# Patient Record
Sex: FEMALE | Race: BLACK OR AFRICAN AMERICAN | NOT HISPANIC OR LATINO | URBAN - METROPOLITAN AREA
[De-identification: names, ages, dates, MRNs, and addresses within clinical notes are randomized per-mention and may not be internally consistent; named-entity substitution may affect disease eponyms.]

---

## 2017-04-11 ENCOUNTER — INPATIENT (INPATIENT)
Facility: HOSPITAL | Age: 82
LOS: 5 days | Discharge: NURSING, REHAB OR LONG-TERM CARE | End: 2017-04-17
Attending: INTERNAL MEDICINE | Admitting: INTERNAL MEDICINE
Payer: MEDICARE

## 2017-04-11 VITALS
HEART RATE: 92 BPM | SYSTOLIC BLOOD PRESSURE: 151 MMHG | DIASTOLIC BLOOD PRESSURE: 71 MMHG | OXYGEN SATURATION: 95 % | TEMPERATURE: 100 F | RESPIRATION RATE: 16 BRPM

## 2017-04-11 DIAGNOSIS — I50.9 HEART FAILURE, UNSPECIFIED: ICD-10-CM

## 2017-04-11 LAB
APPEARANCE UR: CLEAR — SIGNIFICANT CHANGE UP
APTT BLD: 33.5 SEC — SIGNIFICANT CHANGE UP (ref 27.5–37.4)
B PERT DNA SPEC QL NAA+PROBE: SIGNIFICANT CHANGE UP
BASE EXCESS BLDV CALC-SCNC: 1.3 MMOL/L — SIGNIFICANT CHANGE UP
BASOPHILS # BLD AUTO: 0.04 K/UL — SIGNIFICANT CHANGE UP (ref 0–0.2)
BASOPHILS NFR BLD AUTO: 0.4 % — SIGNIFICANT CHANGE UP (ref 0–2)
BILIRUB UR-MCNC: NEGATIVE — SIGNIFICANT CHANGE UP
BLOOD GAS VENOUS - CREATININE: 1.21 MG/DL — SIGNIFICANT CHANGE UP (ref 0.5–1.3)
BLOOD UR QL VISUAL: NEGATIVE — SIGNIFICANT CHANGE UP
C PNEUM DNA SPEC QL NAA+PROBE: NOT DETECTED — SIGNIFICANT CHANGE UP
CHLORIDE BLDV-SCNC: 106 MMOL/L — SIGNIFICANT CHANGE UP (ref 96–108)
CK MB BLD-MCNC: 2.4 NG/ML — SIGNIFICANT CHANGE UP (ref 1–4.7)
CK SERPL-CCNC: 96 U/L — SIGNIFICANT CHANGE UP (ref 25–170)
COLOR SPEC: SIGNIFICANT CHANGE UP
EOSINOPHIL # BLD AUTO: 0.01 K/UL — SIGNIFICANT CHANGE UP (ref 0–0.5)
EOSINOPHIL NFR BLD AUTO: 0.1 % — SIGNIFICANT CHANGE UP (ref 0–6)
FLUAV H1 2009 PAND RNA SPEC QL NAA+PROBE: NOT DETECTED — SIGNIFICANT CHANGE UP
FLUAV H1 RNA SPEC QL NAA+PROBE: NOT DETECTED — SIGNIFICANT CHANGE UP
FLUAV H3 RNA SPEC QL NAA+PROBE: NOT DETECTED — SIGNIFICANT CHANGE UP
FLUAV SUBTYP SPEC NAA+PROBE: SIGNIFICANT CHANGE UP
FLUBV RNA SPEC QL NAA+PROBE: NOT DETECTED — SIGNIFICANT CHANGE UP
GAS PNL BLDV: 139 MMOL/L — SIGNIFICANT CHANGE UP (ref 136–146)
GLUCOSE BLDV-MCNC: 127 — HIGH (ref 70–99)
GLUCOSE UR-MCNC: NEGATIVE — SIGNIFICANT CHANGE UP
HADV DNA SPEC QL NAA+PROBE: NOT DETECTED — SIGNIFICANT CHANGE UP
HCO3 BLDV-SCNC: 24 MMOL/L — SIGNIFICANT CHANGE UP (ref 20–27)
HCOV 229E RNA SPEC QL NAA+PROBE: NOT DETECTED — SIGNIFICANT CHANGE UP
HCOV HKU1 RNA SPEC QL NAA+PROBE: NOT DETECTED — SIGNIFICANT CHANGE UP
HCOV NL63 RNA SPEC QL NAA+PROBE: NOT DETECTED — SIGNIFICANT CHANGE UP
HCOV OC43 RNA SPEC QL NAA+PROBE: NOT DETECTED — SIGNIFICANT CHANGE UP
HCT VFR BLD CALC: 35.4 % — SIGNIFICANT CHANGE UP (ref 34.5–45)
HCT VFR BLDV CALC: 34.2 % — LOW (ref 34.5–45)
HGB BLD-MCNC: 11.1 G/DL — LOW (ref 11.5–15.5)
HGB BLDV-MCNC: 11.1 G/DL — LOW (ref 11.5–15.5)
HMPV RNA SPEC QL NAA+PROBE: NOT DETECTED — SIGNIFICANT CHANGE UP
HPIV1 RNA SPEC QL NAA+PROBE: NOT DETECTED — SIGNIFICANT CHANGE UP
HPIV2 RNA SPEC QL NAA+PROBE: NOT DETECTED — SIGNIFICANT CHANGE UP
HPIV3 RNA SPEC QL NAA+PROBE: NOT DETECTED — SIGNIFICANT CHANGE UP
HPIV4 RNA SPEC QL NAA+PROBE: NOT DETECTED — SIGNIFICANT CHANGE UP
HYALINE CASTS # UR AUTO: SIGNIFICANT CHANGE UP (ref 0–?)
IMM GRANULOCYTES NFR BLD AUTO: 0.2 % — SIGNIFICANT CHANGE UP (ref 0–1.5)
INR BLD: 1.83 — HIGH (ref 0.88–1.17)
KETONES UR-MCNC: NEGATIVE — SIGNIFICANT CHANGE UP
LACTATE BLDV-MCNC: 2.4 MMOL/L — HIGH (ref 0.5–2)
LEUKOCYTE ESTERASE UR-ACNC: HIGH
LYMPHOCYTES # BLD AUTO: 0.87 K/UL — LOW (ref 1–3.3)
LYMPHOCYTES # BLD AUTO: 9.8 % — LOW (ref 13–44)
M PNEUMO DNA SPEC QL NAA+PROBE: NOT DETECTED — SIGNIFICANT CHANGE UP
MCHC RBC-ENTMCNC: 30 PG — SIGNIFICANT CHANGE UP (ref 27–34)
MCHC RBC-ENTMCNC: 31.4 % — LOW (ref 32–36)
MCV RBC AUTO: 95.7 FL — SIGNIFICANT CHANGE UP (ref 80–100)
MONOCYTES # BLD AUTO: 0.79 K/UL — SIGNIFICANT CHANGE UP (ref 0–0.9)
MONOCYTES NFR BLD AUTO: 8.9 % — SIGNIFICANT CHANGE UP (ref 2–14)
MUCOUS THREADS # UR AUTO: SIGNIFICANT CHANGE UP
NEUTROPHILS # BLD AUTO: 7.17 K/UL — SIGNIFICANT CHANGE UP (ref 1.8–7.4)
NEUTROPHILS NFR BLD AUTO: 80.6 % — HIGH (ref 43–77)
NITRITE UR-MCNC: NEGATIVE — SIGNIFICANT CHANGE UP
PCO2 BLDV: 50 MMHG — SIGNIFICANT CHANGE UP (ref 41–51)
PH BLDV: 7.34 PH — SIGNIFICANT CHANGE UP (ref 7.32–7.43)
PH UR: 6 — SIGNIFICANT CHANGE UP (ref 4.6–8)
PLATELET # BLD AUTO: 191 K/UL — SIGNIFICANT CHANGE UP (ref 150–400)
PMV BLD: 11.1 FL — SIGNIFICANT CHANGE UP (ref 7–13)
PO2 BLDV: 39 MMHG — SIGNIFICANT CHANGE UP (ref 35–40)
POTASSIUM BLDV-SCNC: 3.9 MMOL/L — SIGNIFICANT CHANGE UP (ref 3.4–4.5)
PROT UR-MCNC: 20 — SIGNIFICANT CHANGE UP
PROTHROM AB SERPL-ACNC: 20.8 SEC — HIGH (ref 9.8–13.1)
RBC # BLD: 3.7 M/UL — LOW (ref 3.8–5.2)
RBC # FLD: 16.8 % — HIGH (ref 10.3–14.5)
RBC CASTS # UR COMP ASSIST: SIGNIFICANT CHANGE UP (ref 0–?)
RSV RNA SPEC QL NAA+PROBE: NOT DETECTED — SIGNIFICANT CHANGE UP
RV+EV RNA SPEC QL NAA+PROBE: NOT DETECTED — SIGNIFICANT CHANGE UP
SAO2 % BLDV: 64.9 % — SIGNIFICANT CHANGE UP (ref 60–85)
SP GR SPEC: 1.01 — SIGNIFICANT CHANGE UP (ref 1–1.03)
SQUAMOUS # UR AUTO: SIGNIFICANT CHANGE UP
TROPONIN T SERPL-MCNC: < 0.06 NG/ML — SIGNIFICANT CHANGE UP (ref 0–0.06)
UROBILINOGEN FLD QL: NORMAL E.U. — SIGNIFICANT CHANGE UP (ref 0.1–0.2)
WBC # BLD: 8.9 K/UL — SIGNIFICANT CHANGE UP (ref 3.8–10.5)
WBC # FLD AUTO: 8.9 K/UL — SIGNIFICANT CHANGE UP (ref 3.8–10.5)
WBC CLUMPS #/AREA URNS HPF: PRESENT — HIGH (ref 0–?)
WBC UR QL: HIGH (ref 0–?)

## 2017-04-11 PROCEDURE — 71010: CPT | Mod: 26

## 2017-04-11 RX ORDER — WARFARIN SODIUM 2.5 MG/1
2 TABLET ORAL DAILY
Qty: 0 | Refills: 0 | Status: COMPLETED | OUTPATIENT
Start: 2017-04-11 | End: 2017-04-12

## 2017-04-11 RX ORDER — FUROSEMIDE 40 MG
20 TABLET ORAL ONCE
Qty: 0 | Refills: 0 | Status: DISCONTINUED | OUTPATIENT
Start: 2017-04-11 | End: 2017-04-11

## 2017-04-11 RX ORDER — CEFTRIAXONE 500 MG/1
1 INJECTION, POWDER, FOR SOLUTION INTRAMUSCULAR; INTRAVENOUS ONCE
Qty: 0 | Refills: 0 | Status: COMPLETED | OUTPATIENT
Start: 2017-04-11 | End: 2017-04-11

## 2017-04-11 RX ORDER — METOPROLOL TARTRATE 50 MG
50 TABLET ORAL
Qty: 0 | Refills: 0 | Status: DISCONTINUED | OUTPATIENT
Start: 2017-04-11 | End: 2017-04-17

## 2017-04-11 RX ORDER — IPRATROPIUM/ALBUTEROL SULFATE 18-103MCG
3 AEROSOL WITH ADAPTER (GRAM) INHALATION EVERY 6 HOURS
Qty: 0 | Refills: 0 | Status: DISCONTINUED | OUTPATIENT
Start: 2017-04-11 | End: 2017-04-17

## 2017-04-11 RX ORDER — NITROGLYCERIN 6.5 MG
0.4 CAPSULE, EXTENDED RELEASE ORAL
Qty: 0 | Refills: 0 | Status: DISCONTINUED | OUTPATIENT
Start: 2017-04-11 | End: 2017-04-17

## 2017-04-11 RX ORDER — FUROSEMIDE 40 MG
20 TABLET ORAL
Qty: 0 | Refills: 0 | Status: DISCONTINUED | OUTPATIENT
Start: 2017-04-11 | End: 2017-04-11

## 2017-04-11 RX ORDER — FERROUS SULFATE 325(65) MG
325 TABLET ORAL DAILY
Qty: 0 | Refills: 0 | Status: DISCONTINUED | OUTPATIENT
Start: 2017-04-11 | End: 2017-04-17

## 2017-04-11 RX ORDER — IPRATROPIUM/ALBUTEROL SULFATE 18-103MCG
3 AEROSOL WITH ADAPTER (GRAM) INHALATION ONCE
Qty: 0 | Refills: 0 | Status: COMPLETED | OUTPATIENT
Start: 2017-04-11 | End: 2017-04-11

## 2017-04-11 RX ORDER — FUROSEMIDE 40 MG
40 TABLET ORAL ONCE
Qty: 0 | Refills: 0 | Status: COMPLETED | OUTPATIENT
Start: 2017-04-11 | End: 2017-04-11

## 2017-04-11 RX ORDER — DILTIAZEM HCL 120 MG
300 CAPSULE, EXT RELEASE 24 HR ORAL DAILY
Qty: 0 | Refills: 0 | Status: DISCONTINUED | OUTPATIENT
Start: 2017-04-11 | End: 2017-04-17

## 2017-04-11 RX ORDER — MULTIVIT-MIN/FERROUS GLUCONATE 9 MG/15 ML
1 LIQUID (ML) ORAL DAILY
Qty: 0 | Refills: 0 | Status: DISCONTINUED | OUTPATIENT
Start: 2017-04-11 | End: 2017-04-17

## 2017-04-11 RX ORDER — FUROSEMIDE 40 MG
40 TABLET ORAL
Qty: 0 | Refills: 0 | Status: DISCONTINUED | OUTPATIENT
Start: 2017-04-12 | End: 2017-04-13

## 2017-04-11 RX ORDER — FUROSEMIDE 40 MG
20 TABLET ORAL ONCE
Qty: 0 | Refills: 0 | Status: COMPLETED | OUTPATIENT
Start: 2017-04-11 | End: 2017-04-11

## 2017-04-11 RX ADMIN — Medication 60 MILLIGRAM(S): at 13:34

## 2017-04-11 RX ADMIN — Medication 300 MILLIGRAM(S): at 21:56

## 2017-04-11 RX ADMIN — CEFTRIAXONE 100 GRAM(S): 500 INJECTION, POWDER, FOR SOLUTION INTRAMUSCULAR; INTRAVENOUS at 13:34

## 2017-04-11 RX ADMIN — Medication 3 MILLILITER(S): at 13:00

## 2017-04-11 RX ADMIN — Medication 60 MILLIGRAM(S): at 23:21

## 2017-04-11 RX ADMIN — Medication 20 MILLIGRAM(S): at 17:58

## 2017-04-11 RX ADMIN — Medication 0.4 MILLIGRAM(S): at 13:34

## 2017-04-11 RX ADMIN — Medication 40 MILLIGRAM(S): at 13:34

## 2017-04-11 RX ADMIN — Medication 20 MILLIGRAM(S): at 22:45

## 2017-04-11 RX ADMIN — Medication 3 MILLILITER(S): at 23:52

## 2017-04-11 NOTE — H&P ADULT. - RADIOLOGY RESULTS AND INTERPRETATION
CXR: Blunted bilateral costophrenic angles (small pleural reactions), increased right retrocardiac opacity suggest underlying parenchymal consolidation or pleural fluid loculation. Clear remaining lungs. No PTX.  Prosthetic aortic and mitral valves, left chest wall dual-lead pacemaker, retained epicardial pacing wire leads, cardiomegaly, and aortic calcifications.

## 2017-04-11 NOTE — ED ADULT NURSE REASSESSMENT NOTE - NS ED NURSE REASSESS COMMENT FT1
report received from XUAN Lemon at shift change, pt in NAD, awaiting bed, will continue to monitor pt.

## 2017-04-11 NOTE — H&P ADULT. - PROBLEM SELECTOR PLAN 1
CHF exacerbation, increased leg edema to thighs (baseline edema up to mid-shins)  dietary undercompliance, current respiratory viral infection  check proBNP, fluid restrict  c/w bronchodilators, supplemental oxygen, BiLevel at night and prn Acute on chronic CHF exacerbation, increased leg edema to thighs (baseline edema up to mid-shins)  dietary undercompliance, current respiratory viral infection  check proBNP, fluid restrict, strict I/O (Hsu), telemetry, daily weights  c/w lasix iv 40 bid, bronchodilators, supplemental oxygen, BiLevel at night and prn  check TTE for EF, diastology, wall motion, valves  interrogate PPM

## 2017-04-11 NOTE — ED ADULT NURSE REASSESSMENT NOTE - NS ED NURSE REASSESS COMMENT FT1
Patient placed on bipap for slight tachypnea. Tolerating well. Call bell within reach and daughter at bedside. Will continue to monitor.

## 2017-04-11 NOTE — ED PROVIDER NOTE - OBJECTIVE STATEMENT
89 yo female with copd and chf presents with shortness of breath, progressively worsening x days.  Pt denies sob, chest pain, fevers chills,  she does co increased LE swelling bilaterally.  She has no recent travel, surgery, PE risk factors.  She was recently admitted for a CHF exacbation at another hospital

## 2017-04-11 NOTE — H&P ADULT. - ASSESSMENT
89 year old woman with CHF, CAD (CABG), arrythmia (PPM), PVD, MVR/AVR (bioprosthetic), HTN, HLD, COPD, h/o CVA, presents with increased LE edema and SOB, admitted for CHF exacerbation requiring BiLevel, found with positive human metapneumovirus.

## 2017-04-11 NOTE — ED ADULT NURSE NOTE - PSH
Aortic Valvar Stenosis  S/P AVR bovine  CABG (Coronary Artery Bypass Graft)    Cataract, age-related  b/l surgeries  Hernia, inguinal, left  s/p repair??  Mitral Valvular Disorder  S/P MVR bovine  Pacemaker    S/P   x 1

## 2017-04-11 NOTE — ED ADULT NURSE NOTE - OBJECTIVE STATEMENT
Patient received in TRA A&Ox3 and ambulatory small distances with a walker/cane at baseline. C/O worsening SOB , cough with yellow phlegm and worsening LE edema. Denies chest pain, palpitations. Symptoms unrelieved with nebulizer treatments at home. PE and history as noted below.

## 2017-04-11 NOTE — H&P ADULT. - RS GEN PE MLT RESP DETAILS PC
good air movement/breath sounds equal/rales/airway patent rales/good air movement/no wheezes/breath sounds equal/airway patent

## 2017-04-11 NOTE — H&P ADULT. - PROBLEM SELECTOR PLAN 6
DVT ppx: already on coumadin  Advanced care planning: DNR per patient/daughter years ago.  Had not discussed DNI before, will think about it, for now maintain DNR and not DNI

## 2017-04-11 NOTE — ED ADULT TRIAGE NOTE - CHIEF COMPLAINT QUOTE
c/o sob on exertion , fever and generalized weakness , productive cough with yellow phlegm,  Bilateral lower leg edema.PMH- copd, chf, pna, htn, choelsterol , afib

## 2017-04-11 NOTE — ED ADULT NURSE NOTE - PMH
Atrial Fibrillation  paroxysmal, on pradaxa  CAD (Coronary Artery Disease)    COPD (Chronic Obstructive Pulmonary Disease)  2nd hand smoking  CVA (Cerebral Infarction)  Rt posterior cerebral artery  Dyslipidemia    Heart Block AV Third Degree  S/P PPm  HTN (Hypertension)    PVD (Peripheral Vascular Disease)

## 2017-04-11 NOTE — ED ADULT NURSE REASSESSMENT NOTE - NS ED NURSE REASSESS COMMENT FT1
Report received from [facility] coverage RN. Patient discontinued off of bipap. Tolerating well.

## 2017-04-11 NOTE — ED ADULT NURSE REASSESSMENT NOTE - NS ED NURSE REASSESS COMMENT FT1
VS as noted, pt in NAD, 14Fr Hsu placed per order under sterile technique, initial 500cc output draining clear yellow urine, Lasix administered per order, pt awaiting bed assignment, will continue to monitor pt.

## 2017-04-12 DIAGNOSIS — J44.9 CHRONIC OBSTRUCTIVE PULMONARY DISEASE, UNSPECIFIED: ICD-10-CM

## 2017-04-12 DIAGNOSIS — I50.9 HEART FAILURE, UNSPECIFIED: ICD-10-CM

## 2017-04-12 DIAGNOSIS — J12.3 HUMAN METAPNEUMOVIRUS PNEUMONIA: ICD-10-CM

## 2017-04-12 LAB
ALBUMIN SERPL ELPH-MCNC: 3.7 G/DL — SIGNIFICANT CHANGE UP (ref 3.3–5)
ALP SERPL-CCNC: 96 U/L — SIGNIFICANT CHANGE UP (ref 40–120)
ALT FLD-CCNC: 76 U/L — HIGH (ref 4–33)
APTT BLD: 146 SEC — CRITICAL HIGH (ref 27.5–37.4)
AST SERPL-CCNC: 57 U/L — HIGH (ref 4–32)
BACTERIA UR CULT: SIGNIFICANT CHANGE UP
BILIRUB SERPL-MCNC: 0.5 MG/DL — SIGNIFICANT CHANGE UP (ref 0.2–1.2)
BUN SERPL-MCNC: 25 MG/DL — HIGH (ref 7–23)
CALCIUM SERPL-MCNC: 9.4 MG/DL — SIGNIFICANT CHANGE UP (ref 8.4–10.5)
CHLORIDE SERPL-SCNC: 100 MMOL/L — SIGNIFICANT CHANGE UP (ref 98–107)
CK MB BLD-MCNC: 3.11 NG/ML — SIGNIFICANT CHANGE UP (ref 1–4.7)
CK MB BLD-MCNC: SIGNIFICANT CHANGE UP (ref 0–2.5)
CK SERPL-CCNC: 73 U/L — SIGNIFICANT CHANGE UP (ref 25–170)
CO2 SERPL-SCNC: 26 MMOL/L — SIGNIFICANT CHANGE UP (ref 22–31)
CREAT SERPL-MCNC: 1.22 MG/DL — SIGNIFICANT CHANGE UP (ref 0.5–1.3)
GLUCOSE SERPL-MCNC: 147 MG/DL — HIGH (ref 70–99)
HCT VFR BLD CALC: 31.5 % — LOW (ref 34.5–45)
HCT VFR BLD CALC: 34.3 % — LOW (ref 34.5–45)
HGB BLD-MCNC: 10 G/DL — LOW (ref 11.5–15.5)
HGB BLD-MCNC: 10.8 G/DL — LOW (ref 11.5–15.5)
INR BLD: 1.7 — HIGH (ref 0.88–1.17)
LACTATE SERPL-SCNC: 2.1 MMOL/L — HIGH (ref 0.5–2)
MCHC RBC-ENTMCNC: 29.6 PG — SIGNIFICANT CHANGE UP (ref 27–34)
MCHC RBC-ENTMCNC: 29.9 PG — SIGNIFICANT CHANGE UP (ref 27–34)
MCHC RBC-ENTMCNC: 31.5 % — LOW (ref 32–36)
MCHC RBC-ENTMCNC: 31.7 % — LOW (ref 32–36)
MCV RBC AUTO: 93.2 FL — SIGNIFICANT CHANGE UP (ref 80–100)
MCV RBC AUTO: 95 FL — SIGNIFICANT CHANGE UP (ref 80–100)
NT-PROBNP SERPL-SCNC: SIGNIFICANT CHANGE UP PG/ML
PLATELET # BLD AUTO: 177 K/UL — SIGNIFICANT CHANGE UP (ref 150–400)
PLATELET # BLD AUTO: 196 K/UL — SIGNIFICANT CHANGE UP (ref 150–400)
PMV BLD: 10.8 FL — SIGNIFICANT CHANGE UP (ref 7–13)
PMV BLD: 11.7 FL — SIGNIFICANT CHANGE UP (ref 7–13)
POTASSIUM SERPL-MCNC: 4.2 MMOL/L — SIGNIFICANT CHANGE UP (ref 3.5–5.3)
POTASSIUM SERPL-SCNC: 4.2 MMOL/L — SIGNIFICANT CHANGE UP (ref 3.5–5.3)
PROT SERPL-MCNC: 6.9 G/DL — SIGNIFICANT CHANGE UP (ref 6–8.3)
PROTHROM AB SERPL-ACNC: 19.2 SEC — HIGH (ref 9.8–13.1)
RBC # BLD: 3.38 M/UL — LOW (ref 3.8–5.2)
RBC # BLD: 3.61 M/UL — LOW (ref 3.8–5.2)
RBC # FLD: 16.3 % — HIGH (ref 10.3–14.5)
RBC # FLD: 16.5 % — HIGH (ref 10.3–14.5)
SODIUM SERPL-SCNC: 145 MMOL/L — SIGNIFICANT CHANGE UP (ref 135–145)
SPECIMEN SOURCE: SIGNIFICANT CHANGE UP
SPECIMEN SOURCE: SIGNIFICANT CHANGE UP
TROPONIN T SERPL-MCNC: < 0.06 NG/ML — SIGNIFICANT CHANGE UP (ref 0–0.06)
WBC # BLD: 4.94 K/UL — SIGNIFICANT CHANGE UP (ref 3.8–10.5)
WBC # BLD: 6.49 K/UL — SIGNIFICANT CHANGE UP (ref 3.8–10.5)
WBC # FLD AUTO: 4.94 K/UL — SIGNIFICANT CHANGE UP (ref 3.8–10.5)
WBC # FLD AUTO: 6.49 K/UL — SIGNIFICANT CHANGE UP (ref 3.8–10.5)

## 2017-04-12 RX ORDER — IPRATROPIUM/ALBUTEROL SULFATE 18-103MCG
3 AEROSOL WITH ADAPTER (GRAM) INHALATION EVERY 6 HOURS
Qty: 0 | Refills: 0 | Status: DISCONTINUED | OUTPATIENT
Start: 2017-04-12 | End: 2017-04-12

## 2017-04-12 RX ORDER — HEPARIN SODIUM 5000 [USP'U]/ML
INJECTION INTRAVENOUS; SUBCUTANEOUS
Qty: 25000 | Refills: 0 | Status: DISCONTINUED | OUTPATIENT
Start: 2017-04-12 | End: 2017-04-13

## 2017-04-12 RX ORDER — HEPARIN SODIUM 5000 [USP'U]/ML
5500 INJECTION INTRAVENOUS; SUBCUTANEOUS EVERY 6 HOURS
Qty: 0 | Refills: 0 | Status: DISCONTINUED | OUTPATIENT
Start: 2017-04-12 | End: 2017-04-13

## 2017-04-12 RX ORDER — BUDESONIDE AND FORMOTEROL FUMARATE DIHYDRATE 160; 4.5 UG/1; UG/1
2 AEROSOL RESPIRATORY (INHALATION)
Qty: 0 | Refills: 0 | Status: DISCONTINUED | OUTPATIENT
Start: 2017-04-12 | End: 2017-04-17

## 2017-04-12 RX ORDER — HEPARIN SODIUM 5000 [USP'U]/ML
2500 INJECTION INTRAVENOUS; SUBCUTANEOUS EVERY 6 HOURS
Qty: 0 | Refills: 0 | Status: DISCONTINUED | OUTPATIENT
Start: 2017-04-12 | End: 2017-04-13

## 2017-04-12 RX ADMIN — Medication 3 MILLILITER(S): at 15:50

## 2017-04-12 RX ADMIN — HEPARIN SODIUM 1200 UNIT(S)/HR: 5000 INJECTION INTRAVENOUS; SUBCUTANEOUS at 14:19

## 2017-04-12 RX ADMIN — HEPARIN SODIUM 0 UNIT(S)/HR: 5000 INJECTION INTRAVENOUS; SUBCUTANEOUS at 21:15

## 2017-04-12 RX ADMIN — Medication 3 MILLILITER(S): at 03:01

## 2017-04-12 RX ADMIN — Medication 50 MILLIGRAM(S): at 05:18

## 2017-04-12 RX ADMIN — Medication 60 MILLIGRAM(S): at 14:18

## 2017-04-12 RX ADMIN — Medication 3 MILLILITER(S): at 21:59

## 2017-04-12 RX ADMIN — Medication 50 MILLIGRAM(S): at 00:05

## 2017-04-12 RX ADMIN — Medication 1 TABLET(S): at 11:30

## 2017-04-12 RX ADMIN — Medication 50 MILLIGRAM(S): at 17:39

## 2017-04-12 RX ADMIN — WARFARIN SODIUM 2 MILLIGRAM(S): 2.5 TABLET ORAL at 17:39

## 2017-04-12 RX ADMIN — BUDESONIDE AND FORMOTEROL FUMARATE DIHYDRATE 2 PUFF(S): 160; 4.5 AEROSOL RESPIRATORY (INHALATION) at 22:28

## 2017-04-12 RX ADMIN — Medication 40 MILLIGRAM(S): at 17:40

## 2017-04-12 RX ADMIN — Medication 40 MILLIGRAM(S): at 05:17

## 2017-04-12 RX ADMIN — Medication 20 MILLIGRAM(S): at 17:52

## 2017-04-12 RX ADMIN — HEPARIN SODIUM 1000 UNIT(S)/HR: 5000 INJECTION INTRAVENOUS; SUBCUTANEOUS at 22:15

## 2017-04-12 RX ADMIN — Medication 300 MILLIGRAM(S): at 06:20

## 2017-04-12 RX ADMIN — Medication 325 MILLIGRAM(S): at 11:30

## 2017-04-12 RX ADMIN — Medication 60 MILLIGRAM(S): at 05:18

## 2017-04-12 RX ADMIN — Medication 3 MILLILITER(S): at 10:18

## 2017-04-12 NOTE — PHYSICAL THERAPY INITIAL EVALUATION ADULT - GENERAL OBSERVATIONS, REHAB EVAL
Patient received semi supine in bed, (+) supplemental O2 , (+) edema on B/L LE's, mild SOB with activities which relieved at rest .

## 2017-04-12 NOTE — PHYSICAL THERAPY INITIAL EVALUATION ADULT - PERTINENT HX OF CURRENT PROBLEM, REHAB EVAL
This is a 89 y/o F presents with increased LE edema and SOB, admitted for CHF exacerbation, found with positive human metapneumovirus.

## 2017-04-12 NOTE — PROVIDER CONTACT NOTE (OTHER) - SITUATION
patient refusing venodynes states they make her hot; despite education of risk and benefits pt still refusing (3) no apparent problem

## 2017-04-12 NOTE — PROVIDER CONTACT NOTE (OTHER) - SITUATION
patient refusing bipap states they make her hot; despite education of risk and benefits pt still refusing

## 2017-04-13 ENCOUNTER — TRANSCRIPTION ENCOUNTER (OUTPATIENT)
Age: 82
End: 2017-04-13

## 2017-04-13 LAB
ALBUMIN SERPL ELPH-MCNC: 3.5 G/DL — SIGNIFICANT CHANGE UP (ref 3.3–5)
ALP SERPL-CCNC: 87 U/L — SIGNIFICANT CHANGE UP (ref 40–120)
ALT FLD-CCNC: 80 U/L — HIGH (ref 4–33)
APTT BLD: 175.1 SEC — CRITICAL HIGH (ref 27.5–37.4)
AST SERPL-CCNC: 66 U/L — HIGH (ref 4–32)
BILIRUB SERPL-MCNC: 0.4 MG/DL — SIGNIFICANT CHANGE UP (ref 0.2–1.2)
BUN SERPL-MCNC: 32 MG/DL — HIGH (ref 7–23)
CALCIUM SERPL-MCNC: 9.2 MG/DL — SIGNIFICANT CHANGE UP (ref 8.4–10.5)
CHLORIDE SERPL-SCNC: 98 MMOL/L — SIGNIFICANT CHANGE UP (ref 98–107)
CO2 SERPL-SCNC: 26 MMOL/L — SIGNIFICANT CHANGE UP (ref 22–31)
CREAT SERPL-MCNC: 1.22 MG/DL — SIGNIFICANT CHANGE UP (ref 0.5–1.3)
GLUCOSE SERPL-MCNC: 178 MG/DL — HIGH (ref 70–99)
HCT VFR BLD CALC: 34 % — LOW (ref 34.5–45)
HGB BLD-MCNC: 10.8 G/DL — LOW (ref 11.5–15.5)
INR BLD: 2.51 — HIGH (ref 0.88–1.17)
LACTATE SERPL-SCNC: 1.4 MMOL/L — SIGNIFICANT CHANGE UP (ref 0.5–2)
MAGNESIUM SERPL-MCNC: 1.9 MG/DL — SIGNIFICANT CHANGE UP (ref 1.6–2.6)
MCHC RBC-ENTMCNC: 30 PG — SIGNIFICANT CHANGE UP (ref 27–34)
MCHC RBC-ENTMCNC: 31.8 % — LOW (ref 32–36)
MCV RBC AUTO: 94.4 FL — SIGNIFICANT CHANGE UP (ref 80–100)
PHOSPHATE SERPL-MCNC: 4.3 MG/DL — SIGNIFICANT CHANGE UP (ref 2.5–4.5)
PLATELET # BLD AUTO: 190 K/UL — SIGNIFICANT CHANGE UP (ref 150–400)
PMV BLD: 11.6 FL — SIGNIFICANT CHANGE UP (ref 7–13)
POTASSIUM SERPL-MCNC: 3.6 MMOL/L — SIGNIFICANT CHANGE UP (ref 3.5–5.3)
POTASSIUM SERPL-SCNC: 3.6 MMOL/L — SIGNIFICANT CHANGE UP (ref 3.5–5.3)
PROT SERPL-MCNC: 6.7 G/DL — SIGNIFICANT CHANGE UP (ref 6–8.3)
PROTHROM AB SERPL-ACNC: 28.6 SEC — HIGH (ref 9.8–13.1)
RBC # BLD: 3.6 M/UL — LOW (ref 3.8–5.2)
RBC # FLD: 16.4 % — HIGH (ref 10.3–14.5)
SODIUM SERPL-SCNC: 141 MMOL/L — SIGNIFICANT CHANGE UP (ref 135–145)
WBC # BLD: 6.2 K/UL — SIGNIFICANT CHANGE UP (ref 3.8–10.5)
WBC # FLD AUTO: 6.2 K/UL — SIGNIFICANT CHANGE UP (ref 3.8–10.5)

## 2017-04-13 RX ORDER — WARFARIN SODIUM 2.5 MG/1
2 TABLET ORAL DAILY
Qty: 0 | Refills: 0 | Status: COMPLETED | OUTPATIENT
Start: 2017-04-13 | End: 2017-04-13

## 2017-04-13 RX ORDER — FUROSEMIDE 40 MG
40 TABLET ORAL
Qty: 0 | Refills: 0 | Status: DISCONTINUED | OUTPATIENT
Start: 2017-04-13 | End: 2017-04-15

## 2017-04-13 RX ADMIN — HEPARIN SODIUM 0 UNIT(S)/HR: 5000 INJECTION INTRAVENOUS; SUBCUTANEOUS at 07:14

## 2017-04-13 RX ADMIN — Medication 20 MILLIGRAM(S): at 00:25

## 2017-04-13 RX ADMIN — Medication 3 MILLILITER(S): at 21:16

## 2017-04-13 RX ADMIN — Medication 50 MILLIGRAM(S): at 05:02

## 2017-04-13 RX ADMIN — Medication 40 MILLIGRAM(S): at 17:28

## 2017-04-13 RX ADMIN — Medication 20 MILLIGRAM(S): at 17:27

## 2017-04-13 RX ADMIN — Medication 3 MILLILITER(S): at 15:35

## 2017-04-13 RX ADMIN — Medication 20 MILLIGRAM(S): at 23:06

## 2017-04-13 RX ADMIN — Medication 20 MILLIGRAM(S): at 05:04

## 2017-04-13 RX ADMIN — Medication 50 MILLIGRAM(S): at 17:28

## 2017-04-13 RX ADMIN — Medication 40 MILLIGRAM(S): at 05:02

## 2017-04-13 RX ADMIN — Medication 200 MILLIGRAM(S): at 17:29

## 2017-04-13 RX ADMIN — Medication 1 TABLET(S): at 11:03

## 2017-04-13 RX ADMIN — Medication 3 MILLILITER(S): at 03:25

## 2017-04-13 RX ADMIN — Medication 300 MILLIGRAM(S): at 05:02

## 2017-04-13 RX ADMIN — BUDESONIDE AND FORMOTEROL FUMARATE DIHYDRATE 2 PUFF(S): 160; 4.5 AEROSOL RESPIRATORY (INHALATION) at 11:46

## 2017-04-13 RX ADMIN — WARFARIN SODIUM 2 MILLIGRAM(S): 2.5 TABLET ORAL at 17:27

## 2017-04-13 RX ADMIN — Medication 20 MILLIGRAM(S): at 11:03

## 2017-04-13 RX ADMIN — Medication 3 MILLILITER(S): at 11:08

## 2017-04-13 RX ADMIN — BUDESONIDE AND FORMOTEROL FUMARATE DIHYDRATE 2 PUFF(S): 160; 4.5 AEROSOL RESPIRATORY (INHALATION) at 21:49

## 2017-04-13 RX ADMIN — Medication 325 MILLIGRAM(S): at 11:03

## 2017-04-13 NOTE — PROVIDER CONTACT NOTE (OTHER) - DATE AND TIME:
12-Apr-2017 21:09
11-Apr-2017 23:50
12-Apr-2017 23:47
13-Apr-2017 04:38

## 2017-04-13 NOTE — DISCHARGE NOTE ADULT - MEDICATION SUMMARY - MEDICATIONS TO CHANGE
I will SWITCH the dose or number of times a day I take the medications listed below when I get home from the hospital:    Diltiazem Hydrochloride  mg/24 hours oral capsule, extended release  -- 1 cap(s) by mouth once a day

## 2017-04-13 NOTE — DISCHARGE NOTE ADULT - OTHER SIGNIFICANT FINDINGS
Admit Diagnosis) Heart failure  (PMH) Atrial Fibrillation  (PMH) PVD (Peripheral Vascular Disease)  (PMH) CVA (Cerebral Infarction)  (PMH) COPD (Chronic Obstructive Pulmonary Disease)  (PMH) Heart Block AV Third Degree  (PMH) Dyslipidemia  (PMH) HTN (Hypertension)  (PMH) CAD (Coronary Artery Disease)  (Principal DC/DX) Acute CHF  (Problem/DX) COPD (Chronic Obstructive Pulmonary Disease)  (Problem/DX) Human metapneumovirus (hMPV) pneumonia  (Problem/DX) Acute CHF  (PSH) Hernia, inguinal, left  (PSH) S/P   (PSH) Cataract, age-related  (PSH) Pacemaker  (PSH) Mitral Valvular Disorder  (PSH) Aortic Valvar Stenosis  (PSH) CABG (Coronary Artery Bypass Graft) Admit Diagnosis) Heart failure  (PMH) Atrial Fibrillation  (PMH) PVD (Peripheral Vascular Disease)  (PMH) CVA (Cerebral Infarction)  (PMH) COPD (Chronic Obstructive Pulmonary Disease)  (PMH) Heart Block AV Third Degree  (PMH) Dyslipidemia  (PMH) HTN (Hypertension)  (PMH) CAD (Coronary Artery Disease)  (PSH) Hernia, inguinal, left  (PSH) S/P   (PSH) Cataract, age-related  (PSH) Pacemaker  (PSH) Mitral Valvular Disorder  (PSH) Aortic Valvar Stenosis  (PSH) CABG (Coronary Artery Bypass Graft)

## 2017-04-13 NOTE — DISCHARGE NOTE ADULT - ADDITIONAL INSTRUCTIONS
Please follow up with your primary care provider within 1-2 weeks call for an appointment Please follow-up with your primary care provider within 1 week call for an appointment.  Please have your INR checked on Wednesday for your Coumadin dosing.

## 2017-04-13 NOTE — DISCHARGE NOTE ADULT - MEDICATION SUMMARY - MEDICATIONS TO TAKE
I will START or STAY ON the medications listed below when I get home from the hospital:    diltiaZEM 300 mg/24 hours oral capsule, extended release  -- 1 cap(s) by mouth once a day  -- Indication: For A-fib     Diltiazem Hydrochloride  mg/24 hours oral capsule, extended release  -- 1 cap(s) by mouth once a day  -- Indication: For A-fib    warfarin 2 mg oral tablet  -- 1 tab(s) by mouth once a day  -- Indication: For A-fib     Zetia 10 mg oral tablet  -- 1 tab(s) by mouth once a day  -- Indication: For Hyperlipidemia     metoprolol tartrate 50 mg oral tablet  -- 1 tab(s) by mouth 2 times a day  -- Indication: For HTN    Ventolin HFA 90 mcg/inh inhalation aerosol  -- 2 puff(s) inhaled 4 times a day, As Needed  -- Indication: For Bronchodilator     albuterol 2.5 mg/3 mL (0.083%) inhalation solution  -- 3 milliliter(s) inhaled every 6 hours, As Needed  -- Indication: For Bronchodilator     ipratropium 500 mcg/2.5 mL inhalation solution  -- 2.5 milliliter(s) inhaled 2 times a day, As Needed  -- Indication: For Bronchodilator     Advair Diskus 100 mcg-50 mcg inhalation powder  -- 1 puff(s) inhaled 2 times a day  -- Indication: For Bronchodilator     furosemide 40 mg oral tablet  -- 1 tab(s) by mouth once a day  -- Indication: For CHF    guaiFENesin 100 mg/5 mL oral liquid  -- 10 milliliter(s) by mouth every 6 hours, As needed, Cough  -- Indication: For Expectorant    ferrous sulfate 325 mg oral delayed release tablet  -- 1 tab(s) by mouth once a day  -- Indication: For Iron    Coenzyme Q10 100 mg oral capsule  -- 1 cap(s) by mouth once a day  -- Indication: For Vitamin    Centrum Silver Women's  --  by mouth once a day  -- Indication: For Vitamin    Multiple Vitamins oral tablet  -- 1 tab(s) by mouth once a day  -- Indication: For Vitamin I will START or STAY ON the medications listed below when I get home from the hospital:    predniSONE 20 mg oral tablet  -- 2 tab(s) by mouth once a day stop after 3 days.   -- Indication: For COPD (Chronic Obstructive Pulmonary Disease)    Diltiazem Hydrochloride  mg/24 hours oral capsule, extended release  -- 1 cap(s) by mouth once a day  -- Indication: For A-fib    warfarin 2 mg oral tablet  -- 1 tab(s) by mouth once a day  -- Indication: For A-fib     Zetia 10 mg oral tablet  -- 1 tab(s) by mouth once a day  -- Indication: For Hyperlipidemia     metoprolol tartrate 50 mg oral tablet  -- 1 tab(s) by mouth 2 times a day  -- Indication: For HTN    Ventolin HFA 90 mcg/inh inhalation aerosol  -- 2 puff(s) inhaled 4 times a day, As Needed  -- Indication: For Bronchodilator     albuterol 2.5 mg/3 mL (0.083%) inhalation solution  -- 3 milliliter(s) inhaled every 6 hours, As Needed  -- Indication: For Bronchodilator     ipratropium 500 mcg/2.5 mL inhalation solution  -- 2.5 milliliter(s) inhaled 2 times a day, As Needed  -- Indication: For Bronchodilator     Advair Diskus 100 mcg-50 mcg inhalation powder  -- 1 puff(s) inhaled 2 times a day  -- Indication: For Bronchodilator     furosemide 40 mg oral tablet  -- 1 tab(s) by mouth once a day  -- Indication: For CHF    guaiFENesin 100 mg/5 mL oral liquid  -- 10 milliliter(s) by mouth every 6 hours, As needed, Cough  -- Indication: For Expectorant    ferrous sulfate 325 mg oral delayed release tablet  -- 1 tab(s) by mouth once a day  -- Indication: For Iron    Coenzyme Q10 100 mg oral capsule  -- 1 cap(s) by mouth once a day  -- Indication: For Vitamin    Centrum Silver Women's  --  by mouth once a day  -- Indication: For Vitamin    Multiple Vitamins oral tablet  -- 1 tab(s) by mouth once a day  -- Indication: For Vitamin I will START or STAY ON the medications listed below when I get home from the hospital:    predniSONE 20 mg oral tablet  -- 2 tab(s) by mouth once a day stop after 3 days.   -- Indication: For COPD (Chronic Obstructive Pulmonary Disease)    Diltiazem Hydrochloride  mg/24 hours oral capsule, extended release  -- 1 cap(s) by mouth once a day  -- Indication: For A-fib    warfarin 2 mg oral tablet  -- 1 tab(s) by mouth once a day  -- Indication: For A-fib     Zetia 10 mg oral tablet  -- 1 tab(s) by mouth once a day  -- Indication: For Hyperlipidemia     metoprolol tartrate 50 mg oral tablet  -- 1 tab(s) by mouth 2 times a day  -- Indication: For HTN    Ventolin HFA 90 mcg/inh inhalation aerosol  -- 2 puff(s) inhaled 4 times a day, As Needed  -- Indication: For Bronchodilator     ipratropium 500 mcg/2.5 mL inhalation solution  -- 2.5 milliliter(s) inhaled 2 times a day, As Needed  -- Indication: For Bronchodilator     Advair Diskus 100 mcg-50 mcg inhalation powder  -- 1 puff(s) inhaled 2 times a day  -- Indication: For Bronchodilator     furosemide 40 mg oral tablet  -- 1 tab(s) by mouth once a day  -- Indication: For CHF    guaiFENesin 100 mg/5 mL oral liquid  -- 10 milliliter(s) by mouth every 6 hours, As needed, Cough  -- Indication: For Expectorant    ferrous sulfate 325 mg oral delayed release tablet  -- 1 tab(s) by mouth once a day  -- Indication: For Iron    Coenzyme Q10 100 mg oral capsule  -- 1 cap(s) by mouth once a day  -- Indication: For Vitamin    Centrum Silver Women's  --  by mouth once a day  -- Indication: For Vitamin    Multiple Vitamins oral tablet  -- 1 tab(s) by mouth once a day  -- Indication: For Vitamin

## 2017-04-13 NOTE — DISCHARGE NOTE ADULT - CARE PLAN
Principal Discharge DX:	Acute CHF  Goal:	Symptom resolution, medication compliance, prevent recurrent CHF related hospitalization  Instructions for follow-up, activity and diet:	Follow up with your Cardiologist within 1 week, call for appointment  Secondary Diagnosis:	COPD (Chronic Obstructive Pulmonary Disease)  Goal:	Symptom resolution, medication compliance, prevent disease progression  Instructions for follow-up, activity and diet:	Follow up with your pulmonologist within 1 week, call for appointment  Secondary Diagnosis:	Atrial fibrillation  Goal:	rate control, prevent CVA / TIA, medication compliance, risk factor control  Instructions for follow-up, activity and diet:	Maintain therapeutic INR range between 2.0 - 3.0. Proceed to lab according to instructions for INR blood draw and follow up with your physician in next 2-3 days for results and Coumadin dose adjustment Principal Discharge DX:	Acute CHF  Goal:	Symptom resolution, medication compliance, prevent recurrent CHF related hospitalization.  Instructions for follow-up, activity and diet:	Continue present medications.   Follow-up with your Cardiologist within 1 week, call for appointment  Secondary Diagnosis:	COPD (Chronic Obstructive Pulmonary Disease)  Goal:	Symptom resolution, medication compliance, prevent disease progression.  Instructions for follow-up, activity and diet:	Follow-up with your pulmonologist within 1 week, call for appointment.  Secondary Diagnosis:	Atrial fibrillation  Goal:	rate control, prevent CVA / TIA, medication compliance, risk factor control.  Instructions for follow-up, activity and diet:	Continue Coumadin.   Maintain therapeutic INR range between 2.0 - 3.0. Proceed to lab according to instructions for INR blood draw and follow up with your physician in next 2-3 days for results and Coumadin dose adjustment

## 2017-04-13 NOTE — PROVIDER CONTACT NOTE (OTHER) - SITUATION
patient refusing venodynes states they make her hot; despite education of risk and benefits pt still refusing

## 2017-04-13 NOTE — DISCHARGE NOTE ADULT - PROVIDER TOKENS
FREE:[LAST:[Renner],FIRST:[Jason],PHONE:[phone]],FAX:[(   )    -]] FREE:[LAST:[Renner],FIRST:[Jason],PHONE:[phone]],FAX:[(   )    -]],TOKEN:'[id number]'

## 2017-04-13 NOTE — DISCHARGE NOTE ADULT - PLAN OF CARE
Symptom resolution, medication compliance, prevent recurrent CHF related hospitalization Follow up with your Cardiologist within 1 week, call for appointment Symptom resolution, medication compliance, prevent disease progression Follow up with your pulmonologist within 1 week, call for appointment rate control, prevent CVA / TIA, medication compliance, risk factor control Maintain therapeutic INR range between 2.0 - 3.0. Proceed to lab according to instructions for INR blood draw and follow up with your physician in next 2-3 days for results and Coumadin dose adjustment Symptom resolution, medication compliance, prevent recurrent CHF related hospitalization. Continue present medications.   Follow-up with your Cardiologist within 1 week, call for appointment Symptom resolution, medication compliance, prevent disease progression. Follow-up with your pulmonologist within 1 week, call for appointment. rate control, prevent CVA / TIA, medication compliance, risk factor control. Continue Coumadin.   Maintain therapeutic INR range between 2.0 - 3.0. Proceed to lab according to instructions for INR blood draw and follow up with your physician in next 2-3 days for results and Coumadin dose adjustment

## 2017-04-13 NOTE — DISCHARGE NOTE ADULT - CARE PROVIDER_API CALL
Jason Renner  Phone: [phone]  Fax: (   )    - Jason Renner  Phone: [phone]  Fax: (   )    -    Geronimo Hanley ([facility]), Cardiovascular Disease; Internal Medicine  [address] [place] [address]  Phone: [phone]  Fax: [phone]

## 2017-04-13 NOTE — PROVIDER CONTACT NOTE (OTHER) - REASON
3 beats SVT
Rapid HR
patient refusing bipap states they make her hot; despite education of risk and benefits pt still refusing
patient refusing bipap states they make her hot; despite education of risk and benefits pt still refusing
patient refusing venodynes states they make her hot; despite education of risk and benefits pt still refusing
patient refusing venodynes states they make her hot; despite education of risk and benefits pt still refusing
lab aptt 146

## 2017-04-13 NOTE — DISCHARGE NOTE ADULT - PATIENT PORTAL LINK FT
“You can access the [facility] Patient Portal, offered by [facility], by registering with the following website: [email or web address]”

## 2017-04-13 NOTE — PROVIDER CONTACT NOTE (OTHER) - NAME OF MD/NP/PA/DO NOTIFIED:
BRY THOMAS (- In [facility] on Page), [id number]
BRY THOMAS (- In [facility] on Page), [id number]
Leidy Cobb MD
NATALIE LOZANO MD, [id number] team 8
house staff team 8 NATALIE LOZANO MD, [id number]
house staff team 8 NATALIE LOZANO MD, [id number]
house staff team 8 NATALIE LOZANO MD, [id number]

## 2017-04-13 NOTE — DISCHARGE NOTE ADULT - HOSPITAL COURSE
89 year old woman with CHF and chronic leg edema (b/l to mid-shin), CAD (CABG), AFib (PPM), PVD, MVR/AVR (bioprosthetic), HTN, HLD, COPD, h/o CVA, recent hospitalizations (12/2016 [facility] for PNA and COPD, 2/2017 [facility] ?CHF exac?) and rehab stays, usually walks with cane but now walker, presents from home with shortness of breath.  Felt generally well until Saturday 4/8/17 developed increased bilateral leg edema to upper thighs.  Following day had dyspnea on exertion and shortness of breath, associated with subjective fever, generalized weakness, cough with yellow phlegm.  On lasix 40 daily, received extra lasix 20 by daughter.  Came to ER because still short of breath.  No chest pain, chills, recent travel, surgery, PE risk factors.  Daughter notes patient does not follow low sodium diet or restrict water intake.  Notes daughter has itchy throat and many contacts at [employer] on Sunday possible sick contacts.    Patient found with human metapneumovirus positive.  Respirations improved on duonebs, iv solumedrol, iv lasix, and BiLevel.  Patient admitted to telemetry, showed atrial fib.  UA unremarkable, Urine culture multi-organisms.  Blood culture negative.  Guaifenesin for cough.  Cardiology evaluated, on heparin infusion to warfarin bridge for INR 2-3 given paroxysmal atrial fibrillation.  Pulmonary evaluated, adjusted iv solumedrol for COPD (hMPV) and started symbicort.  Echocardiogram ordered. 89 year old woman with CHF and chronic leg edema (b/l to mid-shin), CAD (CABG), AFib (PPM), PVD, MVR/AVR (bioprosthetic), HTN, HLD, COPD, h/o CVA, recent hospitalizations (12/2016 [facility] for PNA and COPD, 2/2017 [facility] ?CHF exac?) and rehab stays, usually walks with cane but now walker, presents from home with shortness of breath.  Felt generally well until Saturday 4/8/17 developed increased bilateral leg edema to upper thighs.  Following day had dyspnea on exertion and shortness of breath, associated with subjective fever, generalized weakness, cough with yellow phlegm.  On lasix 40 daily, received extra lasix 20 by daughter.  Came to ER because still short of breath.  No chest pain, chills, recent travel, surgery, PE risk factors.  Daughter notes patient does not follow low sodium diet or restrict water intake.  Notes daughter has itchy throat and many contacts at [employer] on Sunday possible sick contacts.    Patient found with human metapneumovirus positive.  Respirations improved on duonebs, iv solumedrol, iv lasix, and BiLevel.  Patient admitted to telemetry, showed atrial fib.  UA unremarkable, Urine culture multi-organisms.  Blood culture negative.  Guaifenesin for cough.  Cardiology evaluated, on heparin infusion to warfarin bridge for INR 2-3 given paroxysmal atrial fibrillation.  Pulmonary evaluated, adjusted iv solumedrol for COPD (hMPV) and started symbicort.  Echocardiogram ordered.  4/12 Hsu removed, urinating. UCx (4/11) multiorganisms. BCx (4/11) neg. Pulmonary decreased solumedrol to iv 20q6 x 2days, started symbicort.  4/13 INR 2.5, d/c'd hep drip, dosed warfarin 2  4/13 Med: Acute on chronic CHF- improved on diuresis, can change to Lasix PO, check TTE, consider start ARB, COPD Exac- symbicort, duoneb, solumedrol, PAF- rate control on CCB, BB. DVT ppx- Hep bridge to Coumadin. INR goal 2-3. DNR/DNI. PT: Rehab.   4/13 Cardio: Acute on chronic likely diastolic CHF. F/U TTE to assess LV function, check PPM interrogation, check TSH given A-fib, cont Cardizem, Lopressor, Lasix PO, diuresing well, cont Coumadin.  4/13/17 > Pulm > COPD exacerbation to cont IV steroids for now    4/13/17 > PPM > Normal pacing and sensing, no events corresponding to this admission, No reprogramming done   4/14 ABG in AM on RA.  DC Bipap today and O2 in AM.  Switch to PO Prednisone in AM is stable.  Blood  cx NEG.  Urine Cx-multiple organisms-contaminated  4/14/17 > Echo LVEF 65% Bioprosthetic mitral valve replacement. Mild mitral  regurgitation.  Mean transmitral valve gradient equals 8 mm Hg, which is elevated even in the setting of a prosthetic valve.  2. Bioprosthetic aortic valve replacement. Peak transaortic valve gradient equals 34 mm Hg, mean transaortic valve gradient equals 16 mm Hg, which is probably normal in the  presence of a prosthetic valve.  3. Mild left atrial enlargement.  4. Normal left ventricular internal dimensions and wall thicknesses.  5. Normal left ventricular systolic function. No segmental wall motion abnormalities.  6. Normal right ventricular size and function.  A device wire is noted in the right heart.  7. Estimated right ventricular systolic pressure equals 64 mm Hg, assuming right atrial pressure equals 10 mm Hg, consistent with severe pulmonary hypertension. 89 year old woman with CHF and chronic leg edema (b/l to mid-shin), CAD (CABG), AFib (PPM), PVD, MVR/AVR (bioprosthetic), HTN, HLD, COPD, h/o CVA, recent hospitalizations (12/2016 [facility] for PNA and COPD, 2/2017 [facility] ?CHF exac?) and rehab stays, usually walks with cane but now walker, presents from home with shortness of breath.  Felt generally well until Saturday 4/8/17 developed increased bilateral leg edema to upper thighs.  Following day had dyspnea on exertion and shortness of breath, associated with subjective fever, generalized weakness, cough with yellow phlegm.  On lasix 40 daily, received extra lasix 20 by daughter.  Came to ER because still short of breath.  No chest pain, chills, recent travel, surgery, PE risk factors.  Daughter notes patient does not follow low sodium diet or restrict water intake.  Notes daughter has itchy throat and many contacts at [employer] on Sunday possible sick contacts.    Patient found with human metapneumovirus positive.  Respirations improved on duonebs, iv solumedrol, iv lasix, and BiLevel.  Patient admitted to telemetry, showed atrial fib.  UA unremarkable, Urine culture multi-organisms.  Blood culture negative.  Guaifenesin for cough.  Cardiology evaluated, on heparin infusion to warfarin bridge for INR 2-3 given paroxysmal atrial fibrillation.  Pulmonary evaluated, adjusted iv solumedrol for COPD (hMPV) and started symbicort.  Echocardiogram ordered.  4/12 Hsu removed, urinating. UCx (4/11) multiorganisms. BCx (4/11) neg. Pulmonary decreased solumedrol to iv 20q6 x 2days, started symbicort.  4/13 INR 2.5, d/c'd hep drip, dosed warfarin 2  4/13 Med: Acute on chronic CHF- improved on diuresis, can change to Lasix PO, check TTE, consider start ARB, COPD Exac- symbicort, duoneb, solumedrol, PAF- rate control on CCB, BB. DVT ppx- Hep bridge to Coumadin. INR goal 2-3. DNR/DNI. PT: Rehab.   4/13 Cardio: Acute on chronic likely diastolic CHF. F/U TTE to assess LV function, check PPM interrogation, check TSH given A-fib, cont Cardizem, Lopressor, Lasix PO, diuresing well, cont Coumadin.  4/13/17 > Pulm > COPD exacerbation to cont IV steroids for now    4/13/17 > PPM > Normal pacing and sensing, no events corresponding to this admission, No reprogramming done   4/14 ABG in AM on RA.  DC Bipap today and O2 in AM.  Switch to PO Prednisone in AM is stable.  Blood  cx NEG.  Urine Cx-multiple organisms-contaminated  4/14/17 > Echo LVEF 65% Bioprosthetic mitral valve replacement. Mild mitral  regurgitation.  Mean transmitral valve gradient equals 8 mm Hg, which is elevated even in the setting of a prosthetic valve.  2. Bioprosthetic aortic valve replacement. Peak transaortic valve gradient equals 34 mm Hg, mean transaortic valve gradient equals 16 mm Hg, which is probably normal in the  presence of a prosthetic valve.  3. Mild left atrial enlargement.  4. Normal left ventricular internal dimensions and wall thicknesses.  5. Normal left ventricular systolic function. No segmental wall motion abnormalities.  6. Normal right ventricular size and function.  A device wire is noted in the right heart.  7. Estimated right ventricular systolic pressure equals 64 mm Hg, assuming right atrial pressure equals 10 mm Hg, consistent with severe pulmonary hypertension. 89 year old woman with CHF and chronic leg edema (b/l to mid-shin), CAD (CABG), AFib (PPM), PVD, MVR/AVR (bioprosthetic), HTN, HLD, COPD, h/o CVA, recent hospitalizations (12/2016 [facility] for PNA and COPD, 2/2017 [facility] ?CHF exac?) and rehab stays, usually walks with cane but now walker, presents from home with shortness of breath.  Felt generally well until Saturday 4/8/17 developed increased bilateral leg edema to upper thighs.  Following day had dyspnea on exertion and shortness of breath, associated with subjective fever, generalized weakness, cough with yellow phlegm.  On lasix 40 daily, received extra lasix 20 by daughter.  Came to ER because still short of breath.  No chest pain, chills, recent travel, surgery, PE risk factors.  Daughter notes patient does not follow low sodium diet or restrict water intake.  Notes daughter has itchy throat and many contacts at [employer] on Sunday possible sick contacts.    Patient found with human metapneumovirus positive.  Respirations improved on duonebs, iv solumedrol, iv lasix, and BiLevel.  Patient admitted to telemetry, showed atrial fib.  UA unremarkable, Urine culture multi-organisms.  Blood culture negative.  Guaifenesin for cough.  Cardiology evaluated, on heparin infusion to warfarin bridge for INR 2-3 given paroxysmal atrial fibrillation.  Pulmonary evaluated, adjusted iv solumedrol for COPD (hMPV) and started symbicort.  Echocardiogram ordered.  4/12 Hsu removed, urinating. UCx (4/11) multiorganisms. BCx (4/11) neg. Pulmonary decreased solumedrol to iv 20q6 x 2days, started symbicort.  4/13 INR 2.5, d/c'd hep drip, dosed warfarin 2  4/13 Med: Acute on chronic CHF- improved on diuresis, can change to Lasix PO, check TTE, consider start ARB, COPD Exac- symbicort, duoneb, solumedrol, PAF- rate control on CCB, BB. DVT ppx- Hep bridge to Coumadin. INR goal 2-3. DNR/DNI. PT: Rehab.   4/13 Cardio: Acute on chronic likely diastolic CHF. F/U TTE to assess LV function, check PPM interrogation, check TSH given A-fib, cont Cardizem, Lopressor, Lasix PO, diuresing well, cont Coumadin.  4/13/17 > Pulm > COPD exacerbation to cont IV steroids for now    4/13/17 > PPM > Normal pacing and sensing, no events corresponding to this admission, No reprogramming done   4/14 ABG in AM on RA.  DC Bipap today and O2 in AM.  Switch to PO Prednisone in AM is stable.  Blood  cx NEG.  Urine Cx-multiple organisms-contaminated  4/14/17 > Echo LVEF 65% Bioprosthetic mitral valve replacement. Mild mitral  regurgitation.  Mean transmitral valve gradient equals 8 mm Hg, which is elevated even in the setting of a prosthetic valve.  2. Bioprosthetic aortic valve replacement. Peak transaortic valve gradient equals 34 mm Hg, mean transaortic valve gradient equals 16 mm Hg, which is probably normal in the  presence of a prosthetic valve.  3. Mild left atrial enlargement.  4. Normal left ventricular internal dimensions and wall thicknesses.  5. Normal left ventricular systolic function. No segmental wall motion abnormalities.  6. Normal right ventricular size and function.  A device wire is noted in the right heart.  7. Estimated right ventricular systolic pressure equals 64 mm Hg, assuming right atrial pressure equals 10 mm Hg, consistent with severe pulmonary hypertension.  4/15 ABG: metabolic alkalosis, hypoxemia-will need O2 and will need to go down on lasix dose.  Will switch to oral prednisone.  4/17 Pt is medically stable for discharge to Rehab today as per Dr. Nixon.

## 2017-04-14 LAB
BASOPHILS # BLD AUTO: 0.03 K/UL — SIGNIFICANT CHANGE UP (ref 0–0.2)
BASOPHILS NFR BLD AUTO: 0.5 % — SIGNIFICANT CHANGE UP (ref 0–2)
BUN SERPL-MCNC: 32 MG/DL — HIGH (ref 7–23)
CALCIUM SERPL-MCNC: 9.5 MG/DL — SIGNIFICANT CHANGE UP (ref 8.4–10.5)
CHLORIDE SERPL-SCNC: 98 MMOL/L — SIGNIFICANT CHANGE UP (ref 98–107)
CO2 SERPL-SCNC: 33 MMOL/L — HIGH (ref 22–31)
CREAT SERPL-MCNC: 1.13 MG/DL — SIGNIFICANT CHANGE UP (ref 0.5–1.3)
EOSINOPHIL # BLD AUTO: 0.01 K/UL — SIGNIFICANT CHANGE UP (ref 0–0.5)
EOSINOPHIL NFR BLD AUTO: 0.2 % — SIGNIFICANT CHANGE UP (ref 0–6)
GLUCOSE SERPL-MCNC: 157 MG/DL — HIGH (ref 70–99)
HCT VFR BLD CALC: 35.2 % — SIGNIFICANT CHANGE UP (ref 34.5–45)
HCT VFR BLD CALC: 35.2 % — SIGNIFICANT CHANGE UP (ref 34.5–45)
HGB BLD-MCNC: 11.3 G/DL — LOW (ref 11.5–15.5)
HGB BLD-MCNC: 11.3 G/DL — LOW (ref 11.5–15.5)
IMM GRANULOCYTES NFR BLD AUTO: 0.5 % — SIGNIFICANT CHANGE UP (ref 0–1.5)
INR BLD: 2.22 — HIGH (ref 0.88–1.17)
LYMPHOCYTES # BLD AUTO: 0.58 K/UL — LOW (ref 1–3.3)
LYMPHOCYTES # BLD AUTO: 10 % — LOW (ref 13–44)
MAGNESIUM SERPL-MCNC: 2.1 MG/DL — SIGNIFICANT CHANGE UP (ref 1.6–2.6)
MCHC RBC-ENTMCNC: 30.1 PG — SIGNIFICANT CHANGE UP (ref 27–34)
MCHC RBC-ENTMCNC: 30.1 PG — SIGNIFICANT CHANGE UP (ref 27–34)
MCHC RBC-ENTMCNC: 32.1 % — SIGNIFICANT CHANGE UP (ref 32–36)
MCHC RBC-ENTMCNC: 32.1 % — SIGNIFICANT CHANGE UP (ref 32–36)
MCV RBC AUTO: 93.9 FL — SIGNIFICANT CHANGE UP (ref 80–100)
MCV RBC AUTO: 93.9 FL — SIGNIFICANT CHANGE UP (ref 80–100)
MONOCYTES # BLD AUTO: 0.35 K/UL — SIGNIFICANT CHANGE UP (ref 0–0.9)
MONOCYTES NFR BLD AUTO: 6 % — SIGNIFICANT CHANGE UP (ref 2–14)
NEUTROPHILS # BLD AUTO: 4.82 K/UL — SIGNIFICANT CHANGE UP (ref 1.8–7.4)
NEUTROPHILS NFR BLD AUTO: 82.8 % — HIGH (ref 43–77)
PLATELET # BLD AUTO: 136 K/UL — LOW (ref 150–400)
PLATELET # BLD AUTO: 136 K/UL — LOW (ref 150–400)
PMV BLD: 11.6 FL — SIGNIFICANT CHANGE UP (ref 7–13)
PMV BLD: 11.6 FL — SIGNIFICANT CHANGE UP (ref 7–13)
POTASSIUM SERPL-MCNC: 4 MMOL/L — SIGNIFICANT CHANGE UP (ref 3.5–5.3)
POTASSIUM SERPL-SCNC: 4 MMOL/L — SIGNIFICANT CHANGE UP (ref 3.5–5.3)
PROTHROM AB SERPL-ACNC: 25.3 SEC — HIGH (ref 9.8–13.1)
RBC # BLD: 3.75 M/UL — LOW (ref 3.8–5.2)
RBC # BLD: 3.75 M/UL — LOW (ref 3.8–5.2)
RBC # FLD: 16.1 % — HIGH (ref 10.3–14.5)
RBC # FLD: 16.1 % — HIGH (ref 10.3–14.5)
SODIUM SERPL-SCNC: 145 MMOL/L — SIGNIFICANT CHANGE UP (ref 135–145)
WBC # BLD: 5.82 K/UL — SIGNIFICANT CHANGE UP (ref 3.8–10.5)
WBC # BLD: 5.82 K/UL — SIGNIFICANT CHANGE UP (ref 3.8–10.5)
WBC # FLD AUTO: 5.82 K/UL — SIGNIFICANT CHANGE UP (ref 3.8–10.5)
WBC # FLD AUTO: 5.82 K/UL — SIGNIFICANT CHANGE UP (ref 3.8–10.5)

## 2017-04-14 PROCEDURE — 93306 TTE W/DOPPLER COMPLETE: CPT | Mod: 26

## 2017-04-14 RX ORDER — WARFARIN SODIUM 2.5 MG/1
2 TABLET ORAL ONCE
Qty: 0 | Refills: 0 | Status: COMPLETED | OUTPATIENT
Start: 2017-04-14 | End: 2017-04-14

## 2017-04-14 RX ADMIN — BUDESONIDE AND FORMOTEROL FUMARATE DIHYDRATE 2 PUFF(S): 160; 4.5 AEROSOL RESPIRATORY (INHALATION) at 09:26

## 2017-04-14 RX ADMIN — Medication 1 TABLET(S): at 12:45

## 2017-04-14 RX ADMIN — Medication 300 MILLIGRAM(S): at 06:32

## 2017-04-14 RX ADMIN — Medication 3 MILLILITER(S): at 09:43

## 2017-04-14 RX ADMIN — Medication 40 MILLIGRAM(S): at 19:41

## 2017-04-14 RX ADMIN — Medication 3 MILLILITER(S): at 16:05

## 2017-04-14 RX ADMIN — Medication 20 MILLIGRAM(S): at 06:32

## 2017-04-14 RX ADMIN — Medication 3 MILLILITER(S): at 22:41

## 2017-04-14 RX ADMIN — Medication 20 MILLIGRAM(S): at 12:44

## 2017-04-14 RX ADMIN — Medication 3 MILLILITER(S): at 03:22

## 2017-04-14 RX ADMIN — WARFARIN SODIUM 2 MILLIGRAM(S): 2.5 TABLET ORAL at 19:39

## 2017-04-14 RX ADMIN — BUDESONIDE AND FORMOTEROL FUMARATE DIHYDRATE 2 PUFF(S): 160; 4.5 AEROSOL RESPIRATORY (INHALATION) at 23:21

## 2017-04-14 RX ADMIN — Medication 50 MILLIGRAM(S): at 06:33

## 2017-04-14 RX ADMIN — Medication 50 MILLIGRAM(S): at 19:41

## 2017-04-14 RX ADMIN — Medication 40 MILLIGRAM(S): at 06:32

## 2017-04-14 RX ADMIN — Medication 325 MILLIGRAM(S): at 12:44

## 2017-04-14 NOTE — DIETITIAN INITIAL EVALUATION ADULT. - OTHER INFO
Nutrition consult received for Registered Dietitian; admitted for shortness of breath. Met with patient, reports appetite/PO is good for meals >75%. Denies food allergies, GI distress (nausea/vomiting/constipation/diarrhea), or issues with chewing/swallowing. Reports UBW PTA is typically stable at 160# without any recent significant weight changes. Patient acknowledges current diet restrictions without any nutrition-related questions or concerns voiced at this time.

## 2017-04-14 NOTE — DIETITIAN INITIAL EVALUATION ADULT. - NS AS NUTRI INTERV MEALS SNACK
General/healthful diet/Carbohydrate - modified diet/1) Continue Consistent Carbohydrate (no snack), DASH/TLC (cholesterol and sodium restricted), Low Fat, 1500ml Fluid Restriction diet which remains appropriate    2) Suggest obtain HbA1C level     3) Suggest outpatient follow up with appropriate RD for purposes of long-term nutrition evaluation.

## 2017-04-14 NOTE — DIETITIAN INITIAL EVALUATION ADULT. - DIET TYPE
Low Fat, 1500ml Fluid Restriction/DASH/TLC (sodium and cholesterol restricted diet)/consistent carbohydrate (no snacks)

## 2017-04-14 NOTE — DIETITIAN INITIAL EVALUATION ADULT. - PROBLEM SELECTOR PLAN 1
Acute on chronic CHF exacerbation, increased leg edema to thighs (baseline edema up to mid-shins)  dietary undercompliance, current respiratory viral infection  check proBNP, fluid restrict, strict I/O (Hsu), telemetry, daily weights  c/w lasix iv 40 bid, bronchodilators, supplemental oxygen, BiLevel at night and prn  check TTE for EF, diastology, wall motion, valves  interrogate PPM

## 2017-04-15 LAB
BASE EXCESS BLDA CALC-SCNC: 14.5 MMOL/L — SIGNIFICANT CHANGE UP
BASE EXCESS BLDA CALC-SCNC: 15.2 MMOL/L — SIGNIFICANT CHANGE UP
BUN SERPL-MCNC: 38 MG/DL — HIGH (ref 7–23)
CALCIUM SERPL-MCNC: 9 MG/DL — SIGNIFICANT CHANGE UP (ref 8.4–10.5)
CHLORIDE SERPL-SCNC: 95 MMOL/L — LOW (ref 98–107)
CO2 SERPL-SCNC: 33 MMOL/L — HIGH (ref 22–31)
CREAT SERPL-MCNC: 1.11 MG/DL — SIGNIFICANT CHANGE UP (ref 0.5–1.3)
GLUCOSE SERPL-MCNC: 175 MG/DL — HIGH (ref 70–99)
HCO3 BLDA-SCNC: 37 MMOL/L — HIGH (ref 22–26)
HCO3 BLDA-SCNC: 38 MMOL/L — HIGH (ref 22–26)
HCT VFR BLD CALC: 34.3 % — LOW (ref 34.5–45)
HGB BLD-MCNC: 10.6 G/DL — LOW (ref 11.5–15.5)
INR BLD: 2.49 — HIGH (ref 0.88–1.17)
MCHC RBC-ENTMCNC: 29.4 PG — SIGNIFICANT CHANGE UP (ref 27–34)
MCHC RBC-ENTMCNC: 30.9 % — LOW (ref 32–36)
MCV RBC AUTO: 95 FL — SIGNIFICANT CHANGE UP (ref 80–100)
PCO2 BLDA: 50 MMHG — HIGH (ref 32–48)
PCO2 BLDA: 51 MMHG — HIGH (ref 32–48)
PH BLDA: 7.5 PH — HIGH (ref 7.35–7.45)
PH BLDA: 7.51 PH — HIGH (ref 7.35–7.45)
PLATELET # BLD AUTO: 184 K/UL — SIGNIFICANT CHANGE UP (ref 150–400)
PMV BLD: 11.5 FL — SIGNIFICANT CHANGE UP (ref 7–13)
PO2 BLDA: 37 MMHG — CRITICAL LOW (ref 83–108)
PO2 BLDA: 52 MMHG — LOW (ref 83–108)
POTASSIUM SERPL-MCNC: 3.5 MMOL/L — SIGNIFICANT CHANGE UP (ref 3.5–5.3)
POTASSIUM SERPL-SCNC: 3.5 MMOL/L — SIGNIFICANT CHANGE UP (ref 3.5–5.3)
PROTHROM AB SERPL-ACNC: 28.4 SEC — HIGH (ref 9.8–13.1)
RBC # BLD: 3.61 M/UL — LOW (ref 3.8–5.2)
RBC # FLD: 16 % — HIGH (ref 10.3–14.5)
SAO2 % BLDA: 65.8 % — LOW (ref 95–99)
SAO2 % BLDA: 85.7 % — LOW (ref 95–99)
SODIUM SERPL-SCNC: 142 MMOL/L — SIGNIFICANT CHANGE UP (ref 135–145)
WBC # BLD: 7.59 K/UL — SIGNIFICANT CHANGE UP (ref 3.8–10.5)
WBC # FLD AUTO: 7.59 K/UL — SIGNIFICANT CHANGE UP (ref 3.8–10.5)

## 2017-04-15 RX ORDER — WARFARIN SODIUM 2.5 MG/1
2 TABLET ORAL ONCE
Qty: 0 | Refills: 0 | Status: COMPLETED | OUTPATIENT
Start: 2017-04-15 | End: 2017-04-15

## 2017-04-15 RX ORDER — FUROSEMIDE 40 MG
40 TABLET ORAL DAILY
Qty: 0 | Refills: 0 | Status: DISCONTINUED | OUTPATIENT
Start: 2017-04-15 | End: 2017-04-17

## 2017-04-15 RX ADMIN — Medication 325 MILLIGRAM(S): at 12:05

## 2017-04-15 RX ADMIN — Medication 50 MILLIGRAM(S): at 06:32

## 2017-04-15 RX ADMIN — Medication 1 TABLET(S): at 12:09

## 2017-04-15 RX ADMIN — Medication 300 MILLIGRAM(S): at 06:32

## 2017-04-15 RX ADMIN — Medication 40 MILLIGRAM(S): at 14:11

## 2017-04-15 RX ADMIN — Medication 3 MILLILITER(S): at 10:00

## 2017-04-15 RX ADMIN — Medication 3 MILLILITER(S): at 03:58

## 2017-04-15 RX ADMIN — Medication 50 MILLIGRAM(S): at 18:36

## 2017-04-15 RX ADMIN — Medication 200 MILLIGRAM(S): at 18:40

## 2017-04-15 RX ADMIN — BUDESONIDE AND FORMOTEROL FUMARATE DIHYDRATE 2 PUFF(S): 160; 4.5 AEROSOL RESPIRATORY (INHALATION) at 21:13

## 2017-04-15 RX ADMIN — Medication 3 MILLILITER(S): at 21:30

## 2017-04-15 RX ADMIN — WARFARIN SODIUM 2 MILLIGRAM(S): 2.5 TABLET ORAL at 18:35

## 2017-04-15 RX ADMIN — BUDESONIDE AND FORMOTEROL FUMARATE DIHYDRATE 2 PUFF(S): 160; 4.5 AEROSOL RESPIRATORY (INHALATION) at 09:20

## 2017-04-15 RX ADMIN — Medication 40 MILLIGRAM(S): at 06:32

## 2017-04-15 NOTE — PROVIDER CONTACT NOTE (CRITICAL VALUE NOTIFICATION) - ASSESSMENT
patient shows no signs of bleeding or distress
vital signs stable   no acute distress noted   all fall safety precautions maintained
no respitatory ditress noted

## 2017-04-15 NOTE — PROVIDER CONTACT NOTE (CRITICAL VALUE NOTIFICATION) - BACKGROUND
Admit Diagnosis) Heart failure  (PMH) Atrial Fibrillation  (PMH) PVD (Peripheral Vascular Disease)  (PMH) CVA (Cerebral Infarction)  (PMH) COPD (Chronic Obstructive Pulmonary Disease)  (PMH) Heart Block AV Third Degree
pt was off 02

## 2017-04-16 LAB
BACTERIA BLD CULT: SIGNIFICANT CHANGE UP
BUN SERPL-MCNC: 45 MG/DL — HIGH (ref 7–23)
CALCIUM SERPL-MCNC: 9.4 MG/DL — SIGNIFICANT CHANGE UP (ref 8.4–10.5)
CHLORIDE SERPL-SCNC: 95 MMOL/L — LOW (ref 98–107)
CO2 SERPL-SCNC: 34 MMOL/L — HIGH (ref 22–31)
CREAT SERPL-MCNC: 1.1 MG/DL — SIGNIFICANT CHANGE UP (ref 0.5–1.3)
GLUCOSE SERPL-MCNC: 168 MG/DL — HIGH (ref 70–99)
HCT VFR BLD CALC: 35.6 % — SIGNIFICANT CHANGE UP (ref 34.5–45)
HGB BLD-MCNC: 11.3 G/DL — LOW (ref 11.5–15.5)
INR BLD: 2.39 — HIGH (ref 0.88–1.17)
MCHC RBC-ENTMCNC: 29.5 PG — SIGNIFICANT CHANGE UP (ref 27–34)
MCHC RBC-ENTMCNC: 31.7 % — LOW (ref 32–36)
MCV RBC AUTO: 93 FL — SIGNIFICANT CHANGE UP (ref 80–100)
PLATELET # BLD AUTO: 168 K/UL — SIGNIFICANT CHANGE UP (ref 150–400)
PMV BLD: 10.8 FL — SIGNIFICANT CHANGE UP (ref 7–13)
POTASSIUM SERPL-MCNC: 3.6 MMOL/L — SIGNIFICANT CHANGE UP (ref 3.5–5.3)
POTASSIUM SERPL-SCNC: 3.6 MMOL/L — SIGNIFICANT CHANGE UP (ref 3.5–5.3)
PROTHROM AB SERPL-ACNC: 27.2 SEC — HIGH (ref 9.8–13.1)
RBC # BLD: 3.83 M/UL — SIGNIFICANT CHANGE UP (ref 3.8–5.2)
RBC # FLD: 15.8 % — HIGH (ref 10.3–14.5)
SODIUM SERPL-SCNC: 143 MMOL/L — SIGNIFICANT CHANGE UP (ref 135–145)
WBC # BLD: 10.27 K/UL — SIGNIFICANT CHANGE UP (ref 3.8–10.5)
WBC # FLD AUTO: 10.27 K/UL — SIGNIFICANT CHANGE UP (ref 3.8–10.5)

## 2017-04-16 RX ORDER — WARFARIN SODIUM 2.5 MG/1
2 TABLET ORAL ONCE
Qty: 0 | Refills: 0 | Status: COMPLETED | OUTPATIENT
Start: 2017-04-16 | End: 2017-04-16

## 2017-04-16 RX ORDER — POTASSIUM CHLORIDE 20 MEQ
40 PACKET (EA) ORAL ONCE
Qty: 0 | Refills: 0 | Status: COMPLETED | OUTPATIENT
Start: 2017-04-16 | End: 2017-04-16

## 2017-04-16 RX ADMIN — Medication 40 MILLIGRAM(S): at 05:33

## 2017-04-16 RX ADMIN — WARFARIN SODIUM 2 MILLIGRAM(S): 2.5 TABLET ORAL at 17:30

## 2017-04-16 RX ADMIN — BUDESONIDE AND FORMOTEROL FUMARATE DIHYDRATE 2 PUFF(S): 160; 4.5 AEROSOL RESPIRATORY (INHALATION) at 21:50

## 2017-04-16 RX ADMIN — Medication 300 MILLIGRAM(S): at 05:33

## 2017-04-16 RX ADMIN — Medication 3 MILLILITER(S): at 10:00

## 2017-04-16 RX ADMIN — Medication 50 MILLIGRAM(S): at 05:33

## 2017-04-16 RX ADMIN — Medication 40 MILLIEQUIVALENT(S): at 22:59

## 2017-04-16 RX ADMIN — Medication 3 MILLILITER(S): at 16:02

## 2017-04-16 RX ADMIN — Medication 1 TABLET(S): at 11:14

## 2017-04-16 RX ADMIN — Medication 50 MILLIGRAM(S): at 17:30

## 2017-04-16 RX ADMIN — Medication 200 MILLIGRAM(S): at 21:50

## 2017-04-16 RX ADMIN — Medication 325 MILLIGRAM(S): at 11:14

## 2017-04-16 RX ADMIN — BUDESONIDE AND FORMOTEROL FUMARATE DIHYDRATE 2 PUFF(S): 160; 4.5 AEROSOL RESPIRATORY (INHALATION) at 11:13

## 2017-04-16 RX ADMIN — Medication 40 MILLIGRAM(S): at 05:34

## 2017-04-16 RX ADMIN — Medication 3 MILLILITER(S): at 22:15

## 2017-04-17 VITALS — OXYGEN SATURATION: 98 %

## 2017-04-17 LAB
BUN SERPL-MCNC: 38 MG/DL — HIGH (ref 7–23)
CALCIUM SERPL-MCNC: 9 MG/DL — SIGNIFICANT CHANGE UP (ref 8.4–10.5)
CHLORIDE SERPL-SCNC: 98 MMOL/L — SIGNIFICANT CHANGE UP (ref 98–107)
CO2 SERPL-SCNC: 36 MMOL/L — HIGH (ref 22–31)
CREAT SERPL-MCNC: 0.88 MG/DL — SIGNIFICANT CHANGE UP (ref 0.5–1.3)
GLUCOSE SERPL-MCNC: 127 MG/DL — HIGH (ref 70–99)
HCT VFR BLD CALC: 36.2 % — SIGNIFICANT CHANGE UP (ref 34.5–45)
HGB BLD-MCNC: 11.2 G/DL — LOW (ref 11.5–15.5)
INR BLD: 2.17 — HIGH (ref 0.88–1.17)
MAGNESIUM SERPL-MCNC: 2.1 MG/DL — SIGNIFICANT CHANGE UP (ref 1.6–2.6)
MCHC RBC-ENTMCNC: 29.7 PG — SIGNIFICANT CHANGE UP (ref 27–34)
MCHC RBC-ENTMCNC: 30.9 % — LOW (ref 32–36)
MCV RBC AUTO: 96 FL — SIGNIFICANT CHANGE UP (ref 80–100)
PLATELET # BLD AUTO: 170 K/UL — SIGNIFICANT CHANGE UP (ref 150–400)
PMV BLD: 11.5 FL — SIGNIFICANT CHANGE UP (ref 7–13)
POTASSIUM SERPL-MCNC: 3.8 MMOL/L — SIGNIFICANT CHANGE UP (ref 3.5–5.3)
POTASSIUM SERPL-SCNC: 3.8 MMOL/L — SIGNIFICANT CHANGE UP (ref 3.5–5.3)
PROTHROM AB SERPL-ACNC: 24.7 SEC — HIGH (ref 9.8–13.1)
RBC # BLD: 3.77 M/UL — LOW (ref 3.8–5.2)
RBC # FLD: 16 % — HIGH (ref 10.3–14.5)
SODIUM SERPL-SCNC: 145 MMOL/L — SIGNIFICANT CHANGE UP (ref 135–145)
WBC # BLD: 13.42 K/UL — HIGH (ref 3.8–10.5)
WBC # FLD AUTO: 13.42 K/UL — HIGH (ref 3.8–10.5)

## 2017-04-17 RX ORDER — WARFARIN SODIUM 2.5 MG/1
2 TABLET ORAL ONCE
Qty: 0 | Refills: 0 | Status: COMPLETED | OUTPATIENT
Start: 2017-04-17 | End: 2017-04-17

## 2017-04-17 RX ADMIN — WARFARIN SODIUM 2 MILLIGRAM(S): 2.5 TABLET ORAL at 17:12

## 2017-04-17 RX ADMIN — Medication 3 MILLILITER(S): at 17:17

## 2017-04-17 RX ADMIN — Medication 325 MILLIGRAM(S): at 11:36

## 2017-04-17 RX ADMIN — Medication 50 MILLIGRAM(S): at 05:42

## 2017-04-17 RX ADMIN — Medication 50 MILLIGRAM(S): at 17:11

## 2017-04-17 RX ADMIN — Medication 1 TABLET(S): at 12:17

## 2017-04-17 RX ADMIN — Medication 300 MILLIGRAM(S): at 05:42

## 2017-04-17 RX ADMIN — BUDESONIDE AND FORMOTEROL FUMARATE DIHYDRATE 2 PUFF(S): 160; 4.5 AEROSOL RESPIRATORY (INHALATION) at 09:46

## 2017-04-17 RX ADMIN — Medication 40 MILLIGRAM(S): at 05:42

## 2017-04-17 RX ADMIN — Medication 3 MILLILITER(S): at 04:16

## 2017-04-17 RX ADMIN — Medication 3 MILLILITER(S): at 09:10

## 2017-05-07 ENCOUNTER — INPATIENT (INPATIENT)
Facility: HOSPITAL | Age: 82
LOS: 4 days | Discharge: HOME | End: 2017-05-12
Attending: INTERNAL MEDICINE | Admitting: INTERNAL MEDICINE
Payer: MEDICARE

## 2017-05-07 VITALS
HEART RATE: 65 BPM | SYSTOLIC BLOOD PRESSURE: 113 MMHG | TEMPERATURE: 98 F | RESPIRATION RATE: 18 BRPM | OXYGEN SATURATION: 94 % | DIASTOLIC BLOOD PRESSURE: 52 MMHG

## 2017-05-07 DIAGNOSIS — Z29.9 ENCOUNTER FOR PROPHYLACTIC MEASURES, UNSPECIFIED: ICD-10-CM

## 2017-05-07 DIAGNOSIS — E78.5 HYPERLIPIDEMIA, UNSPECIFIED: ICD-10-CM

## 2017-05-07 DIAGNOSIS — N17.9 ACUTE KIDNEY FAILURE, UNSPECIFIED: ICD-10-CM

## 2017-05-07 DIAGNOSIS — I10 ESSENTIAL (PRIMARY) HYPERTENSION: ICD-10-CM

## 2017-05-07 DIAGNOSIS — I48.91 UNSPECIFIED ATRIAL FIBRILLATION: ICD-10-CM
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
## 2017-05-07 DIAGNOSIS — J44.9 CHRONIC OBSTRUCTIVE PULMONARY DISEASE, UNSPECIFIED: ICD-10-CM

## 2017-05-07 DIAGNOSIS — I50.9 HEART FAILURE, UNSPECIFIED: ICD-10-CM

## 2017-05-07 DIAGNOSIS — R53.1 WEAKNESS: ICD-10-CM

## 2017-05-07 LAB
ALBUMIN SERPL ELPH-MCNC: 3.1 G/DL — LOW (ref 3.3–5)
ALP SERPL-CCNC: 167 U/L — HIGH (ref 40–120)
ALT FLD-CCNC: 88 U/L — HIGH (ref 4–33)
APPEARANCE UR: CLEAR — SIGNIFICANT CHANGE UP
AST SERPL-CCNC: 103 U/L — HIGH (ref 4–32)
BASOPHILS # BLD AUTO: 0.06 K/UL — SIGNIFICANT CHANGE UP (ref 0–0.2)
BASOPHILS NFR BLD AUTO: 0.6 % — SIGNIFICANT CHANGE UP (ref 0–2)
BASOPHILS NFR SPEC: 2 % — SIGNIFICANT CHANGE UP (ref 0–2)
BILIRUB SERPL-MCNC: 0.5 MG/DL — SIGNIFICANT CHANGE UP (ref 0.2–1.2)
BILIRUB UR-MCNC: NEGATIVE — SIGNIFICANT CHANGE UP
BLOOD UR QL VISUAL: NEGATIVE — SIGNIFICANT CHANGE UP
BUN SERPL-MCNC: 38 MG/DL — HIGH (ref 7–23)
CALCIUM SERPL-MCNC: 10.1 MG/DL — SIGNIFICANT CHANGE UP (ref 8.4–10.5)
CHLORIDE SERPL-SCNC: 100 MMOL/L — SIGNIFICANT CHANGE UP (ref 98–107)
CK MB BLD-MCNC: 1.94 NG/ML — SIGNIFICANT CHANGE UP (ref 1–4.7)
CK MB BLD-MCNC: SIGNIFICANT CHANGE UP (ref 0–2.5)
CK SERPL-CCNC: 40 U/L — SIGNIFICANT CHANGE UP (ref 25–170)
CO2 SERPL-SCNC: 20 MMOL/L — LOW (ref 22–31)
COLOR SPEC: YELLOW — SIGNIFICANT CHANGE UP
CREAT SERPL-MCNC: 1.79 MG/DL — HIGH (ref 0.5–1.3)
EOSINOPHIL # BLD AUTO: 0.35 K/UL — SIGNIFICANT CHANGE UP (ref 0–0.5)
EOSINOPHIL NFR BLD AUTO: 3.7 % — SIGNIFICANT CHANGE UP (ref 0–6)
EOSINOPHIL NFR FLD: 2 % — SIGNIFICANT CHANGE UP (ref 0–6)
GLUCOSE SERPL-MCNC: 149 MG/DL — HIGH (ref 70–99)
GLUCOSE UR-MCNC: NEGATIVE — SIGNIFICANT CHANGE UP
HCT VFR BLD CALC: 34.6 % — SIGNIFICANT CHANGE UP (ref 34.5–45)
HGB BLD-MCNC: 11 G/DL — LOW (ref 11.5–15.5)
HYALINE CASTS # UR AUTO: SIGNIFICANT CHANGE UP (ref 0–?)
IMM GRANULOCYTES NFR BLD AUTO: 0.7 % — SIGNIFICANT CHANGE UP (ref 0–1.5)
INR BLD: 3.28 — HIGH (ref 0.88–1.17)
KETONES UR-MCNC: NEGATIVE — SIGNIFICANT CHANGE UP
LEUKOCYTE ESTERASE UR-ACNC: NEGATIVE — SIGNIFICANT CHANGE UP
LG PLATELETS BLD QL AUTO: SLIGHT — SIGNIFICANT CHANGE UP
LYMPHOCYTES # BLD AUTO: 1.18 K/UL — SIGNIFICANT CHANGE UP (ref 1–3.3)
LYMPHOCYTES # BLD AUTO: 12.6 % — LOW (ref 13–44)
LYMPHOCYTES NFR SPEC AUTO: 14 % — SIGNIFICANT CHANGE UP (ref 13–44)
MACROCYTES BLD QL: SIGNIFICANT CHANGE UP
MANUAL SMEAR VERIFICATION: SIGNIFICANT CHANGE UP
MCHC RBC-ENTMCNC: 29.2 PG — SIGNIFICANT CHANGE UP (ref 27–34)
MCHC RBC-ENTMCNC: 31.8 % — LOW (ref 32–36)
MCV RBC AUTO: 91.8 FL — SIGNIFICANT CHANGE UP (ref 80–100)
MICROCYTES BLD QL: SLIGHT — SIGNIFICANT CHANGE UP
MONOCYTES # BLD AUTO: 1.13 K/UL — HIGH (ref 0–0.9)
MONOCYTES NFR BLD AUTO: 12 % — SIGNIFICANT CHANGE UP (ref 2–14)
MONOCYTES NFR BLD: 3 % — SIGNIFICANT CHANGE UP (ref 2–9)
MUCOUS THREADS # UR AUTO: SIGNIFICANT CHANGE UP
MYELOCYTES NFR BLD: 1 % — HIGH (ref 0–0)
NEUTROPHIL AB SER-ACNC: 77 % — SIGNIFICANT CHANGE UP (ref 43–77)
NEUTROPHILS # BLD AUTO: 6.6 K/UL — SIGNIFICANT CHANGE UP (ref 1.8–7.4)
NEUTROPHILS NFR BLD AUTO: 70.4 % — SIGNIFICANT CHANGE UP (ref 43–77)
NITRITE UR-MCNC: NEGATIVE — SIGNIFICANT CHANGE UP
PH UR: 6 — SIGNIFICANT CHANGE UP (ref 4.6–8)
PLATELET # BLD AUTO: 241 K/UL — SIGNIFICANT CHANGE UP (ref 150–400)
PLATELET COUNT - ESTIMATE: NORMAL — SIGNIFICANT CHANGE UP
PMV BLD: 10.5 FL — SIGNIFICANT CHANGE UP (ref 7–13)
POLYCHROMASIA BLD QL SMEAR: SLIGHT — SIGNIFICANT CHANGE UP
POTASSIUM SERPL-MCNC: 5.5 MMOL/L — HIGH (ref 3.5–5.3)
POTASSIUM SERPL-SCNC: 5.5 MMOL/L — HIGH (ref 3.5–5.3)
PROT SERPL-MCNC: 6.8 G/DL — SIGNIFICANT CHANGE UP (ref 6–8.3)
PROT UR-MCNC: 30 — HIGH
PROTHROM AB SERPL-ACNC: 37.6 SEC — HIGH (ref 9.8–13.1)
RBC # BLD: 3.77 M/UL — LOW (ref 3.8–5.2)
RBC # FLD: 16.6 % — HIGH (ref 10.3–14.5)
RBC CASTS # UR COMP ASSIST: SIGNIFICANT CHANGE UP (ref 0–?)
SODIUM SERPL-SCNC: 138 MMOL/L — SIGNIFICANT CHANGE UP (ref 135–145)
SP GR SPEC: 1.01 — SIGNIFICANT CHANGE UP (ref 1–1.03)
SQUAMOUS # UR AUTO: SIGNIFICANT CHANGE UP
TARGETS BLD QL SMEAR: SLIGHT — SIGNIFICANT CHANGE UP
TROPONIN T SERPL-MCNC: < 0.06 NG/ML — SIGNIFICANT CHANGE UP (ref 0–0.06)
UROBILINOGEN FLD QL: NORMAL E.U. — SIGNIFICANT CHANGE UP (ref 0.1–0.2)
VARIANT LYMPHS # BLD: 1 % — SIGNIFICANT CHANGE UP
WBC # BLD: 9.39 K/UL — SIGNIFICANT CHANGE UP (ref 3.8–10.5)
WBC # FLD AUTO: 9.39 K/UL — SIGNIFICANT CHANGE UP (ref 3.8–10.5)
WBC UR QL: SIGNIFICANT CHANGE UP (ref 0–?)

## 2017-05-07 PROCEDURE — 71010: CPT | Mod: 26

## 2017-05-07 PROCEDURE — 70450 CT HEAD/BRAIN W/O DYE: CPT | Mod: 26

## 2017-05-07 PROCEDURE — 72170 X-RAY EXAM OF PELVIS: CPT | Mod: 26

## 2017-05-07 PROCEDURE — 72125 CT NECK SPINE W/O DYE: CPT | Mod: 26

## 2017-05-07 RX ORDER — BUDESONIDE AND FORMOTEROL FUMARATE DIHYDRATE 160; 4.5 UG/1; UG/1
2 AEROSOL RESPIRATORY (INHALATION)
Qty: 0 | Refills: 0 | Status: DISCONTINUED | OUTPATIENT
Start: 2017-05-07 | End: 2017-05-12

## 2017-05-07 RX ORDER — FUROSEMIDE 40 MG
40 TABLET ORAL DAILY
Qty: 0 | Refills: 0 | Status: DISCONTINUED | OUTPATIENT
Start: 2017-05-07 | End: 2017-05-09

## 2017-05-07 RX ORDER — ALBUTEROL 90 UG/1
2 AEROSOL, METERED ORAL EVERY 6 HOURS
Qty: 0 | Refills: 0 | Status: DISCONTINUED | OUTPATIENT
Start: 2017-05-07 | End: 2017-05-12

## 2017-05-07 RX ORDER — SODIUM CHLORIDE 9 MG/ML
500 INJECTION INTRAMUSCULAR; INTRAVENOUS; SUBCUTANEOUS ONCE
Qty: 0 | Refills: 0 | Status: COMPLETED | OUTPATIENT
Start: 2017-05-07 | End: 2017-05-07

## 2017-05-07 RX ORDER — FERROUS SULFATE 325(65) MG
325 TABLET ORAL DAILY
Qty: 0 | Refills: 0 | Status: DISCONTINUED | OUTPATIENT
Start: 2017-05-07 | End: 2017-05-12

## 2017-05-07 RX ORDER — TETANUS TOXOID, REDUCED DIPHTHERIA TOXOID AND ACELLULAR PERTUSSIS VACCINE, ADSORBED 5; 2.5; 8; 8; 2.5 [IU]/.5ML; [IU]/.5ML; UG/.5ML; UG/.5ML; UG/.5ML
0.5 SUSPENSION INTRAMUSCULAR ONCE
Qty: 0 | Refills: 0 | Status: COMPLETED | OUTPATIENT
Start: 2017-05-07 | End: 2017-05-07

## 2017-05-07 RX ORDER — METOPROLOL TARTRATE 50 MG
50 TABLET ORAL
Qty: 0 | Refills: 0 | Status: DISCONTINUED | OUTPATIENT
Start: 2017-05-07 | End: 2017-05-12

## 2017-05-07 RX ORDER — IPRATROPIUM/ALBUTEROL SULFATE 18-103MCG
3 AEROSOL WITH ADAPTER (GRAM) INHALATION EVERY 6 HOURS
Qty: 0 | Refills: 0 | Status: DISCONTINUED | OUTPATIENT
Start: 2017-05-07 | End: 2017-05-12

## 2017-05-07 RX ORDER — DILTIAZEM HCL 120 MG
300 CAPSULE, EXT RELEASE 24 HR ORAL DAILY
Qty: 0 | Refills: 0 | Status: DISCONTINUED | OUTPATIENT
Start: 2017-05-07 | End: 2017-05-12

## 2017-05-07 RX ADMIN — TETANUS TOXOID, REDUCED DIPHTHERIA TOXOID AND ACELLULAR PERTUSSIS VACCINE, ADSORBED 0.5 MILLILITER(S): 5; 2.5; 8; 8; 2.5 SUSPENSION INTRAMUSCULAR at 16:34

## 2017-05-07 RX ADMIN — BUDESONIDE AND FORMOTEROL FUMARATE DIHYDRATE 2 PUFF(S): 160; 4.5 AEROSOL RESPIRATORY (INHALATION) at 23:24

## 2017-05-07 RX ADMIN — SODIUM CHLORIDE 500 MILLILITER(S): 9 INJECTION INTRAMUSCULAR; INTRAVENOUS; SUBCUTANEOUS at 18:58

## 2017-05-07 NOTE — ED PROVIDER NOTE - MEDICAL DECISION MAKING DETAILS
Pt with recent rehab discharge, progressive weakness with trip and fall today likely 2/2 increased weakness, possible deconditioning. No CP concerning for ACS, no abd pain/dysuria suggestive of UTI. Will check labs and UA, CT head/c-spine r/o ICH/fx as pt on Coumadin, likely admission for deconditioning and inability to walk

## 2017-05-07 NOTE — ED ADULT NURSE REASSESSMENT NOTE - NS ED NURSE REASSESS COMMENT FT1
Pt. is admitted to tele bed 402A. Report given to XUAN Knight. 500 mL bolus of 0.9% Normal saline infusing. Pt. does not appear to be in distress at this time. Respirations are even and unlabored. Pt. denies pain. Daughter at bedside.

## 2017-05-07 NOTE — ED ADULT NURSE NOTE - OBJECTIVE STATEMENT
Patient received in room 2. Pt. A&O x3 and ambulatory with a cane at baseline. Pt. had a witnessed fall coming home from [employer] this afternoon. Daughter states the pt. tripped and hit the back of her head on the concrete step and has not walked since the fall. Daughter also states prior to this fall the pt. was hospitalized with pneumonia and has been going to rehab. Denies loss of consciousness, chest pain, SOB, dizziness. Pt. placed on cardiac monitor. VS as noted. Daughter at bedside. Patient received in room 2. Pt. A&O x3 and ambulatory with a cane at baseline. Pt. had a witnessed fall coming home from [employer] this afternoon. Daughter states the pt. tripped and hit the back of her head on the concrete step and has not walked since the fall. Daughter also states prior to this fall the pt. was hospitalized with pneumonia and has been going to rehab. Denies loss of consciousness, chest pain, SOB, dizziness, n/v, burning on urination, changes in urination pattern. Respirations are even and unlabored. Small abrasion to back of head. Pt. placed on cardiac monitor. VS as noted. Daughter at bedside. Patient received in room 2. Pt. A&O x3 and ambulatory with a cane at baseline. Pt. had a witnessed fall coming home from [employer] this afternoon. Daughter states the pt. tripped and hit the back of her head on the concrete step and has not walked since the fall. Daughter also states prior to this fall the pt. was hospitalized with pneumonia and has been going to rehab. Denies loss of consciousness, chest pain, SOB, dizziness, n/v, burning on urination, changes in urination pattern. Respirations are even and unlabored. Lungs are clear to auscultation. Abdomen is soft, nontender, and bowel sounds are present x4 quadrants. Skin is dry and intact. Small laceration to back of right side of head that is not currently bleeding. Pt. denies pain at this time. Pt. placed on cardiac monitor with paced rhythm. 22 gauge IV placed in right wrist. Labs drawn and sent. VS as noted. Daughter at bedside. Patient received in room 2. Pt. A&O x3 and ambulatory with a cane at baseline. Pt. had a witnessed fall coming home from [employer] this afternoon. Daughter states the pt. tripped and hit the back of her head on the concrete step and has not walked since the fall. Daughter also states prior to this fall the pt. was hospitalized with pneumonia and has been going to rehab. Denies loss of consciousness, chest pain, SOB, dizziness, n/v, burning on urination, changes in urination pattern. Respirations are even and unlabored. Lungs are clear to auscultation. Abdomen is soft, nontender, and bowel sounds are present x4 quadrants. Skin is dry and intact with noted 2+ pitting edema b/l lower extremities. Small laceration to back of right side of head that is not currently bleeding. Pt. denies pain at this time. Pt. placed on cardiac monitor with paced rhythm. 22 gauge IV placed in right wrist. Labs drawn and sent. VS as noted. Daughter at bedside.

## 2017-05-07 NOTE — ED ADULT NURSE NOTE - CHIEF COMPLAINT QUOTE
Pt fell come home from [employer] this afternoon, as per daughter pt tripped and hit the back of her head on the concrete.  Pt does not recall falling, currently taking Coumadin

## 2017-05-07 NOTE — ED PROVIDER NOTE - OBJECTIVE STATEMENT
89F with HTN, AVR, MVR, on Coumadin p/w fall and head injury. Pt just discharged from rehab 2 days ago, has been progressively weaker at home since. Today was walking up stairs and tripped over new door threshold, fell back and hit back of head. Has not tried ambulation since. Denies LOC, vision change, fever, dysuria, abd pain, vomiting, CP, neck pain. Reports mild pain back of head with some bleeding. Last tetanus unknown.

## 2017-05-07 NOTE — H&P ADULT. - HISTORY OF PRESENT ILLNESS
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 Pt states she was walking up the stairs and she lost her balance and fell back and hit the back of her head. Pt states she did not lose consciousness. As per daughter, patient was discharged on friday from rehab and has been feeling progressively weaker. Pt denies fever, chills, N/V/D/C, numbness, tingling, chest pain, shortness of breath, palpitations, dysuria, urinary/bowel incontinence or any other complaints.

## 2017-05-07 NOTE — H&P ADULT. - ASSESSMENT
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

## 2017-05-07 NOTE — H&P ADULT. - NEGATIVE MUSCULOSKELETAL SYMPTOMS
no back pain/no arthralgia/no joint swelling/no arm pain L/no arm pain R/no leg pain R/no muscle weakness/no stiffness/no muscle cramps/no neck pain/no myalgia/no leg pain L/no arthritis

## 2017-05-07 NOTE — H&P ADULT. - GASTROINTESTINAL DETAILS
normal/bowel sounds normal/no rebound tenderness/no distention/soft/nontender/no masses palpable/no rigidity/no guarding

## 2017-05-07 NOTE — H&P ADULT. - PMH
Atrial Fibrillation  paroxysmal, on pradaxa  CAD (Coronary Artery Disease)    CHF (congestive heart failure)    COPD (Chronic Obstructive Pulmonary Disease)  2nd hand smoking  CVA (Cerebral Infarction)  Rt posterior cerebral artery  Dyslipidemia    Heart Block AV Third Degree  S/P PPm  HTN (Hypertension)    PVD (Peripheral Vascular Disease)

## 2017-05-07 NOTE — H&P ADULT. - RS GEN PE MLT RESP DETAILS PC
breath sounds equal/no rales/normal/airway patent/no wheezes/good air movement/clear to auscultation bilaterally/respirations non-labored/no intercostal retractions/no rhonchi/no chest wall tenderness

## 2017-05-07 NOTE — ED ADULT TRIAGE NOTE - CHIEF COMPLAINT QUOTE
Pt fell come home from [employer] this afternoon, as per daughter pt tripped and hit the back of her head on the concrete.  Pt does not recall falling, currently taking Coumadin

## 2017-05-07 NOTE — H&P ADULT. - PROBLEM SELECTOR PLAN 3
Patient is currently not fluid overloaded.  Strict I and Os and daily weights.   Continue with lasix 40 mg QD.

## 2017-05-07 NOTE — H&P ADULT. - PROBLEM SELECTOR PLAN 1
Admit to telemetry.   Orthostatics every 24 hours. Previous echo from April 2017 reviewed.   CXR, pelvic xray, CT head  check cbc,tsh, lipid, hemoglobin a1c, bmp with  and cuco  f/u MD note

## 2017-05-07 NOTE — H&P ADULT. - NEGATIVE OPHTHALMOLOGIC SYMPTOMS
no pain R/no pain L/no lacrimation L/no discharge L/no loss of vision R/no irritation R/no irritation L/no blurred vision R/no lacrimation R/no diplopia/no loss of vision L/no discharge R/no photophobia/no blurred vision L

## 2017-05-07 NOTE — H&P ADULT. - NEGATIVE NEUROLOGICAL SYMPTOMS
no focal seizures/no tremors/no transient paralysis/no loss of consciousness/no loss of sensation/no paresthesias/no hemiparesis/no syncope/no vertigo/no headache/no confusion/no weakness/no facial palsy/no generalized seizures/no difficulty walking

## 2017-05-07 NOTE — ED PROVIDER NOTE - ATTENDING CONTRIBUTION TO CARE
Dr. Santamaria: I have personally performed a face to face bedside history and physical examination of this patient. I have discussed the history, examination, review of systems, assessment and plan of management with the resident. I have reviewed the electronic medical record and amended it to reflect my history, review of systems, physical exam, assessment and plan.  89F h/o HTN, AVR, MVR, Afib, on Coumadin, recently admitted for PNA dc'ed to rehab and 2 days ago dc'ed home p/w fall on 1 step today due to generalized weakness. Pt fell backwards, hit her head, +abrasion to back of head, +LOC. No prolonged down time. Did not try walking after fall. No cp or sob, no fevers or chills, no dysuria or hematuria. Accompanied by daughter who is her health care proxy, has MOLST form with DNR. Daughter states she feels the pt was not ready to be dc'ed home from rehab.  On exam pt appears well, nad, irreg irreg, +abrasion to R occiput, no C spine ttp, abdo soft/nt/nd, pelvis stable, no pain with ROM of bilateral hips.  Plan - CT head and C spine, UA, basic labs, Susan, reassess, likely admit

## 2017-05-08 LAB
APTT BLD: 33.7 SEC — SIGNIFICANT CHANGE UP (ref 27.5–37.4)
BASOPHILS # BLD AUTO: 0.08 K/UL — SIGNIFICANT CHANGE UP (ref 0–0.2)
BASOPHILS NFR BLD AUTO: 0.8 % — SIGNIFICANT CHANGE UP (ref 0–2)
BUN SERPL-MCNC: 33 MG/DL — HIGH (ref 7–23)
BUN SERPL-MCNC: 35 MG/DL — HIGH (ref 7–23)
CALCIUM SERPL-MCNC: 9.5 MG/DL — SIGNIFICANT CHANGE UP (ref 8.4–10.5)
CALCIUM SERPL-MCNC: 9.6 MG/DL — SIGNIFICANT CHANGE UP (ref 8.4–10.5)
CHLORIDE SERPL-SCNC: 102 MMOL/L — SIGNIFICANT CHANGE UP (ref 98–107)
CHLORIDE SERPL-SCNC: 103 MMOL/L — SIGNIFICANT CHANGE UP (ref 98–107)
CK MB BLD-MCNC: 1.62 NG/ML — SIGNIFICANT CHANGE UP (ref 1–4.7)
CK MB BLD-MCNC: SIGNIFICANT CHANGE UP (ref 0–2.5)
CK SERPL-CCNC: 31 U/L — SIGNIFICANT CHANGE UP (ref 25–170)
CO2 SERPL-SCNC: 23 MMOL/L — SIGNIFICANT CHANGE UP (ref 22–31)
CO2 SERPL-SCNC: 24 MMOL/L — SIGNIFICANT CHANGE UP (ref 22–31)
CREAT SERPL-MCNC: 1.47 MG/DL — HIGH (ref 0.5–1.3)
CREAT SERPL-MCNC: 1.57 MG/DL — HIGH (ref 0.5–1.3)
EOSINOPHIL # BLD AUTO: 1.04 K/UL — HIGH (ref 0–0.5)
EOSINOPHIL NFR BLD AUTO: 10.7 % — HIGH (ref 0–6)
GLUCOSE SERPL-MCNC: 110 MG/DL — HIGH (ref 70–99)
GLUCOSE SERPL-MCNC: 93 MG/DL — SIGNIFICANT CHANGE UP (ref 70–99)
HCT VFR BLD CALC: 31.8 % — LOW (ref 34.5–45)
HGB BLD-MCNC: 10.2 G/DL — LOW (ref 11.5–15.5)
IMM GRANULOCYTES NFR BLD AUTO: 0.5 % — SIGNIFICANT CHANGE UP (ref 0–1.5)
INR BLD: 2.56 — HIGH (ref 0.88–1.17)
LYMPHOCYTES # BLD AUTO: 1.45 K/UL — SIGNIFICANT CHANGE UP (ref 1–3.3)
LYMPHOCYTES # BLD AUTO: 14.9 % — SIGNIFICANT CHANGE UP (ref 13–44)
MAGNESIUM SERPL-MCNC: 2 MG/DL — SIGNIFICANT CHANGE UP (ref 1.6–2.6)
MAGNESIUM SERPL-MCNC: 2 MG/DL — SIGNIFICANT CHANGE UP (ref 1.6–2.6)
MCHC RBC-ENTMCNC: 29.3 PG — SIGNIFICANT CHANGE UP (ref 27–34)
MCHC RBC-ENTMCNC: 32.1 % — SIGNIFICANT CHANGE UP (ref 32–36)
MCV RBC AUTO: 91.4 FL — SIGNIFICANT CHANGE UP (ref 80–100)
MONOCYTES # BLD AUTO: 1 K/UL — HIGH (ref 0–0.9)
MONOCYTES NFR BLD AUTO: 10.3 % — SIGNIFICANT CHANGE UP (ref 2–14)
NEUTROPHILS # BLD AUTO: 6.11 K/UL — SIGNIFICANT CHANGE UP (ref 1.8–7.4)
NEUTROPHILS NFR BLD AUTO: 62.8 % — SIGNIFICANT CHANGE UP (ref 43–77)
PHOSPHATE SERPL-MCNC: 3.6 MG/DL — SIGNIFICANT CHANGE UP (ref 2.5–4.5)
PHOSPHATE SERPL-MCNC: 3.6 MG/DL — SIGNIFICANT CHANGE UP (ref 2.5–4.5)
PLATELET # BLD AUTO: 264 K/UL — SIGNIFICANT CHANGE UP (ref 150–400)
PMV BLD: 10.8 FL — SIGNIFICANT CHANGE UP (ref 7–13)
POTASSIUM SERPL-MCNC: 4.5 MMOL/L — SIGNIFICANT CHANGE UP (ref 3.5–5.3)
POTASSIUM SERPL-MCNC: 5 MMOL/L — SIGNIFICANT CHANGE UP (ref 3.5–5.3)
POTASSIUM SERPL-SCNC: 4.5 MMOL/L — SIGNIFICANT CHANGE UP (ref 3.5–5.3)
POTASSIUM SERPL-SCNC: 5 MMOL/L — SIGNIFICANT CHANGE UP (ref 3.5–5.3)
PROTHROM AB SERPL-ACNC: 29.2 SEC — HIGH (ref 9.8–13.1)
RBC # BLD: 3.48 M/UL — LOW (ref 3.8–5.2)
RBC # FLD: 16.6 % — HIGH (ref 10.3–14.5)
SODIUM SERPL-SCNC: 141 MMOL/L — SIGNIFICANT CHANGE UP (ref 135–145)
SODIUM SERPL-SCNC: 141 MMOL/L — SIGNIFICANT CHANGE UP (ref 135–145)
WBC # BLD: 9.73 K/UL — SIGNIFICANT CHANGE UP (ref 3.8–10.5)
WBC # FLD AUTO: 9.73 K/UL — SIGNIFICANT CHANGE UP (ref 3.8–10.5)

## 2017-05-08 RX ORDER — WARFARIN SODIUM 2.5 MG/1
2 TABLET ORAL ONCE
Qty: 0 | Refills: 0 | Status: COMPLETED | OUTPATIENT
Start: 2017-05-08 | End: 2017-05-08

## 2017-05-08 RX ADMIN — Medication 40 MILLIGRAM(S): at 06:16

## 2017-05-08 RX ADMIN — BUDESONIDE AND FORMOTEROL FUMARATE DIHYDRATE 2 PUFF(S): 160; 4.5 AEROSOL RESPIRATORY (INHALATION) at 10:13

## 2017-05-08 RX ADMIN — Medication 1 TABLET(S): at 10:14

## 2017-05-08 RX ADMIN — Medication 50 MILLIGRAM(S): at 06:16

## 2017-05-08 RX ADMIN — Medication 325 MILLIGRAM(S): at 10:14

## 2017-05-08 RX ADMIN — WARFARIN SODIUM 2 MILLIGRAM(S): 2.5 TABLET ORAL at 17:29

## 2017-05-08 RX ADMIN — Medication 50 MILLIGRAM(S): at 17:29

## 2017-05-08 RX ADMIN — Medication 300 MILLIGRAM(S): at 06:16

## 2017-05-08 RX ADMIN — BUDESONIDE AND FORMOTEROL FUMARATE DIHYDRATE 2 PUFF(S): 160; 4.5 AEROSOL RESPIRATORY (INHALATION) at 21:25

## 2017-05-08 NOTE — PHYSICAL THERAPY INITIAL EVALUATION ADULT - LIVES WITH, PROFILE
Daughter in a house with a few steps to enter with HR and no stairs in house that patient has to use.

## 2017-05-08 NOTE — PHYSICAL THERAPY INITIAL EVALUATION ADULT - PERTINENT HX OF CURRENT PROBLEM, REHAB EVAL
was walking up the stairs and she lost her balance and fell back and hit the back of her head; Admit to telemetry for syncope workup

## 2017-05-09 LAB
BACTERIA UR CULT: SIGNIFICANT CHANGE UP
BUN SERPL-MCNC: 32 MG/DL — HIGH (ref 7–23)
CALCIUM SERPL-MCNC: 9.3 MG/DL — SIGNIFICANT CHANGE UP (ref 8.4–10.5)
CHLORIDE SERPL-SCNC: 103 MMOL/L — SIGNIFICANT CHANGE UP (ref 98–107)
CO2 SERPL-SCNC: 22 MMOL/L — SIGNIFICANT CHANGE UP (ref 22–31)
CREAT SERPL-MCNC: 1.46 MG/DL — HIGH (ref 0.5–1.3)
GLUCOSE SERPL-MCNC: 119 MG/DL — HIGH (ref 70–99)
HBV SURFACE AG SER-ACNC: NEGATIVE — SIGNIFICANT CHANGE UP
HCT VFR BLD CALC: 33.8 % — LOW (ref 34.5–45)
HCV AB S/CO SERPL IA: 0.09 S/CO — SIGNIFICANT CHANGE UP
HCV AB SERPL-IMP: SIGNIFICANT CHANGE UP
HGB BLD-MCNC: 10.6 G/DL — LOW (ref 11.5–15.5)
INR BLD: 2.62 — HIGH (ref 0.88–1.17)
MAGNESIUM SERPL-MCNC: 1.9 MG/DL — SIGNIFICANT CHANGE UP (ref 1.6–2.6)
MCHC RBC-ENTMCNC: 28.9 PG — SIGNIFICANT CHANGE UP (ref 27–34)
MCHC RBC-ENTMCNC: 31.4 % — LOW (ref 32–36)
MCV RBC AUTO: 92.1 FL — SIGNIFICANT CHANGE UP (ref 80–100)
NRBC FLD-RTO: 1 — SIGNIFICANT CHANGE UP
PHOSPHATE SERPL-MCNC: 3.6 MG/DL — SIGNIFICANT CHANGE UP (ref 2.5–4.5)
PLATELET # BLD AUTO: 286 K/UL — SIGNIFICANT CHANGE UP (ref 150–400)
PMV BLD: 10.7 FL — SIGNIFICANT CHANGE UP (ref 7–13)
POTASSIUM SERPL-MCNC: 4.3 MMOL/L — SIGNIFICANT CHANGE UP (ref 3.5–5.3)
POTASSIUM SERPL-SCNC: 4.3 MMOL/L — SIGNIFICANT CHANGE UP (ref 3.5–5.3)
PROTHROM AB SERPL-ACNC: 29.9 SEC — HIGH (ref 9.8–13.1)
RBC # BLD: 3.67 M/UL — LOW (ref 3.8–5.2)
RBC # FLD: 16.6 % — HIGH (ref 10.3–14.5)
SODIUM SERPL-SCNC: 142 MMOL/L — SIGNIFICANT CHANGE UP (ref 135–145)
SPECIMEN SOURCE: SIGNIFICANT CHANGE UP
WBC # BLD: 10.47 K/UL — SIGNIFICANT CHANGE UP (ref 3.8–10.5)
WBC # FLD AUTO: 10.47 K/UL — SIGNIFICANT CHANGE UP (ref 3.8–10.5)

## 2017-05-09 RX ORDER — EZETIMIBE 10 MG/1
10 TABLET ORAL DAILY
Qty: 0 | Refills: 0 | Status: DISCONTINUED | OUTPATIENT
Start: 2017-05-09 | End: 2017-05-12

## 2017-05-09 RX ORDER — WARFARIN SODIUM 2.5 MG/1
2 TABLET ORAL ONCE
Qty: 0 | Refills: 0 | Status: COMPLETED | OUTPATIENT
Start: 2017-05-09 | End: 2017-05-09

## 2017-05-09 RX ORDER — FUROSEMIDE 40 MG
40 TABLET ORAL DAILY
Qty: 0 | Refills: 0 | Status: DISCONTINUED | OUTPATIENT
Start: 2017-05-09 | End: 2017-05-11

## 2017-05-09 RX ADMIN — BUDESONIDE AND FORMOTEROL FUMARATE DIHYDRATE 2 PUFF(S): 160; 4.5 AEROSOL RESPIRATORY (INHALATION) at 09:01

## 2017-05-09 RX ADMIN — Medication 1 TABLET(S): at 11:40

## 2017-05-09 RX ADMIN — Medication 40 MILLIGRAM(S): at 06:49

## 2017-05-09 RX ADMIN — BUDESONIDE AND FORMOTEROL FUMARATE DIHYDRATE 2 PUFF(S): 160; 4.5 AEROSOL RESPIRATORY (INHALATION) at 21:19

## 2017-05-09 RX ADMIN — EZETIMIBE 10 MILLIGRAM(S): 10 TABLET ORAL at 17:05

## 2017-05-09 RX ADMIN — Medication 50 MILLIGRAM(S): at 06:49

## 2017-05-09 RX ADMIN — WARFARIN SODIUM 2 MILLIGRAM(S): 2.5 TABLET ORAL at 17:05

## 2017-05-09 RX ADMIN — Medication 325 MILLIGRAM(S): at 11:40

## 2017-05-09 RX ADMIN — Medication 50 MILLIGRAM(S): at 17:04

## 2017-05-09 RX ADMIN — Medication 300 MILLIGRAM(S): at 06:49

## 2017-05-10 LAB
BUN SERPL-MCNC: 28 MG/DL — HIGH (ref 7–23)
CALCIUM SERPL-MCNC: 9.1 MG/DL — SIGNIFICANT CHANGE UP (ref 8.4–10.5)
CHLORIDE SERPL-SCNC: 103 MMOL/L — SIGNIFICANT CHANGE UP (ref 98–107)
CO2 SERPL-SCNC: 24 MMOL/L — SIGNIFICANT CHANGE UP (ref 22–31)
CREAT SERPL-MCNC: 1.23 MG/DL — SIGNIFICANT CHANGE UP (ref 0.5–1.3)
GLUCOSE SERPL-MCNC: 140 MG/DL — HIGH (ref 70–99)
HCT VFR BLD CALC: 33.6 % — LOW (ref 34.5–45)
HGB BLD-MCNC: 10.5 G/DL — LOW (ref 11.5–15.5)
INR BLD: 2.53 — HIGH (ref 0.88–1.17)
MAGNESIUM SERPL-MCNC: 1.9 MG/DL — SIGNIFICANT CHANGE UP (ref 1.6–2.6)
MCHC RBC-ENTMCNC: 29 PG — SIGNIFICANT CHANGE UP (ref 27–34)
MCHC RBC-ENTMCNC: 31.3 % — LOW (ref 32–36)
MCV RBC AUTO: 92.8 FL — SIGNIFICANT CHANGE UP (ref 80–100)
NRBC FLD-RTO: 1.3 — SIGNIFICANT CHANGE UP
PLATELET # BLD AUTO: 281 K/UL — SIGNIFICANT CHANGE UP (ref 150–400)
PMV BLD: 10.3 FL — SIGNIFICANT CHANGE UP (ref 7–13)
POTASSIUM SERPL-MCNC: 3.5 MMOL/L — SIGNIFICANT CHANGE UP (ref 3.5–5.3)
POTASSIUM SERPL-SCNC: 3.5 MMOL/L — SIGNIFICANT CHANGE UP (ref 3.5–5.3)
PROTHROM AB SERPL-ACNC: 28.9 SEC — HIGH (ref 9.8–13.1)
RBC # BLD: 3.62 M/UL — LOW (ref 3.8–5.2)
RBC # FLD: 16.8 % — HIGH (ref 10.3–14.5)
SODIUM SERPL-SCNC: 143 MMOL/L — SIGNIFICANT CHANGE UP (ref 135–145)
WBC # BLD: 11.71 K/UL — HIGH (ref 3.8–10.5)
WBC # FLD AUTO: 11.71 K/UL — HIGH (ref 3.8–10.5)

## 2017-05-10 PROCEDURE — 93280 PM DEVICE PROGR EVAL DUAL: CPT | Mod: 26

## 2017-05-10 RX ORDER — WARFARIN SODIUM 2.5 MG/1
2 TABLET ORAL ONCE
Qty: 0 | Refills: 0 | Status: COMPLETED | OUTPATIENT
Start: 2017-05-10 | End: 2017-05-10

## 2017-05-10 RX ORDER — POTASSIUM CHLORIDE 20 MEQ
20 PACKET (EA) ORAL ONCE
Qty: 0 | Refills: 0 | Status: COMPLETED | OUTPATIENT
Start: 2017-05-10 | End: 2017-05-10

## 2017-05-10 RX ADMIN — EZETIMIBE 10 MILLIGRAM(S): 10 TABLET ORAL at 10:08

## 2017-05-10 RX ADMIN — WARFARIN SODIUM 2 MILLIGRAM(S): 2.5 TABLET ORAL at 17:52

## 2017-05-10 RX ADMIN — BUDESONIDE AND FORMOTEROL FUMARATE DIHYDRATE 2 PUFF(S): 160; 4.5 AEROSOL RESPIRATORY (INHALATION) at 10:08

## 2017-05-10 RX ADMIN — Medication 20 MILLIEQUIVALENT(S): at 10:08

## 2017-05-10 RX ADMIN — Medication 300 MILLIGRAM(S): at 05:26

## 2017-05-10 RX ADMIN — Medication 50 MILLIGRAM(S): at 17:52

## 2017-05-10 RX ADMIN — Medication 40 MILLIGRAM(S): at 05:26

## 2017-05-10 RX ADMIN — Medication 1 TABLET(S): at 10:08

## 2017-05-10 RX ADMIN — BUDESONIDE AND FORMOTEROL FUMARATE DIHYDRATE 2 PUFF(S): 160; 4.5 AEROSOL RESPIRATORY (INHALATION) at 21:39

## 2017-05-10 RX ADMIN — Medication 50 MILLIGRAM(S): at 05:26

## 2017-05-10 RX ADMIN — Medication 325 MILLIGRAM(S): at 10:08

## 2017-05-11 ENCOUNTER — TRANSCRIPTION ENCOUNTER (OUTPATIENT)
Age: 82
End: 2017-05-11

## 2017-05-11 LAB
BUN SERPL-MCNC: 28 MG/DL — HIGH (ref 7–23)
CALCIUM SERPL-MCNC: 9.2 MG/DL — SIGNIFICANT CHANGE UP (ref 8.4–10.5)
CHLORIDE SERPL-SCNC: 104 MMOL/L — SIGNIFICANT CHANGE UP (ref 98–107)
CO2 SERPL-SCNC: 26 MMOL/L — SIGNIFICANT CHANGE UP (ref 22–31)
CREAT SERPL-MCNC: 1.17 MG/DL — SIGNIFICANT CHANGE UP (ref 0.5–1.3)
FERRITIN SERPL-MCNC: 857.5 NG/ML — HIGH (ref 15–150)
GLUCOSE SERPL-MCNC: 124 MG/DL — HIGH (ref 70–99)
HCT VFR BLD CALC: 35.1 % — SIGNIFICANT CHANGE UP (ref 34.5–45)
HGB BLD-MCNC: 11 G/DL — LOW (ref 11.5–15.5)
INR BLD: 3 — HIGH (ref 0.88–1.17)
IRON SATN MFR SERPL: 181 UG/DL — SIGNIFICANT CHANGE UP (ref 140–530)
IRON SATN MFR SERPL: 24 UG/DL — LOW (ref 30–160)
MAGNESIUM SERPL-MCNC: 1.8 MG/DL — SIGNIFICANT CHANGE UP (ref 1.6–2.6)
MCHC RBC-ENTMCNC: 29.1 PG — SIGNIFICANT CHANGE UP (ref 27–34)
MCHC RBC-ENTMCNC: 31.3 % — LOW (ref 32–36)
MCV RBC AUTO: 92.9 FL — SIGNIFICANT CHANGE UP (ref 80–100)
PLATELET # BLD AUTO: 279 K/UL — SIGNIFICANT CHANGE UP (ref 150–400)
PMV BLD: 9.8 FL — SIGNIFICANT CHANGE UP (ref 7–13)
POTASSIUM SERPL-MCNC: 3.9 MMOL/L — SIGNIFICANT CHANGE UP (ref 3.5–5.3)
POTASSIUM SERPL-SCNC: 3.9 MMOL/L — SIGNIFICANT CHANGE UP (ref 3.5–5.3)
PROTHROM AB SERPL-ACNC: 34.4 SEC — HIGH (ref 9.8–13.1)
RBC # BLD: 3.78 M/UL — LOW (ref 3.8–5.2)
RBC # FLD: 17.1 % — HIGH (ref 10.3–14.5)
SODIUM SERPL-SCNC: 144 MMOL/L — SIGNIFICANT CHANGE UP (ref 135–145)
UIBC SERPL-MCNC: 157 UG/DL — SIGNIFICANT CHANGE UP (ref 110–370)
WBC # BLD: 12.39 K/UL — HIGH (ref 3.8–10.5)
WBC # FLD AUTO: 12.39 K/UL — HIGH (ref 3.8–10.5)

## 2017-05-11 RX ORDER — FUROSEMIDE 40 MG
40 TABLET ORAL DAILY
Qty: 0 | Refills: 0 | Status: DISCONTINUED | OUTPATIENT
Start: 2017-05-11 | End: 2017-05-12

## 2017-05-11 RX ADMIN — EZETIMIBE 10 MILLIGRAM(S): 10 TABLET ORAL at 11:52

## 2017-05-11 RX ADMIN — Medication 325 MILLIGRAM(S): at 11:52

## 2017-05-11 RX ADMIN — Medication 50 MILLIGRAM(S): at 05:49

## 2017-05-11 RX ADMIN — Medication 1 TABLET(S): at 11:52

## 2017-05-11 RX ADMIN — Medication 50 MILLIGRAM(S): at 17:50

## 2017-05-11 RX ADMIN — BUDESONIDE AND FORMOTEROL FUMARATE DIHYDRATE 2 PUFF(S): 160; 4.5 AEROSOL RESPIRATORY (INHALATION) at 22:22

## 2017-05-11 RX ADMIN — BUDESONIDE AND FORMOTEROL FUMARATE DIHYDRATE 2 PUFF(S): 160; 4.5 AEROSOL RESPIRATORY (INHALATION) at 11:52

## 2017-05-11 RX ADMIN — Medication 300 MILLIGRAM(S): at 05:49

## 2017-05-11 RX ADMIN — Medication 40 MILLIGRAM(S): at 05:49

## 2017-05-12 VITALS
HEART RATE: 72 BPM | DIASTOLIC BLOOD PRESSURE: 58 MMHG | TEMPERATURE: 98 F | RESPIRATION RATE: 18 BRPM | SYSTOLIC BLOOD PRESSURE: 114 MMHG | OXYGEN SATURATION: 97 %

## 2017-05-12 LAB
BASOPHILS # BLD AUTO: 0.06 K/UL — SIGNIFICANT CHANGE UP (ref 0–0.2)
BASOPHILS NFR BLD AUTO: 0.4 % — SIGNIFICANT CHANGE UP (ref 0–2)
BUN SERPL-MCNC: 25 MG/DL — HIGH (ref 7–23)
CALCIUM SERPL-MCNC: 9 MG/DL — SIGNIFICANT CHANGE UP (ref 8.4–10.5)
CHLORIDE SERPL-SCNC: 104 MMOL/L — SIGNIFICANT CHANGE UP (ref 98–107)
CO2 SERPL-SCNC: 25 MMOL/L — SIGNIFICANT CHANGE UP (ref 22–31)
CREAT SERPL-MCNC: 1.09 MG/DL — SIGNIFICANT CHANGE UP (ref 0.5–1.3)
EOSINOPHIL # BLD AUTO: 1.43 K/UL — HIGH (ref 0–0.5)
EOSINOPHIL NFR BLD AUTO: 10.6 % — HIGH (ref 0–6)
GLUCOSE SERPL-MCNC: 101 MG/DL — HIGH (ref 70–99)
HCT VFR BLD CALC: 32.6 % — LOW (ref 34.5–45)
HGB BLD-MCNC: 10.4 G/DL — LOW (ref 11.5–15.5)
IMM GRANULOCYTES NFR BLD AUTO: 1.2 % — SIGNIFICANT CHANGE UP (ref 0–1.5)
INR BLD: 2.48 — HIGH (ref 0.88–1.17)
LYMPHOCYTES # BLD AUTO: 1.96 K/UL — SIGNIFICANT CHANGE UP (ref 1–3.3)
LYMPHOCYTES # BLD AUTO: 14.5 % — SIGNIFICANT CHANGE UP (ref 13–44)
MAGNESIUM SERPL-MCNC: 1.9 MG/DL — SIGNIFICANT CHANGE UP (ref 1.6–2.6)
MCHC RBC-ENTMCNC: 29.6 PG — SIGNIFICANT CHANGE UP (ref 27–34)
MCHC RBC-ENTMCNC: 31.9 % — LOW (ref 32–36)
MCV RBC AUTO: 92.9 FL — SIGNIFICANT CHANGE UP (ref 80–100)
MONOCYTES # BLD AUTO: 1.19 K/UL — HIGH (ref 0–0.9)
MONOCYTES NFR BLD AUTO: 8.8 % — SIGNIFICANT CHANGE UP (ref 2–14)
NEUTROPHILS # BLD AUTO: 8.69 K/UL — HIGH (ref 1.8–7.4)
NEUTROPHILS NFR BLD AUTO: 64.5 % — SIGNIFICANT CHANGE UP (ref 43–77)
PLATELET # BLD AUTO: 274 K/UL — SIGNIFICANT CHANGE UP (ref 150–400)
PMV BLD: 10.2 FL — SIGNIFICANT CHANGE UP (ref 7–13)
POTASSIUM SERPL-MCNC: 3.7 MMOL/L — SIGNIFICANT CHANGE UP (ref 3.5–5.3)
POTASSIUM SERPL-SCNC: 3.7 MMOL/L — SIGNIFICANT CHANGE UP (ref 3.5–5.3)
PROTHROM AB SERPL-ACNC: 28.3 SEC — HIGH (ref 9.8–13.1)
RBC # BLD: 3.51 M/UL — LOW (ref 3.8–5.2)
RBC # FLD: 17.5 % — HIGH (ref 10.3–14.5)
SODIUM SERPL-SCNC: 143 MMOL/L — SIGNIFICANT CHANGE UP (ref 135–145)
WBC # BLD: 13.49 K/UL — HIGH (ref 3.8–10.5)
WBC # FLD AUTO: 13.49 K/UL — HIGH (ref 3.8–10.5)

## 2017-05-12 RX ORDER — DILTIAZEM HCL 120 MG
1 CAPSULE, EXT RELEASE 24 HR ORAL
Qty: 30 | Refills: 0 | OUTPATIENT
Start: 2017-05-12 | End: 2017-06-11

## 2017-05-12 RX ORDER — POTASSIUM CHLORIDE 20 MEQ
20 PACKET (EA) ORAL ONCE
Qty: 0 | Refills: 0 | Status: DISCONTINUED | OUTPATIENT
Start: 2017-05-12 | End: 2017-05-12

## 2017-05-12 RX ORDER — IPRATROPIUM BROMIDE 0.2 MG/ML
2.5 SOLUTION, NON-ORAL INHALATION
Qty: 0 | Refills: 0 | COMMUNITY

## 2017-05-12 RX ORDER — POTASSIUM CHLORIDE 20 MEQ
20 PACKET (EA) ORAL ONCE
Qty: 0 | Refills: 0 | Status: COMPLETED | OUTPATIENT
Start: 2017-05-12 | End: 2017-05-12

## 2017-05-12 RX ORDER — WARFARIN SODIUM 2.5 MG/1
1 TABLET ORAL
Qty: 0 | Refills: 0 | COMMUNITY

## 2017-05-12 RX ORDER — ALBUTEROL 90 UG/1
3 AEROSOL, METERED ORAL
Qty: 0 | Refills: 0 | COMMUNITY

## 2017-05-12 RX ORDER — WARFARIN SODIUM 2.5 MG/1
2 TABLET ORAL ONCE
Qty: 0 | Refills: 0 | Status: DISCONTINUED | OUTPATIENT
Start: 2017-05-12 | End: 2017-05-12

## 2017-05-12 RX ADMIN — EZETIMIBE 10 MILLIGRAM(S): 10 TABLET ORAL at 10:59

## 2017-05-12 RX ADMIN — Medication 1 TABLET(S): at 10:57

## 2017-05-12 RX ADMIN — Medication 20 MILLIEQUIVALENT(S): at 13:35

## 2017-05-12 RX ADMIN — Medication 40 MILLIGRAM(S): at 05:02

## 2017-05-12 RX ADMIN — Medication 325 MILLIGRAM(S): at 10:57

## 2017-05-12 RX ADMIN — BUDESONIDE AND FORMOTEROL FUMARATE DIHYDRATE 2 PUFF(S): 160; 4.5 AEROSOL RESPIRATORY (INHALATION) at 10:59

## 2017-05-12 RX ADMIN — Medication 300 MILLIGRAM(S): at 05:02

## 2017-05-12 RX ADMIN — Medication 50 MILLIGRAM(S): at 05:02

## 2017-05-12 NOTE — DISCHARGE NOTE ADULT - NS AS ACTIVITY OBS
Stairs allowed/Walking-Indoors allowed/Walking-Outdoors allowed/Showering allowed/activity as tolerated

## 2017-05-12 NOTE — DISCHARGE NOTE ADULT - SECONDARY DIAGNOSIS.
Atrial Fibrillation CHF (congestive heart failure) COPD (Chronic Obstructive Pulmonary Disease) HTN (Hypertension) Dyslipidemia

## 2017-05-12 NOTE — DISCHARGE NOTE ADULT - CARE PROVIDER_API CALL
Gerald Nixon (DOROTHY), [facility] Medicine; Internal Medicine  [address] [place] [address]  Phone: [phone]  Fax: [phone]    Ubaldo Iqbal), Cardiovascular Disease; Internal Medicine; Interventional Cardiology; Nuclear Cardiology  [address] [facility] [address]  [place] [address]  Phone: [phone]  Fax: [phone] Ubaldo Iqbal (MD), Cardiovascular Disease; Internal Medicine; Interventional Cardiology; Nuclear Cardiology  [address] [facility] [address]  [place] [address]  Phone: [phone]  Fax: [phone]    Jason Renner,   PCP  Phone: (   )    -  Fax: (       -

## 2017-05-12 NOTE — DISCHARGE NOTE ADULT - PATIENT PORTAL LINK FT
“You can access the FollowHealth Patient Portal, offered by [facility], by registering with the following website: [email or web address]”

## 2017-05-12 NOTE — DISCHARGE NOTE ADULT - COMMUNITY RESOURCES
[facility] [phone] for 4:30pm pickup. [facility] [trip#[id number]] [phone] for 4:30pm pickup.

## 2017-05-12 NOTE — DISCHARGE NOTE ADULT - PLAN OF CARE
prevent future occurrences Keep hydrated to prevent dehydration. Sit or lie down right away if feeling dizzy or faint, move slowly and let yourself get used to one position before you move to another position, move your legs often if you must sit or  one position for a long time. Avoid exercise outside on a hot day. Follow up with primary care provider in 1 week. Please continue to take your medications as prescribed, Low salt, low fat diet, exercise as tolerated. Do not smoke, limit alcohol and caffeine. Monitor Blood pressure and pulse. Follow up with your cardiologist in 1 week. follow up with your cardiologist in 1-2 weeks. Continue medications as prescribed. Continue low sodium diet. monitor fluid intake, monitor weight every morning. Monitor blood pressure. Do not smoke. Please inform your doctors with any new or worsening symptoms such as shortness of breath or leg swelling. Continue current medications as prescribed. Follow up with primary care provider in 1 week. Continue medications as prescribed. Monitor Blood pressure. Eat a low salt diet. Exercise to maintain a healthy weight. Limit alcohol and do not smoke. Follow up with primary care physician in 1 week. If any worsening symptoms notify provider. Continue with medications as prescribed. Decrease the total number of fat you eat, include fish in your diet, eat foods with high fiber. Limit alcohol and do not smoke. Maintain a healthy weight and exercise regularly. Follow up with primary care provider in 1 week. remain euvolemic, compliance with meds continue lasix 40mg daily, daily weights, 1500ml fluid restriction  follow up with Cardiologist in 1-2 weeks Please continue to take your medications as prescribed, your Cardizem was decreased to 300mg daily and continue Metoprolol 50mg 2 x a day. continue your coumadin daily with a goal INR 2-3.  Follow up with your cardiologist in 1-2 weeks, have your INR checked by Monday. Continue your inhaler as prescribed. Continue medications as prescribed. Monitor Blood pressure. Eat a low salt diet.

## 2017-05-12 NOTE — DISCHARGE NOTE ADULT - ADDITIONAL INSTRUCTIONS
Please have INR checked by Monday with PCP Dr. Renner   follow up with Cardiologist Dr. Iqbal in 1-2 weeks

## 2017-05-12 NOTE — DISCHARGE NOTE ADULT - CARE PLAN
Principal Discharge DX:	Weakness  Goal:	prevent future occurrences  Instructions for follow-up, activity and diet:	Keep hydrated to prevent dehydration. Sit or lie down right away if feeling dizzy or faint, move slowly and let yourself get used to one position before you move to another position, move your legs often if you must sit or  one position for a long time. Avoid exercise outside on a hot day. Follow up with primary care provider in 1 week.  Secondary Diagnosis:	Atrial Fibrillation  Instructions for follow-up, activity and diet:	Please continue to take your medications as prescribed, Low salt, low fat diet, exercise as tolerated. Do not smoke, limit alcohol and caffeine. Monitor Blood pressure and pulse. Follow up with your cardiologist in 1 week.  Secondary Diagnosis:	CHF (congestive heart failure)  Instructions for follow-up, activity and diet:	follow up with your cardiologist in 1-2 weeks. Continue medications as prescribed. Continue low sodium diet. monitor fluid intake, monitor weight every morning. Monitor blood pressure. Do not smoke. Please inform your doctors with any new or worsening symptoms such as shortness of breath or leg swelling.  Secondary Diagnosis:	COPD (Chronic Obstructive Pulmonary Disease)  Instructions for follow-up, activity and diet:	Continue current medications as prescribed. Follow up with primary care provider in 1 week.  Secondary Diagnosis:	HTN (Hypertension)  Instructions for follow-up, activity and diet:	Continue medications as prescribed. Monitor Blood pressure. Eat a low salt diet. Exercise to maintain a healthy weight. Limit alcohol and do not smoke. Follow up with primary care physician in 1 week. If any worsening symptoms notify provider.  Secondary Diagnosis:	Dyslipidemia  Instructions for follow-up, activity and diet:	Continue with medications as prescribed. Decrease the total number of fat you eat, include fish in your diet, eat foods with high fiber. Limit alcohol and do not smoke. Maintain a healthy weight and exercise regularly. Follow up with primary care provider in 1 week. Principal Discharge DX:	Acute on chronic diastolic congestive heart failure  Goal:	remain euvolemic, compliance with meds  Instructions for follow-up, activity and diet:	continue lasix 40mg daily, daily weights, 1500ml fluid restriction  follow up with Cardiologist in 1-2 weeks  Secondary Diagnosis:	Atrial Fibrillation  Instructions for follow-up, activity and diet:	Please continue to take your medications as prescribed, your Cardizem was decreased to 300mg daily and continue Metoprolol 50mg 2 x a day. continue your coumadin daily with a goal INR 2-3.  Follow up with your cardiologist in 1-2 weeks, have your INR checked by Monday.  Secondary Diagnosis:	COPD (Chronic Obstructive Pulmonary Disease)  Instructions for follow-up, activity and diet:	Continue your inhaler as prescribed.  Secondary Diagnosis:	HTN (Hypertension)  Instructions for follow-up, activity and diet:	Continue medications as prescribed. Monitor Blood pressure. Eat a low salt diet.

## 2017-05-12 NOTE — DISCHARGE NOTE ADULT - HOSPITAL COURSE
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 CT head showed no gross evidence for calvarial fracture, acute intracranial hemorrhage, or acute infarct. An old right parieto-occipital infarct is present. If symptoms persist, consider short interval follow-up exam. On the cervical spine CT, there is no evidence for acute cervical spine fracture. Degenerative changes as described. ACS ruled out by two sets of negative cardiac enzymes. EKG paced @ 76 bpm CXR showed clear lungs. Patient found to have acute on chronic diastolic HF started on IV Lasix 40mg qd then transitioned to PO Lasix. Patient stable and cleared for discharge home with PT.

## 2017-05-12 NOTE — DISCHARGE NOTE ADULT - PROVIDER TOKENS
TOKEN:'[id number]',TOKEN:'[id number]' TOKEN:'[id number]',FREE:[LAST:[Jason Renner],PHONE:[(   )    -],FAX:[(   )    -],ADDRESS:[PCP]]

## 2017-05-12 NOTE — DISCHARGE NOTE ADULT - HOME CARE AGENCY
[facility] [phone] for RN PT & HHA services. RN to visit day after discharge.

## 2017-05-12 NOTE — DISCHARGE NOTE ADULT - INSTRUCTIONS
low salt, low cholesterol diet pt to report any unusual symptoms such as pain, swelling, dizzines etc to MD/911

## 2017-05-12 NOTE — DISCHARGE NOTE ADULT - NS AS DC HF EDUCATION INSTRUCTIONS
Low salt diet/Report weight gain of 2 or more pounds in one day or 3 or more pounds in one week, worsening shortness of breath, fatigue, weakness, increased swelling of hands and feet to primary care provider/Activities as tolerated/Monitor Weight Daily/Call Primary Care Provider for follow-up after discharge

## 2017-05-12 NOTE — DISCHARGE NOTE ADULT - MEDICATION SUMMARY - MEDICATIONS TO TAKE
I will START or STAY ON the medications listed below when I get home from the hospital:    dilTIAZem 300 mg/24 hours oral capsule, extended release  -- 1 cap(s) by mouth once a day  -- Indication: For Atrial Fibrillation    warfarin 2 mg oral tablet  -- 1 tab(s) by mouth once a day   -- Indication: For Atrial Fibrillation    Zetia 10 mg oral tablet  -- 1 tab(s) by mouth once a day  -- Indication: For Hyperlipidemia     metoprolol tartrate 50 mg oral tablet  -- 1 tab(s) by mouth 2 times a day  -- Indication: For Atrial Fibrillation    Ventolin HFA 90 mcg/inh inhalation aerosol  -- 2 puff(s) inhaled 4 times a day, As Needed  -- Indication: For COPD (Chronic Obstructive Pulmonary Disease)    Advair Diskus 100 mcg-50 mcg inhalation powder  -- 1 puff(s) inhaled 2 times a day  -- Indication: For COPD (Chronic Obstructive Pulmonary Disease)    furosemide 40 mg oral tablet  -- 1 tab(s) by mouth once a day  -- Indication: For CHF (congestive heart failure)    ferrous sulfate 325 mg oral delayed release tablet  -- 1 tab(s) by mouth once a day  -- Indication: For Anemia     multivitamin  -- 1 tab(s) by mouth once a day  -- Indication: For supplement

## 2017-08-10 ENCOUNTER — OUTPATIENT (OUTPATIENT)
Dept: OUTPATIENT SERVICES | Facility: HOSPITAL | Age: 82
LOS: 1 days | End: 2017-08-10
Payer: MEDICARE

## 2017-08-10 VITALS
HEIGHT: 57 IN | RESPIRATION RATE: 16 BRPM | SYSTOLIC BLOOD PRESSURE: 132 MMHG | OXYGEN SATURATION: 97 % | DIASTOLIC BLOOD PRESSURE: 70 MMHG | HEART RATE: 55 BPM | TEMPERATURE: 97 F | WEIGHT: 158.07 LBS

## 2017-08-10 DIAGNOSIS — J44.9 CHRONIC OBSTRUCTIVE PULMONARY DISEASE, UNSPECIFIED: ICD-10-CM

## 2017-08-10 DIAGNOSIS — I25.10 ATHEROSCLEROTIC HEART DISEASE OF NATIVE CORONARY ARTERY WITHOUT ANGINA PECTORIS: ICD-10-CM

## 2017-08-10 DIAGNOSIS — I48.91 UNSPECIFIED ATRIAL FIBRILLATION: ICD-10-CM

## 2017-08-10 DIAGNOSIS — I10 ESSENTIAL (PRIMARY) HYPERTENSION: ICD-10-CM

## 2017-08-10 DIAGNOSIS — I50.9 HEART FAILURE, UNSPECIFIED: ICD-10-CM

## 2017-08-10 DIAGNOSIS — Z98.890 OTHER SPECIFIED POSTPROCEDURAL STATES: Chronic | ICD-10-CM

## 2017-08-10 DIAGNOSIS — E11.9 TYPE 2 DIABETES MELLITUS WITHOUT COMPLICATIONS: ICD-10-CM

## 2017-08-10 LAB
BLD GP AB SCN SERPL QL: NEGATIVE — SIGNIFICANT CHANGE UP
BUN SERPL-MCNC: 28 MG/DL — HIGH (ref 7–23)
CALCIUM SERPL-MCNC: 9.8 MG/DL — SIGNIFICANT CHANGE UP (ref 8.4–10.5)
CHLORIDE SERPL-SCNC: 107 MMOL/L — SIGNIFICANT CHANGE UP (ref 98–107)
CO2 SERPL-SCNC: 28 MMOL/L — SIGNIFICANT CHANGE UP (ref 22–31)
CREAT SERPL-MCNC: 1.59 MG/DL — HIGH (ref 0.5–1.3)
GLUCOSE SERPL-MCNC: 69 MG/DL — LOW (ref 70–99)
HBA1C BLD-MCNC: 5.7 % — HIGH (ref 4–5.6)
HCT VFR BLD CALC: 37.7 % — SIGNIFICANT CHANGE UP (ref 34.5–45)
HGB BLD-MCNC: 11.8 G/DL — SIGNIFICANT CHANGE UP (ref 11.5–15.5)
INR BLD: 1.83 — HIGH (ref 0.88–1.17)
MCHC RBC-ENTMCNC: 29.3 PG — SIGNIFICANT CHANGE UP (ref 27–34)
MCHC RBC-ENTMCNC: 31.3 % — LOW (ref 32–36)
MCV RBC AUTO: 93.5 FL — SIGNIFICANT CHANGE UP (ref 80–100)
NRBC # FLD: 0 — SIGNIFICANT CHANGE UP
PLATELET # BLD AUTO: 145 K/UL — LOW (ref 150–400)
PMV BLD: 12.8 FL — SIGNIFICANT CHANGE UP (ref 7–13)
POTASSIUM SERPL-MCNC: 5.3 MMOL/L — SIGNIFICANT CHANGE UP (ref 3.5–5.3)
POTASSIUM SERPL-SCNC: 5.3 MMOL/L — SIGNIFICANT CHANGE UP (ref 3.5–5.3)
PROTHROM AB SERPL-ACNC: 20.7 SEC — HIGH (ref 9.8–13.1)
RBC # BLD: 4.03 M/UL — SIGNIFICANT CHANGE UP (ref 3.8–5.2)
RBC # FLD: 19.3 % — HIGH (ref 10.3–14.5)
RH IG SCN BLD-IMP: POSITIVE — SIGNIFICANT CHANGE UP
SODIUM SERPL-SCNC: 145 MMOL/L — SIGNIFICANT CHANGE UP (ref 135–145)
WBC # BLD: 7.09 K/UL — SIGNIFICANT CHANGE UP (ref 3.8–10.5)
WBC # FLD AUTO: 7.09 K/UL — SIGNIFICANT CHANGE UP (ref 3.8–10.5)

## 2017-08-10 PROCEDURE — 93010 ELECTROCARDIOGRAM REPORT: CPT

## 2017-08-10 RX ORDER — WARFARIN SODIUM 2.5 MG/1
1 TABLET ORAL
Qty: 0 | Refills: 0 | COMMUNITY

## 2017-08-10 RX ORDER — MULTIVIT-MIN/FERROUS GLUCONATE 9 MG/15 ML
1 LIQUID (ML) ORAL
Qty: 0 | Refills: 0 | COMMUNITY

## 2017-08-10 RX ORDER — FERROUS SULFATE 325(65) MG
1 TABLET ORAL
Qty: 0 | Refills: 0 | COMMUNITY

## 2017-08-10 RX ORDER — FLUTICASONE PROPIONATE AND SALMETEROL 50; 250 UG/1; UG/1
1 POWDER ORAL; RESPIRATORY (INHALATION)
Qty: 0 | Refills: 0 | COMMUNITY

## 2017-08-10 RX ORDER — VITAMIN E 100 UNIT
1 CAPSULE ORAL
Qty: 0 | Refills: 0 | COMMUNITY

## 2017-08-10 RX ORDER — METOPROLOL TARTRATE 50 MG
1 TABLET ORAL
Qty: 0 | Refills: 0 | COMMUNITY

## 2017-08-10 RX ORDER — FUROSEMIDE 40 MG
1 TABLET ORAL
Qty: 0 | Refills: 0 | COMMUNITY

## 2017-08-10 RX ORDER — ALBUTEROL 90 UG/1
2 AEROSOL, METERED ORAL
Qty: 0 | Refills: 0 | COMMUNITY

## 2017-08-10 NOTE — H&P PST ADULT - PROBLEM SELECTOR PLAN 1
Pt is scheduled for pulse generator change for 8/15/17. Preop instructions, surgical scrub, and Pepcid provided. Daughter and patient stated understanding. Pt needs reinforcement of information. Daughter takes care of patient at home and will do that. TAHIR precaution, OR booking notified.

## 2017-08-10 NOTE — H&P PST ADULT - PMH
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 Anemia    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

## 2017-08-10 NOTE — H&P PST ADULT - NEGATIVE ENMT SYMPTOMS
no sinus symptoms/no vertigo/no nose bleeds/no throat pain/no post-nasal discharge/no ear pain/no dysphagia/no nasal discharge/no tinnitus/no nasal obstruction/no nasal congestion

## 2017-08-10 NOTE — H&P PST ADULT - MUSCULOSKELETAL
details… detailed exam decreased ROM/no calf tenderness/no joint swelling/no joint warmth/diminished strength/no joint erythema

## 2017-08-10 NOTE — H&P PST ADULT - PROBLEM SELECTOR PLAN 2
Last dose of coumadin 8/13/17 and continue aspirin as per Dr Araujo. Spoke to Mayte. Instructed to take metoprolol on the morning of procedure with a sip of water. Pending last cardiac note with last stress and echo.

## 2017-08-10 NOTE — H&P PST ADULT - FAMILY HISTORY
Sibling  Still living? No  Family history of diabetes mellitus, Age at diagnosis: Age Unknown  Family history of hypertension, Age at diagnosis: Age Unknown

## 2017-08-10 NOTE — H&P PST ADULT - RS GEN PE MLT RESP DETAILS PC
breath sounds equal/respirations non-labored/good air movement/airway patent airway patent/good air movement/no intercostal retractions/clear to auscultation bilaterally/no subcutaneous emphysema/breath sounds equal/no rales/respirations non-labored/no wheezes/no chest wall tenderness/no rhonchi

## 2017-08-10 NOTE — H&P PST ADULT - NEGATIVE GENERAL SYMPTOMS
no anorexia/no weight loss/no weight gain/no sweating/no fever/no polyphagia/no polyuria/no polydipsia/no malaise/no chills

## 2017-08-10 NOTE — H&P PST ADULT - PSH
Aortic Valvar Stenosis  S/P AVR bovine 2005  Model 2700  Serial [id number]  CABG (Coronary Artery Bypass Graft)    Cataract, age-related  b/l surgeries  Hernia, inguinal, left  s/p repair??  History of cardioversion  for rapid A fib   Mitral Valvular Disorder  S/P MVR bovine   Pacemaker    S/P   x 1 Aortic Valvar Stenosis  S/P AVR bovine 2005  Model 2700  Serial [id number]  CABG (Coronary Artery Bypass Graft)    Cataract, age-related  b/l surgeries  Hernia, inguinal, left  s/p repair??  History of cardioversion  for rapid A fib   Mitral Valvular Disorder  S/P MVR bovine   Pacemaker  Guidant 2005  PG Device: insignia I ultra IS-1 Comp, [id number], [id number]  S/P   x 1

## 2017-08-10 NOTE — H&P PST ADULT - NEGATIVE NEUROLOGICAL SYMPTOMS
no paresthesias/no loss of sensation/no vertigo/no tremors/no transient paralysis/no confusion/no weakness/no facial palsy no transient paralysis/no paresthesias/no loss of sensation/no tremors/no facial palsy/no confusion/no vertigo

## 2017-08-10 NOTE — H&P PST ADULT - NSANTHOSAYNRD_GEN_A_CORE
No. TAHIR screening performed.  STOP BANG Legend: 0-2 = LOW Risk; 3-4 = INTERMEDIATE Risk; 5-8 = HIGH Risk

## 2017-08-10 NOTE — H&P PST ADULT - PROBLEM SELECTOR PLAN 7
Aortic Valve Replacement card faxed to OR booking. Pt does not have Mitral valve replacement card. PPM card faxed to OR booking.

## 2017-08-10 NOTE — H&P PST ADULT - GASTROINTESTINAL DETAILS
nontender/no guarding/no masses palpable/bowel sounds normal/no rigidity/soft/no bruit/no rebound tenderness/no organomegaly/no distention

## 2017-08-10 NOTE — H&P PST ADULT - SOURCE OF INFORMATION, PROFILE
chart(s)/patient/daughter lashaun dwyer is a poor historian chart(s)/patient/daughter Alice, pt and daughter are poor historians

## 2017-08-10 NOTE — H&P PST ADULT - FALLEN IN THE PAST
fell and evaluated at [facility] in 5/2017 and also fell out of the chair a couple of days ago, denies injury or LOC/yes

## 2017-08-10 NOTE — H&P PST ADULT - ATTENDING COMMENTS
EP ATTENDING    Patient seen and examined. Agree with above. Ms. Ana Grove is a pleasant 89 year old female with a past medical history of hypertension, paroxysmal atrial fibrillation, and significant coronary artery disease, as well as valvular disease requiring a bioprosthetic aortic valve replacement, bioprosthetic mitral valve replacement, two-vessel coronary artery bypass graft surgery, and postoperative heart block requiring a [place] scientific dual-chamber pacemaker, all at [facility] in December 2005. Her PPM is now at [facility]. Given the above, I recommend an elective dual-chamber pacemaker generator change. I cited a 3% to 6% risk for infection or bleeding, and less than 1% risk of a major complication including but not limited heart attack, stroke, death, or the need for emergency open heart surgery or pericardiocentesis or a lead revision. Understanding her risks, benefits, and alternatives, her and her family have elected a pulse generator change.

## 2017-08-10 NOTE — H&P PST ADULT - NEGATIVE GENERAL GENITOURINARY SYMPTOMS
no hematuria/no renal colic/no flank pain R/no incontinence/no flank pain L/no bladder infections/no dysuria no flank pain L/no hematuria/no flank pain R/no bladder infections/no renal colic/no dysuria

## 2017-08-10 NOTE — H&P PST ADULT - HISTORY OF PRESENT ILLNESS
89 year old female presents to presurgical testing with diagnosis of atherosclerotic heart disease of native coronary artery scheduled for pulse generator change for 8/15/17. Pt with Hx of HTN, HLD, COPD, bioprosthetic AVR and MVP, CABG. Pt has a pacemaker s/p insertion in 2005 for third degree heart block, needs pulse generator change. Has baseline SOB on exertion. Uses a wheelchair, can walk very short distance with assistance and cane. Denies chest pain. Complaining of peripheral edema, furosemide was increased to 40mg BID by PMD. 89 year old female presents to presurgical testing with diagnosis of atherosclerotic heart disease of native coronary artery scheduled for pulse generator change for 8/15/17. Pt with Hx of HTN, HLD, COPD, bioprosthetic AVR and MVR, CABG. Pt has a pacemaker s/p insertion in 2005 for third degree heart block, needs pulse generator change. Has baseline SOB on exertion. Uses a wheelchair, can walk very short distance with assistance and cane. Denies chest pain. Complaining of peripheral edema, furosemide was increased to 40mg BID by PMD.

## 2017-08-10 NOTE — H&P PST ADULT - NEGATIVE OPHTHALMOLOGIC SYMPTOMS
no photophobia/no discharge L/no discharge R/no diplopia/no blurred vision L/no loss of vision L/no pain L/no loss of vision R/no blurred vision R/no pain R

## 2017-08-10 NOTE — H&P PST ADULT - GENERAL GENITOURINARY SYMPTOMS
increased urinary frequency/nocturia incontinence/occasional wears pull ups/increased urinary frequency/nocturia

## 2017-08-23 ENCOUNTER — OUTPATIENT (OUTPATIENT)
Dept: OUTPATIENT SERVICES | Facility: HOSPITAL | Age: 82
LOS: 1 days | Discharge: HOME | End: 2017-08-23
Payer: MEDICARE

## 2017-08-23 DIAGNOSIS — Z98.890 OTHER SPECIFIED POSTPROCEDURAL STATES: Chronic | ICD-10-CM

## 2017-08-23 LAB
INR BLD: 1.94 — HIGH (ref 0.88–1.17)
POTASSIUM SERPL-MCNC: 4.8 MMOL/L — SIGNIFICANT CHANGE UP (ref 3.5–5.3)
POTASSIUM SERPL-SCNC: 4.8 MMOL/L — SIGNIFICANT CHANGE UP (ref 3.5–5.3)
PROTHROM AB SERPL-ACNC: 22 SEC — HIGH (ref 9.8–13.1)

## 2017-08-23 PROCEDURE — 93010 ELECTROCARDIOGRAM REPORT: CPT

## 2017-08-23 RX ORDER — ASCORBIC ACID 60 MG
1 TABLET,CHEWABLE ORAL
Qty: 0 | Refills: 0 | COMMUNITY

## 2017-08-23 RX ORDER — METOPROLOL TARTRATE 50 MG
1 TABLET ORAL
Qty: 0 | Refills: 0 | COMMUNITY

## 2017-08-23 RX ORDER — VITAMIN E 100 UNIT
1 CAPSULE ORAL
Qty: 0 | Refills: 0 | COMMUNITY

## 2017-08-23 RX ORDER — NYSTATIN/TRIAMCINOLONE ACET
1 OINTMENT (GRAM) TOPICAL
Qty: 0 | Refills: 0 | COMMUNITY

## 2017-08-23 RX ORDER — SODIUM CHLORIDE 9 MG/ML
3 INJECTION INTRAMUSCULAR; INTRAVENOUS; SUBCUTANEOUS EVERY 8 HOURS
Qty: 0 | Refills: 0 | Status: DISCONTINUED | OUTPATIENT
Start: 2017-08-23 | End: 2017-09-07

## 2017-08-23 NOTE — CHART NOTE - NSCHARTNOTEFT_GEN_A_CORE
The patient is here today for elective PPM generator change. She is A&O x 2 (place, person), therefore the consent for PPM gen. change was obtained from patient's daughter.

## 2017-08-23 NOTE — PROGRESS NOTE ADULT - SUBJECTIVE AND OBJECTIVE BOX
EP ADDENDUM    Successful PPM gen change  No acute complications    Plan  -four hours bed rest, then d/c home  -oral Abx for 3-5 days  -f/u with me on Thursday September 14th at 11:40 AM      Jonn Araujo MD, [facility]  [phone]

## 2017-11-20 ENCOUNTER — APPOINTMENT (OUTPATIENT)
Dept: CARDIOLOGY | Facility: CLINIC | Age: 82
End: 2017-11-20
Payer: MEDICARE

## 2017-11-20 VITALS
HEIGHT: 59 IN | BODY MASS INDEX: 29.03 KG/M2 | DIASTOLIC BLOOD PRESSURE: 80 MMHG | OXYGEN SATURATION: 96 % | HEART RATE: 73 BPM | SYSTOLIC BLOOD PRESSURE: 156 MMHG | WEIGHT: 144 LBS

## 2017-11-20 VITALS — HEIGHT: 59 IN | BODY MASS INDEX: 29.08 KG/M2

## 2017-11-20 PROCEDURE — 99203 OFFICE O/P NEW LOW 30 MIN: CPT

## 2017-11-20 RX ORDER — WARFARIN 2.5 MG/1
2.5 TABLET ORAL DAILY
Refills: 0 | Status: ACTIVE | COMMUNITY
Start: 2017-11-20

## 2018-01-12 ENCOUNTER — APPOINTMENT (OUTPATIENT)
Dept: CARDIOLOGY | Facility: CLINIC | Age: 83
End: 2018-01-12

## 2018-02-01 ENCOUNTER — OUTPATIENT (OUTPATIENT)
Dept: OUTPATIENT SERVICES | Facility: HOSPITAL | Age: 83
LOS: 1 days | End: 2018-02-01

## 2018-02-01 ENCOUNTER — APPOINTMENT (OUTPATIENT)
Dept: CARDIOLOGY | Facility: CLINIC | Age: 83
End: 2018-02-01
Payer: MEDICARE

## 2018-02-01 ENCOUNTER — NON-APPOINTMENT (OUTPATIENT)
Age: 83
End: 2018-02-01

## 2018-02-01 ENCOUNTER — APPOINTMENT (OUTPATIENT)
Dept: CV DIAGNOSITCS | Facility: HOSPITAL | Age: 83
End: 2018-02-01
Payer: MEDICARE

## 2018-02-01 VITALS
WEIGHT: 131 LBS | BODY MASS INDEX: 26.41 KG/M2 | HEART RATE: 147 BPM | OXYGEN SATURATION: 72 % | HEIGHT: 59 IN | SYSTOLIC BLOOD PRESSURE: 136 MMHG | DIASTOLIC BLOOD PRESSURE: 77 MMHG

## 2018-02-01 VITALS — WEIGHT: 134 LBS | OXYGEN SATURATION: 99 % | HEART RATE: 78 BPM | BODY MASS INDEX: 27.06 KG/M2

## 2018-02-01 VITALS — OXYGEN SATURATION: 72 %

## 2018-02-01 DIAGNOSIS — Z98.890 OTHER SPECIFIED POSTPROCEDURAL STATES: Chronic | ICD-10-CM

## 2018-02-01 DIAGNOSIS — I10 ESSENTIAL (PRIMARY) HYPERTENSION: ICD-10-CM

## 2018-02-01 PROCEDURE — 99214 OFFICE O/P EST MOD 30 MIN: CPT

## 2018-02-01 PROCEDURE — 36415 COLL VENOUS BLD VENIPUNCTURE: CPT

## 2018-02-01 PROCEDURE — 93000 ELECTROCARDIOGRAM COMPLETE: CPT

## 2018-02-01 PROCEDURE — 93306 TTE W/DOPPLER COMPLETE: CPT | Mod: 26

## 2018-02-01 RX ORDER — METOPROLOL SUCCINATE 100 MG/1
100 TABLET, EXTENDED RELEASE ORAL DAILY
Qty: 30 | Refills: 0 | Status: ACTIVE | COMMUNITY
Start: 2018-02-01

## 2018-02-02 ENCOUNTER — OTHER (OUTPATIENT)
Age: 83
End: 2018-02-02

## 2018-02-02 LAB
ANION GAP SERPL CALC-SCNC: 19 MMOL/L
BASOPHILS # BLD AUTO: 0.06 K/UL
BASOPHILS NFR BLD AUTO: 0.6 %
BUN SERPL-MCNC: 56 MG/DL
CALCIUM SERPL-MCNC: 10.4 MG/DL
CALCIUM SERPL-MCNC: 11.1 MG/DL
CHLORIDE SERPL-SCNC: 101 MMOL/L
CO2 SERPL-SCNC: 26 MMOL/L
CREAT SERPL-MCNC: 2.01 MG/DL
EOSINOPHIL # BLD AUTO: 1.07 K/UL
EOSINOPHIL NFR BLD AUTO: 11.3 %
GLUCOSE SERPL-MCNC: 54 MG/DL
HCT VFR BLD CALC: 41.1 %
HGB BLD-MCNC: 12.9 G/DL
IMM GRANULOCYTES NFR BLD AUTO: 0.2 %
LYMPHOCYTES # BLD AUTO: 1.1 K/UL
LYMPHOCYTES NFR BLD AUTO: 11.6 %
MAGNESIUM SERPL-MCNC: 2.4 MG/DL
MAN DIFF?: NORMAL
MCHC RBC-ENTMCNC: 30.5 PG
MCHC RBC-ENTMCNC: 31.4 GM/DL
MCV RBC AUTO: 97.2 FL
MONOCYTES # BLD AUTO: 0.66 K/UL
MONOCYTES NFR BLD AUTO: 7 %
NEUTROPHILS # BLD AUTO: 6.57 K/UL
NEUTROPHILS NFR BLD AUTO: 69.3 %
NT-PROBNP SERPL-MCNC: ABNORMAL PG/ML
PARATHYROID HORMONE INTACT: 95 PG/ML
PLATELET # BLD AUTO: 164 K/UL
POTASSIUM SERPL-SCNC: 5.9 MMOL/L
RBC # BLD: 4.23 M/UL
RBC # FLD: 17.4 %
SODIUM SERPL-SCNC: 146 MMOL/L
WBC # FLD AUTO: 9.48 K/UL

## 2018-02-02 RX ORDER — EZETIMIBE 10 MG/1
10 TABLET ORAL DAILY
Refills: 0 | Status: ACTIVE | COMMUNITY
Start: 2018-02-02

## 2018-02-02 RX ORDER — METOPROLOL TARTRATE 50 MG/1
50 TABLET, FILM COATED ORAL DAILY
Qty: 90 | Refills: 0 | Status: DISCONTINUED | COMMUNITY
Start: 2017-06-06 | End: 2018-02-02

## 2018-02-02 RX ORDER — METOPROLOL SUCCINATE 50 MG/1
50 TABLET, EXTENDED RELEASE ORAL DAILY
Qty: 30 | Refills: 3 | Status: ACTIVE | COMMUNITY
Start: 2018-02-02

## 2018-02-07 ENCOUNTER — LABORATORY RESULT (OUTPATIENT)
Age: 83
End: 2018-02-07

## 2018-02-08 ENCOUNTER — RESULT REVIEW (OUTPATIENT)
Age: 83
End: 2018-02-08

## 2018-02-08 LAB — NT-PROBNP SERPL-MCNC: 9420 PG/ML

## 2018-02-09 LAB — PTH RELATED PROT SERPL-MCNC: <1.1 PMOL/L

## 2018-03-23 ENCOUNTER — APPOINTMENT (OUTPATIENT)
Dept: CARDIOLOGY | Facility: CLINIC | Age: 83
End: 2018-03-23
Payer: MEDICARE

## 2018-03-23 VITALS
RESPIRATION RATE: 14 BRPM | BODY MASS INDEX: 27.01 KG/M2 | DIASTOLIC BLOOD PRESSURE: 77 MMHG | WEIGHT: 134 LBS | HEART RATE: 60 BPM | OXYGEN SATURATION: 98 % | HEIGHT: 59 IN | SYSTOLIC BLOOD PRESSURE: 164 MMHG

## 2018-03-23 VITALS — BODY MASS INDEX: 27.47 KG/M2 | WEIGHT: 136 LBS

## 2018-03-23 DIAGNOSIS — I50.32 CHRONIC DIASTOLIC (CONGESTIVE) HEART FAILURE: ICD-10-CM

## 2018-03-23 DIAGNOSIS — I10 ESSENTIAL (PRIMARY) HYPERTENSION: ICD-10-CM

## 2018-03-23 DIAGNOSIS — I48.0 PAROXYSMAL ATRIAL FIBRILLATION: ICD-10-CM

## 2018-03-23 PROCEDURE — 99215 OFFICE O/P EST HI 40 MIN: CPT

## 2018-03-23 RX ORDER — WARFARIN 3 MG/1
3 TABLET ORAL
Qty: 30 | Refills: 0 | Status: ACTIVE | COMMUNITY
Start: 2017-12-21

## 2018-03-23 RX ORDER — BUMETANIDE 1 MG/1
1 TABLET ORAL
Qty: 90 | Refills: 3 | Status: ACTIVE | COMMUNITY
Start: 2017-11-20 | End: 1900-01-01

## 2018-05-04 ENCOUNTER — INPATIENT (INPATIENT)
Facility: HOSPITAL | Age: 83
LOS: 8 days | End: 2018-05-13
Attending: HOSPITALIST | Admitting: HOSPITALIST
Payer: MEDICARE

## 2018-05-04 VITALS
RESPIRATION RATE: 18 BRPM | HEART RATE: 59 BPM | SYSTOLIC BLOOD PRESSURE: 105 MMHG | DIASTOLIC BLOOD PRESSURE: 56 MMHG | OXYGEN SATURATION: 100 % | TEMPERATURE: 98 F

## 2018-05-04 DIAGNOSIS — Z98.890 OTHER SPECIFIED POSTPROCEDURAL STATES: Chronic | ICD-10-CM

## 2018-05-04 DIAGNOSIS — E87.5 HYPERKALEMIA: ICD-10-CM

## 2018-05-04 LAB
ALBUMIN SERPL ELPH-MCNC: 4.3 G/DL — SIGNIFICANT CHANGE UP (ref 3.3–5)
ALP SERPL-CCNC: 209 U/L — HIGH (ref 40–120)
ALT FLD-CCNC: 62 U/L — HIGH (ref 4–33)
APPEARANCE UR: CLEAR — SIGNIFICANT CHANGE UP
AST SERPL-CCNC: 77 U/L — HIGH (ref 4–32)
BASE EXCESS BLDV CALC-SCNC: -2.4 MMOL/L — SIGNIFICANT CHANGE UP
BASE EXCESS BLDV CALC-SCNC: -3.6 MMOL/L — SIGNIFICANT CHANGE UP
BASOPHILS # BLD AUTO: 0.02 K/UL — SIGNIFICANT CHANGE UP (ref 0–0.2)
BASOPHILS NFR BLD AUTO: 0.2 % — SIGNIFICANT CHANGE UP (ref 0–2)
BILIRUB SERPL-MCNC: 1.8 MG/DL — HIGH (ref 0.2–1.2)
BILIRUB UR-MCNC: NEGATIVE — SIGNIFICANT CHANGE UP
BLOOD GAS VENOUS - CREATININE: 4.31 MG/DL — HIGH (ref 0.5–1.3)
BLOOD GAS VENOUS - CREATININE: 4.41 MG/DL — HIGH (ref 0.5–1.3)
BLOOD UR QL VISUAL: NEGATIVE — SIGNIFICANT CHANGE UP
BUN SERPL-MCNC: 121 MG/DL — HIGH (ref 7–23)
CALCIUM SERPL-MCNC: 9.7 MG/DL — SIGNIFICANT CHANGE UP (ref 8.4–10.5)
CHLORIDE BLDV-SCNC: 100 MMOL/L — SIGNIFICANT CHANGE UP (ref 96–108)
CHLORIDE BLDV-SCNC: 102 MMOL/L — SIGNIFICANT CHANGE UP (ref 96–108)
CHLORIDE SERPL-SCNC: 96 MMOL/L — LOW (ref 98–107)
CO2 SERPL-SCNC: 20 MMOL/L — LOW (ref 22–31)
COLOR SPEC: YELLOW — SIGNIFICANT CHANGE UP
CREAT SERPL-MCNC: 4.37 MG/DL — HIGH (ref 0.5–1.3)
EOSINOPHIL # BLD AUTO: 0 K/UL — SIGNIFICANT CHANGE UP (ref 0–0.5)
EOSINOPHIL NFR BLD AUTO: 0 % — SIGNIFICANT CHANGE UP (ref 0–6)
GAS PNL BLDV: 133 MMOL/L — LOW (ref 136–146)
GAS PNL BLDV: 136 MMOL/L — SIGNIFICANT CHANGE UP (ref 136–146)
GLUCOSE BLDV-MCNC: 107 — HIGH (ref 70–99)
GLUCOSE BLDV-MCNC: 186 — HIGH (ref 70–99)
GLUCOSE SERPL-MCNC: 117 MG/DL — HIGH (ref 70–99)
GLUCOSE UR-MCNC: NEGATIVE — SIGNIFICANT CHANGE UP
HCO3 BLDV-SCNC: 20 MMOL/L — SIGNIFICANT CHANGE UP (ref 20–27)
HCO3 BLDV-SCNC: 21 MMOL/L — SIGNIFICANT CHANGE UP (ref 20–27)
HCT VFR BLD CALC: 37.7 % — SIGNIFICANT CHANGE UP (ref 34.5–45)
HCT VFR BLDV CALC: 38.6 % — SIGNIFICANT CHANGE UP (ref 34.5–45)
HCT VFR BLDV CALC: 38.6 % — SIGNIFICANT CHANGE UP (ref 34.5–45)
HGB BLD-MCNC: 12 G/DL — SIGNIFICANT CHANGE UP (ref 11.5–15.5)
HGB BLDV-MCNC: 12.5 G/DL — SIGNIFICANT CHANGE UP (ref 11.5–15.5)
HGB BLDV-MCNC: 12.5 G/DL — SIGNIFICANT CHANGE UP (ref 11.5–15.5)
HYALINE CASTS # UR AUTO: SIGNIFICANT CHANGE UP (ref 0–?)
IMM GRANULOCYTES # BLD AUTO: 0.05 # — SIGNIFICANT CHANGE UP
IMM GRANULOCYTES NFR BLD AUTO: 0.5 % — SIGNIFICANT CHANGE UP (ref 0–1.5)
KETONES UR-MCNC: NEGATIVE — SIGNIFICANT CHANGE UP
LACTATE BLDV-MCNC: 3.7 MMOL/L — HIGH (ref 0.5–2)
LACTATE BLDV-MCNC: 3.9 MMOL/L — HIGH (ref 0.5–2)
LEUKOCYTE ESTERASE UR-ACNC: SIGNIFICANT CHANGE UP
LYMPHOCYTES # BLD AUTO: 0.97 K/UL — LOW (ref 1–3.3)
LYMPHOCYTES # BLD AUTO: 9.2 % — LOW (ref 13–44)
MCHC RBC-ENTMCNC: 31.8 % — LOW (ref 32–36)
MCHC RBC-ENTMCNC: 32.1 PG — SIGNIFICANT CHANGE UP (ref 27–34)
MCV RBC AUTO: 100.8 FL — HIGH (ref 80–100)
MONOCYTES # BLD AUTO: 1.14 K/UL — HIGH (ref 0–0.9)
MONOCYTES NFR BLD AUTO: 10.8 % — SIGNIFICANT CHANGE UP (ref 2–14)
MUCOUS THREADS # UR AUTO: SIGNIFICANT CHANGE UP
NEUTROPHILS # BLD AUTO: 8.37 K/UL — HIGH (ref 1.8–7.4)
NEUTROPHILS NFR BLD AUTO: 79.3 % — HIGH (ref 43–77)
NITRITE UR-MCNC: NEGATIVE — SIGNIFICANT CHANGE UP
NRBC # FLD: 1.46 — SIGNIFICANT CHANGE UP
NRBC FLD-RTO: 13.8 — SIGNIFICANT CHANGE UP
PCO2 BLDV: 40 MMHG — LOW (ref 41–51)
PCO2 BLDV: 43 MMHG — SIGNIFICANT CHANGE UP (ref 41–51)
PH BLDV: 7.34 PH — SIGNIFICANT CHANGE UP (ref 7.32–7.43)
PH BLDV: 7.34 PH — SIGNIFICANT CHANGE UP (ref 7.32–7.43)
PH UR: 5.5 — SIGNIFICANT CHANGE UP (ref 4.6–8)
PLATELET # BLD AUTO: 126 K/UL — LOW (ref 150–400)
PMV BLD: 13.9 FL — HIGH (ref 7–13)
PO2 BLDV: < 24 MMHG — LOW (ref 35–40)
PO2 BLDV: < 24 MMHG — LOW (ref 35–40)
POTASSIUM BLDV-SCNC: 5.6 MMOL/L — HIGH (ref 3.4–4.5)
POTASSIUM BLDV-SCNC: SIGNIFICANT CHANGE UP MMOL/L (ref 3.4–4.5)
POTASSIUM SERPL-MCNC: 6.4 MMOL/L — CRITICAL HIGH (ref 3.5–5.3)
POTASSIUM SERPL-SCNC: 6.4 MMOL/L — CRITICAL HIGH (ref 3.5–5.3)
PROT SERPL-MCNC: 7.9 G/DL — SIGNIFICANT CHANGE UP (ref 6–8.3)
PROT UR-MCNC: 30 MG/DL — HIGH
RBC # BLD: 3.74 M/UL — LOW (ref 3.8–5.2)
RBC # FLD: 20.8 % — HIGH (ref 10.3–14.5)
RBC CASTS # UR COMP ASSIST: SIGNIFICANT CHANGE UP (ref 0–?)
SAO2 % BLDV: 17.2 % — LOW (ref 60–85)
SAO2 % BLDV: 19 % — LOW (ref 60–85)
SODIUM SERPL-SCNC: 138 MMOL/L — SIGNIFICANT CHANGE UP (ref 135–145)
SP GR SPEC: 1.01 — SIGNIFICANT CHANGE UP (ref 1–1.04)
SQUAMOUS # UR AUTO: SIGNIFICANT CHANGE UP
UROBILINOGEN FLD QL: NORMAL MG/DL — SIGNIFICANT CHANGE UP
WBC # BLD: 10.55 K/UL — HIGH (ref 3.8–10.5)
WBC # FLD AUTO: 10.55 K/UL — HIGH (ref 3.8–10.5)
WBC UR QL: SIGNIFICANT CHANGE UP (ref 0–?)

## 2018-05-04 PROCEDURE — 71046 X-RAY EXAM CHEST 2 VIEWS: CPT | Mod: 26

## 2018-05-04 RX ORDER — ALBUTEROL 90 UG/1
10 AEROSOL, METERED ORAL ONCE
Qty: 0 | Refills: 0 | Status: DISCONTINUED | OUTPATIENT
Start: 2018-05-04 | End: 2018-05-04

## 2018-05-04 RX ORDER — SODIUM POLYSTYRENE SULFONATE 4.1 MEQ/G
15 POWDER, FOR SUSPENSION ORAL ONCE
Qty: 0 | Refills: 0 | Status: COMPLETED | OUTPATIENT
Start: 2018-05-04 | End: 2018-05-04

## 2018-05-04 RX ORDER — DEXTROSE 50 % IN WATER 50 %
50 SYRINGE (ML) INTRAVENOUS ONCE
Qty: 0 | Refills: 0 | Status: COMPLETED | OUTPATIENT
Start: 2018-05-04 | End: 2018-05-04

## 2018-05-04 RX ORDER — ALBUTEROL 90 UG/1
10 AEROSOL, METERED ORAL ONCE
Qty: 0 | Refills: 0 | Status: COMPLETED | OUTPATIENT
Start: 2018-05-04 | End: 2018-05-04

## 2018-05-04 RX ORDER — ONDANSETRON 8 MG/1
4 TABLET, FILM COATED ORAL ONCE
Qty: 0 | Refills: 0 | Status: COMPLETED | OUTPATIENT
Start: 2018-05-04 | End: 2018-05-04

## 2018-05-04 RX ORDER — CALCIUM GLUCONATE 100 MG/ML
2 VIAL (ML) INTRAVENOUS ONCE
Qty: 0 | Refills: 0 | Status: COMPLETED | OUTPATIENT
Start: 2018-05-04 | End: 2018-05-04

## 2018-05-04 RX ORDER — INSULIN HUMAN 100 [IU]/ML
5 INJECTION, SOLUTION SUBCUTANEOUS ONCE
Qty: 0 | Refills: 0 | Status: COMPLETED | OUTPATIENT
Start: 2018-05-04 | End: 2018-05-04

## 2018-05-04 RX ORDER — SODIUM BICARBONATE 1 MEQ/ML
50 SYRINGE (ML) INTRAVENOUS ONCE
Qty: 0 | Refills: 0 | Status: COMPLETED | OUTPATIENT
Start: 2018-05-04 | End: 2018-05-04

## 2018-05-04 RX ADMIN — ALBUTEROL 10 MILLIGRAM(S): 90 AEROSOL, METERED ORAL at 22:03

## 2018-05-04 RX ADMIN — Medication 200 GRAM(S): at 21:59

## 2018-05-04 RX ADMIN — INSULIN HUMAN 5 UNIT(S): 100 INJECTION, SOLUTION SUBCUTANEOUS at 21:59

## 2018-05-04 RX ADMIN — ONDANSETRON 4 MILLIGRAM(S): 8 TABLET, FILM COATED ORAL at 22:03

## 2018-05-04 RX ADMIN — Medication 50 MILLIEQUIVALENT(S): at 21:59

## 2018-05-04 RX ADMIN — Medication 50 MILLILITER(S): at 21:59

## 2018-05-04 NOTE — ED PROVIDER NOTE - PMH
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

## 2018-05-04 NOTE — ED PROVIDER NOTE - PSH
Aortic Valvar Stenosis  S/P AVR bovine 2005  Model 2700  Serial [id number]  CABG (Coronary Artery Bypass Graft)    Cataract, age-related  b/l surgeries  Hernia, inguinal, left  s/p repair??  History of cardioversion  for rapid A fib   Mitral Valvular Disorder  S/P MVR bovine   Pacemaker  Guidant 2005  PG Device: insignia I ultra IS-1 Comp, [id number], [id number]  S/P   x 1

## 2018-05-04 NOTE — ED ADULT TRIAGE NOTE - CHIEF COMPLAINT QUOTE
pt sent to er for elevated potassium,  decreased kidney function. as per daughter pt with decreased appetite and weakness x 2 weeks. pmhx  htn, dm, anemia, afib, chf, ckd, cva, pacemaker

## 2018-05-04 NOTE — ED PROVIDER NOTE - MEDICAL DECISION MAKING DETAILS
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 Pt with generalized weakness, nausea ,decreased PO intake.   Plan: rpt cbc, cmp, vbg, ua, cxr, ekg.

## 2018-05-04 NOTE — ED PROVIDER NOTE - ATTENDING CONTRIBUTION TO CARE
I performed a face to face history and physical exam of the patient and discussed their management with the PA. I reviewed the PA's note and agree with the documented findings and plan of care. 89 y/o female with multiple medical problems including kidney disease p/w 1 week hx of decreased appetite, increasing weakness, SOB, unable to walk many steps without getting SOB, no fever, no CP, no abdominal pain, no N/V, increase swelling in lower ext, pt found to be hyperkalemic at PMD's office, Lungs CTA, b/l lower ext 2+ pitting edema 1)EKG 2)CBC, CMP, CE, Troponin, VBG 3)CXR 4)reevaluate

## 2018-05-04 NOTE — ED ADULT NURSE NOTE - OBJECTIVE STATEMENT
pt comes to room 11 with daughter at side vitals above saline lock ledt forearm 22 g angio labs sent pt has been feeling weak past couple of weeks usually walk s with cane but lately she has needed to be pushed around on her wheelie walker at home  pt alert cooperative  will continue to monitor

## 2018-05-04 NOTE — ED PROVIDER NOTE - OBJECTIVE STATEMENT
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 Pt went to see her PMD Dr. Jason Renner on Wednesday, called today told to come to the ED because potassium was elevated, unsure of lab value. ROS + for nausea and some vomiting with certain food she tries to eat this week. Denies headache, cp, sob, abdominal pain, cough, uri symptoms, urinary symptoms, diarrhea.

## 2018-05-05 DIAGNOSIS — R74.0 NONSPECIFIC ELEVATION OF LEVELS OF TRANSAMINASE AND LACTIC ACID DEHYDROGENASE [LDH]: ICD-10-CM

## 2018-05-05 DIAGNOSIS — R79.89 OTHER SPECIFIED ABNORMAL FINDINGS OF BLOOD CHEMISTRY: ICD-10-CM

## 2018-05-05 DIAGNOSIS — D69.6 THROMBOCYTOPENIA, UNSPECIFIED: ICD-10-CM

## 2018-05-05 DIAGNOSIS — E78.5 HYPERLIPIDEMIA, UNSPECIFIED: ICD-10-CM

## 2018-05-05 DIAGNOSIS — J44.9 CHRONIC OBSTRUCTIVE PULMONARY DISEASE, UNSPECIFIED: ICD-10-CM

## 2018-05-05 DIAGNOSIS — I25.10 ATHEROSCLEROTIC HEART DISEASE OF NATIVE CORONARY ARTERY WITHOUT ANGINA PECTORIS: ICD-10-CM

## 2018-05-05 DIAGNOSIS — E11.9 TYPE 2 DIABETES MELLITUS WITHOUT COMPLICATIONS: ICD-10-CM

## 2018-05-05 DIAGNOSIS — I48.91 UNSPECIFIED ATRIAL FIBRILLATION: ICD-10-CM

## 2018-05-05 DIAGNOSIS — N17.9 ACUTE KIDNEY FAILURE, UNSPECIFIED: ICD-10-CM

## 2018-05-05 DIAGNOSIS — I10 ESSENTIAL (PRIMARY) HYPERTENSION: ICD-10-CM

## 2018-05-05 DIAGNOSIS — Z29.9 ENCOUNTER FOR PROPHYLACTIC MEASURES, UNSPECIFIED: ICD-10-CM

## 2018-05-05 DIAGNOSIS — E87.5 HYPERKALEMIA: ICD-10-CM

## 2018-05-05 LAB
ALBUMIN SERPL ELPH-MCNC: 3.4 G/DL — SIGNIFICANT CHANGE UP (ref 3.3–5)
ALBUMIN SERPL ELPH-MCNC: 3.6 G/DL — SIGNIFICANT CHANGE UP (ref 3.3–5)
ALP SERPL-CCNC: 169 U/L — HIGH (ref 40–120)
ALP SERPL-CCNC: 183 U/L — HIGH (ref 40–120)
ALT FLD-CCNC: 53 U/L — HIGH (ref 4–33)
ALT FLD-CCNC: 55 U/L — HIGH (ref 4–33)
APPEARANCE UR: SIGNIFICANT CHANGE UP
APTT BLD: 38.2 SEC — HIGH (ref 27.5–37.4)
AST SERPL-CCNC: 68 U/L — HIGH (ref 4–32)
AST SERPL-CCNC: 71 U/L — HIGH (ref 4–32)
BASOPHILS # BLD AUTO: 0.02 K/UL — SIGNIFICANT CHANGE UP (ref 0–0.2)
BASOPHILS NFR BLD AUTO: 0.2 % — SIGNIFICANT CHANGE UP (ref 0–2)
BILIRUB SERPL-MCNC: 1.7 MG/DL — HIGH (ref 0.2–1.2)
BILIRUB SERPL-MCNC: 1.8 MG/DL — HIGH (ref 0.2–1.2)
BILIRUB UR-MCNC: NEGATIVE — SIGNIFICANT CHANGE UP
BLOOD UR QL VISUAL: HIGH
BUN SERPL-MCNC: 118 MG/DL — HIGH (ref 7–23)
BUN SERPL-MCNC: 119 MG/DL — HIGH (ref 7–23)
CALCIUM SERPL-MCNC: 9.2 MG/DL — SIGNIFICANT CHANGE UP (ref 8.4–10.5)
CALCIUM SERPL-MCNC: 9.7 MG/DL — SIGNIFICANT CHANGE UP (ref 8.4–10.5)
CHLORIDE SERPL-SCNC: 95 MMOL/L — LOW (ref 98–107)
CHLORIDE SERPL-SCNC: 96 MMOL/L — LOW (ref 98–107)
CHLORIDE UR-SCNC: < 20 MMOL/L — SIGNIFICANT CHANGE UP
CHOLEST SERPL-MCNC: 113 MG/DL — LOW (ref 120–199)
CO2 SERPL-SCNC: 21 MMOL/L — LOW (ref 22–31)
CO2 SERPL-SCNC: 21 MMOL/L — LOW (ref 22–31)
COLOR SPEC: YELLOW — SIGNIFICANT CHANGE UP
CREAT ?TM UR-MCNC: 140.18 MG/DL — SIGNIFICANT CHANGE UP
CREAT SERPL-MCNC: 4.32 MG/DL — HIGH (ref 0.5–1.3)
CREAT SERPL-MCNC: 4.43 MG/DL — HIGH (ref 0.5–1.3)
D DIMER BLD IA.RAPID-MCNC: 1045 NG/ML — SIGNIFICANT CHANGE UP
D DIMER BLD IA.RAPID-MCNC: 989 NG/ML — SIGNIFICANT CHANGE UP
EOSINOPHIL # BLD AUTO: 0.08 K/UL — SIGNIFICANT CHANGE UP (ref 0–0.5)
EOSINOPHIL NFR BLD AUTO: 0.7 % — SIGNIFICANT CHANGE UP (ref 0–6)
EOSINOPHIL NFR URNS MANUAL: SIGNIFICANT CHANGE UP (ref 0–0)
FIBRINOGEN PPP-MCNC: 357.8 MG/DL — SIGNIFICANT CHANGE UP (ref 310–510)
GLUCOSE SERPL-MCNC: 121 MG/DL — HIGH (ref 70–99)
GLUCOSE SERPL-MCNC: 136 MG/DL — HIGH (ref 70–99)
GLUCOSE UR-MCNC: NEGATIVE — SIGNIFICANT CHANGE UP
HAPTOGLOB SERPL-MCNC: 6 MG/DL — LOW (ref 34–200)
HBA1C BLD-MCNC: 5.6 % — SIGNIFICANT CHANGE UP (ref 4–5.6)
HCT VFR BLD CALC: 33 % — LOW (ref 34.5–45)
HCT VFR BLD CALC: 33.1 % — LOW (ref 34.5–45)
HDLC SERPL-MCNC: 28 MG/DL — LOW (ref 45–65)
HGB BLD-MCNC: 10.5 G/DL — LOW (ref 11.5–15.5)
HGB BLD-MCNC: 10.7 G/DL — LOW (ref 11.5–15.5)
HYALINE CASTS # UR AUTO: SIGNIFICANT CHANGE UP (ref 0–?)
IGA FLD-MCNC: 369 MG/DL — SIGNIFICANT CHANGE UP (ref 70–400)
IGG FLD-MCNC: 1194 MG/DL — SIGNIFICANT CHANGE UP (ref 700–1600)
IGM SERPL-MCNC: 54 MG/DL — SIGNIFICANT CHANGE UP (ref 40–230)
IMM GRANULOCYTES # BLD AUTO: 0.06 # — SIGNIFICANT CHANGE UP
IMM GRANULOCYTES NFR BLD AUTO: 0.5 % — SIGNIFICANT CHANGE UP (ref 0–1.5)
INR BLD: 4.28 — HIGH (ref 0.88–1.17)
INR BLD: 4.94 — CRITICAL HIGH (ref 0.88–1.17)
INR BLD: 5.2 — CRITICAL HIGH (ref 0.88–1.17)
IRON SATN MFR SERPL: 268 UG/DL — SIGNIFICANT CHANGE UP (ref 140–530)
IRON SATN MFR SERPL: 73 UG/DL — SIGNIFICANT CHANGE UP (ref 30–160)
KETONES UR-MCNC: NEGATIVE — SIGNIFICANT CHANGE UP
LACTATE SERPL-SCNC: 2.4 MMOL/L — HIGH (ref 0.5–2)
LDH SERPL L TO P-CCNC: 753 U/L — HIGH (ref 135–225)
LEUKOCYTE ESTERASE UR-ACNC: HIGH
LIPID PNL WITH DIRECT LDL SERPL: 75 MG/DL — SIGNIFICANT CHANGE UP
LYMPHOCYTES # BLD AUTO: 1.19 K/UL — SIGNIFICANT CHANGE UP (ref 1–3.3)
LYMPHOCYTES # BLD AUTO: 10.7 % — LOW (ref 13–44)
MAGNESIUM SERPL-MCNC: 3 MG/DL — HIGH (ref 1.6–2.6)
MCHC RBC-ENTMCNC: 31.7 % — LOW (ref 32–36)
MCHC RBC-ENTMCNC: 31.9 PG — SIGNIFICANT CHANGE UP (ref 27–34)
MCHC RBC-ENTMCNC: 32.2 PG — SIGNIFICANT CHANGE UP (ref 27–34)
MCHC RBC-ENTMCNC: 32.4 % — SIGNIFICANT CHANGE UP (ref 32–36)
MCV RBC AUTO: 100.6 FL — HIGH (ref 80–100)
MCV RBC AUTO: 99.4 FL — SIGNIFICANT CHANGE UP (ref 80–100)
MONOCYTES # BLD AUTO: 1.46 K/UL — HIGH (ref 0–0.9)
MONOCYTES NFR BLD AUTO: 13.1 % — SIGNIFICANT CHANGE UP (ref 2–14)
NEUTROPHILS # BLD AUTO: 8.35 K/UL — HIGH (ref 1.8–7.4)
NEUTROPHILS NFR BLD AUTO: 74.8 % — SIGNIFICANT CHANGE UP (ref 43–77)
NITRITE UR-MCNC: NEGATIVE — SIGNIFICANT CHANGE UP
NRBC # FLD: 1.1 — SIGNIFICANT CHANGE UP
NRBC # FLD: 1.31 — SIGNIFICANT CHANGE UP
NRBC FLD-RTO: 10.2 — SIGNIFICANT CHANGE UP
NRBC FLD-RTO: 11.7 — SIGNIFICANT CHANGE UP
OSMOLALITY UR: 399 MOSMO/KG — SIGNIFICANT CHANGE UP (ref 50–1200)
PH UR: 5.5 — SIGNIFICANT CHANGE UP (ref 4.6–8)
PLATELET # BLD AUTO: 80 K/UL — LOW (ref 150–400)
PLATELET # BLD AUTO: 89 K/UL — LOW (ref 150–400)
PMV BLD: 12.3 FL — SIGNIFICANT CHANGE UP (ref 7–13)
PMV BLD: 12.4 FL — SIGNIFICANT CHANGE UP (ref 7–13)
POTASSIUM SERPL-MCNC: 5.2 MMOL/L — SIGNIFICANT CHANGE UP (ref 3.5–5.3)
POTASSIUM SERPL-MCNC: 5.3 MMOL/L — SIGNIFICANT CHANGE UP (ref 3.5–5.3)
POTASSIUM SERPL-SCNC: 5.2 MMOL/L — SIGNIFICANT CHANGE UP (ref 3.5–5.3)
POTASSIUM SERPL-SCNC: 5.3 MMOL/L — SIGNIFICANT CHANGE UP (ref 3.5–5.3)
POTASSIUM UR-SCNC: 63.2 MMOL/L — SIGNIFICANT CHANGE UP
PROT SERPL-MCNC: 6.7 G/DL — SIGNIFICANT CHANGE UP (ref 6–8.3)
PROT SERPL-MCNC: 6.8 G/DL — SIGNIFICANT CHANGE UP (ref 6–8.3)
PROT UR-MCNC: 100 MG/DL — HIGH
PROT UR-MCNC: 108.8 MG/DL — SIGNIFICANT CHANGE UP
PROTHROM AB SERPL-ACNC: 50.7 SEC — HIGH (ref 9.8–13.1)
PROTHROM AB SERPL-ACNC: 58.7 SEC — HIGH (ref 9.8–13.1)
PROTHROM AB SERPL-ACNC: 59.7 SEC — HIGH (ref 9.8–13.1)
RBC # BLD: 3.29 M/UL — LOW (ref 3.8–5.2)
RBC # BLD: 3.32 M/UL — LOW (ref 3.8–5.2)
RBC # FLD: 21.1 % — HIGH (ref 10.3–14.5)
RBC # FLD: 21.2 % — HIGH (ref 10.3–14.5)
RBC CASTS # UR COMP ASSIST: >50 — HIGH (ref 0–?)
RETICS #: 265 K/UL — HIGH (ref 25–125)
RETICS/RBC NFR: 8.1 % — HIGH (ref 0.5–2.5)
SODIUM SERPL-SCNC: 137 MMOL/L — SIGNIFICANT CHANGE UP (ref 135–145)
SODIUM SERPL-SCNC: 138 MMOL/L — SIGNIFICANT CHANGE UP (ref 135–145)
SODIUM UR-SCNC: 22 MMOL/L — SIGNIFICANT CHANGE UP
SP GR SPEC: 1.02 — SIGNIFICANT CHANGE UP (ref 1–1.04)
SQUAMOUS # UR AUTO: SIGNIFICANT CHANGE UP
TRIGL SERPL-MCNC: 88 MG/DL — SIGNIFICANT CHANGE UP (ref 10–149)
UIBC SERPL-MCNC: 194.6 UG/DL — SIGNIFICANT CHANGE UP (ref 110–370)
URATE UR-MCNC: 11.6 MG/DL — SIGNIFICANT CHANGE UP
UROBILINOGEN FLD QL: NORMAL MG/DL — SIGNIFICANT CHANGE UP
UUN UR-MCNC: 582.8 MG/DL — SIGNIFICANT CHANGE UP
WBC # BLD: 10.78 K/UL — HIGH (ref 3.8–10.5)
WBC # BLD: 11.16 K/UL — HIGH (ref 3.8–10.5)
WBC # FLD AUTO: 10.78 K/UL — HIGH (ref 3.8–10.5)
WBC # FLD AUTO: 11.16 K/UL — HIGH (ref 3.8–10.5)
WBC CLUMPS #/AREA URNS HPF: PRESENT — HIGH (ref 0–?)
WBC UR QL: >50 — HIGH (ref 0–?)

## 2018-05-05 PROCEDURE — 99223 1ST HOSP IP/OBS HIGH 75: CPT

## 2018-05-05 PROCEDURE — 12345: CPT | Mod: NC

## 2018-05-05 PROCEDURE — 99222 1ST HOSP IP/OBS MODERATE 55: CPT | Mod: GC

## 2018-05-05 PROCEDURE — 99223 1ST HOSP IP/OBS HIGH 75: CPT | Mod: GC

## 2018-05-05 RX ORDER — FERROUS SULFATE 325(65) MG
325 TABLET ORAL DAILY
Qty: 0 | Refills: 0 | Status: DISCONTINUED | OUTPATIENT
Start: 2018-05-05 | End: 2018-05-11

## 2018-05-05 RX ORDER — FLUTICASONE PROPIONATE AND SALMETEROL 50; 250 UG/1; UG/1
1 POWDER ORAL; RESPIRATORY (INHALATION)
Qty: 0 | Refills: 0 | COMMUNITY

## 2018-05-05 RX ORDER — BUMETANIDE 0.25 MG/ML
1 INJECTION INTRAMUSCULAR; INTRAVENOUS
Qty: 0 | Refills: 0 | COMMUNITY

## 2018-05-05 RX ORDER — ASPIRIN/CALCIUM CARB/MAGNESIUM 324 MG
1 TABLET ORAL
Qty: 0 | Refills: 0 | COMMUNITY

## 2018-05-05 RX ORDER — NYSTATIN CREAM 100000 [USP'U]/G
1 CREAM TOPICAL
Qty: 0 | Refills: 0 | Status: DISCONTINUED | OUTPATIENT
Start: 2018-05-05 | End: 2018-05-13

## 2018-05-05 RX ORDER — POTASSIUM CHLORIDE 20 MEQ
1 PACKET (EA) ORAL
Qty: 0 | Refills: 0 | COMMUNITY

## 2018-05-05 RX ORDER — BUDESONIDE AND FORMOTEROL FUMARATE DIHYDRATE 160; 4.5 UG/1; UG/1
2 AEROSOL RESPIRATORY (INHALATION)
Qty: 0 | Refills: 0 | Status: DISCONTINUED | OUTPATIENT
Start: 2018-05-05 | End: 2018-05-13

## 2018-05-05 RX ORDER — IPRATROPIUM BROMIDE 0.2 MG/ML
0 SOLUTION, NON-ORAL INHALATION
Qty: 0 | Refills: 0 | COMMUNITY

## 2018-05-05 RX ORDER — FERROUS SULFATE 325(65) MG
1 TABLET ORAL
Qty: 0 | Refills: 0 | COMMUNITY

## 2018-05-05 RX ORDER — IPRATROPIUM/ALBUTEROL SULFATE 18-103MCG
3 AEROSOL WITH ADAPTER (GRAM) INHALATION EVERY 4 HOURS
Qty: 0 | Refills: 0 | Status: DISCONTINUED | OUTPATIENT
Start: 2018-05-05 | End: 2018-05-13

## 2018-05-05 RX ORDER — EZETIMIBE 10 MG/1
1 TABLET ORAL
Qty: 0 | Refills: 0 | COMMUNITY

## 2018-05-05 RX ORDER — DOCUSATE SODIUM 100 MG
100 CAPSULE ORAL DAILY
Qty: 0 | Refills: 0 | Status: DISCONTINUED | OUTPATIENT
Start: 2018-05-05 | End: 2018-05-06

## 2018-05-05 RX ORDER — ASPIRIN/CALCIUM CARB/MAGNESIUM 324 MG
81 TABLET ORAL DAILY
Qty: 0 | Refills: 0 | Status: DISCONTINUED | OUTPATIENT
Start: 2018-05-05 | End: 2018-05-11

## 2018-05-05 RX ORDER — UBIDECARENONE 100 MG
1 CAPSULE ORAL
Qty: 0 | Refills: 0 | COMMUNITY

## 2018-05-05 RX ORDER — WARFARIN SODIUM 2.5 MG/1
1 TABLET ORAL
Qty: 0 | Refills: 0 | COMMUNITY

## 2018-05-05 RX ORDER — DOCUSATE SODIUM 100 MG
1 CAPSULE ORAL
Qty: 0 | Refills: 0 | COMMUNITY

## 2018-05-05 RX ORDER — FUROSEMIDE 40 MG
1 TABLET ORAL
Qty: 0 | Refills: 0 | COMMUNITY

## 2018-05-05 RX ORDER — METOPROLOL TARTRATE 50 MG
100 TABLET ORAL DAILY
Qty: 0 | Refills: 0 | Status: DISCONTINUED | OUTPATIENT
Start: 2018-05-05 | End: 2018-05-05

## 2018-05-05 RX ORDER — MULTIVIT-MIN/FERROUS GLUCONATE 9 MG/15 ML
1 LIQUID (ML) ORAL
Qty: 0 | Refills: 0 | COMMUNITY

## 2018-05-05 RX ORDER — HEPARIN SODIUM 5000 [USP'U]/ML
5000 INJECTION INTRAVENOUS; SUBCUTANEOUS EVERY 8 HOURS
Qty: 0 | Refills: 0 | Status: DISCONTINUED | OUTPATIENT
Start: 2018-05-05 | End: 2018-05-05

## 2018-05-05 RX ORDER — METOPROLOL TARTRATE 50 MG
1 TABLET ORAL
Qty: 0 | Refills: 0 | COMMUNITY

## 2018-05-05 RX ORDER — METOPROLOL TARTRATE 50 MG
1.5 TABLET ORAL
Qty: 0 | Refills: 0 | COMMUNITY

## 2018-05-05 RX ORDER — METOPROLOL TARTRATE 50 MG
12.5 TABLET ORAL
Qty: 0 | Refills: 0 | Status: DISCONTINUED | OUTPATIENT
Start: 2018-05-05 | End: 2018-05-11

## 2018-05-05 RX ORDER — ALBUTEROL 90 UG/1
3 AEROSOL, METERED ORAL
Qty: 0 | Refills: 0 | COMMUNITY

## 2018-05-05 RX ORDER — SIMVASTATIN 20 MG/1
10 TABLET, FILM COATED ORAL AT BEDTIME
Qty: 0 | Refills: 0 | Status: DISCONTINUED | OUTPATIENT
Start: 2018-05-05 | End: 2018-05-08

## 2018-05-05 RX ORDER — PHYTONADIONE (VIT K1) 5 MG
2 TABLET ORAL ONCE
Qty: 0 | Refills: 0 | Status: COMPLETED | OUTPATIENT
Start: 2018-05-05 | End: 2018-05-05

## 2018-05-05 RX ADMIN — HEPARIN SODIUM 5000 UNIT(S): 5000 INJECTION INTRAVENOUS; SUBCUTANEOUS at 05:15

## 2018-05-05 RX ADMIN — Medication 81 MILLIGRAM(S): at 12:16

## 2018-05-05 RX ADMIN — SODIUM POLYSTYRENE SULFONATE 15 GRAM(S): 4.1 POWDER, FOR SUSPENSION ORAL at 00:19

## 2018-05-05 RX ADMIN — BUDESONIDE AND FORMOTEROL FUMARATE DIHYDRATE 2 PUFF(S): 160; 4.5 AEROSOL RESPIRATORY (INHALATION) at 21:36

## 2018-05-05 RX ADMIN — BUDESONIDE AND FORMOTEROL FUMARATE DIHYDRATE 2 PUFF(S): 160; 4.5 AEROSOL RESPIRATORY (INHALATION) at 10:26

## 2018-05-05 RX ADMIN — Medication 2 MILLIGRAM(S): at 15:23

## 2018-05-05 RX ADMIN — Medication 12.5 MILLIGRAM(S): at 21:36

## 2018-05-05 RX ADMIN — Medication 325 MILLIGRAM(S): at 12:16

## 2018-05-05 RX ADMIN — Medication 12.5 MILLIGRAM(S): at 09:15

## 2018-05-05 NOTE — H&P ADULT - NSHPLABSRESULTS_GEN_ALL_CORE
02/2018 TTE 60-65% Bioprosthetic mitral valve replacement. Peak mitral valve gradient equals 9 mm Hg, mean transmitral valve gradient equals 4 mm Hg, which is probably normal in the setting of a prosthetic valve. Bioprosthetic aortic valve replacement. Normal left ventricular systolic function. No segmental wall motion abnormalities. Septal consistent with right ventricular overload. A device wire is noted in the right heart. Severe right atrial enlargement. Right ventricular enlargement with normal right ventricular systolic function. Normal tricuspid valve. Moderate-severe tricuspid regurgitation. Estimated pulmonary artery systolic pressure equals 48mm Hg, assuming right atrial pressure equals 10  mm Hg, consistent with mild pulmonary hypertension.  UA: Negative  K 6.4  EKG: AV paced 73bpm peaked TW LABS and ADDITIONAL STUDIES:                        10.7   11.16 )-----------( 89 <--126      ( 05 May 2018 06:07 )             33.0     05-    137  |  95<L>  |  118<H>  ----------------------------<  136<H>  5.2   |  21<L>  |  4.32<H>    BUN/Cr (Aug 2017) 28/1.59    Ca    9.7      05 May 2018 03:20    TPro  6.8  /  Alb  3.6  /  TBili  1.7<H>  /  DBili  x   /  AST  68<H>  /  ALT  55<H>  /  AlkPhos  183<H>      LIVER FUNCTIONS - ( 05 May 2018 03:20 )  Alb: 3.6 g/dL / Pro: 6.8 g/dL / ALK PHOS: 183 u/L / ALT: 55 u/L / AST: 68 u/L / GGT: x           Urinalysis Basic - ( 04 May 2018 18:30 )  Color: YELLOW / Appearance: CLEAR / S.013 / pH: 5.5  Gluc: NEGATIVE / Ketone: NEGATIVE  / Bili: NEGATIVE / Urobili: NORMAL mg/dL   Blood: NEGATIVE / Protein: 30 mg/dL / Nitrite: NEGATIVE   Leuk Esterase: TRACE / RBC: 0-2 / WBC 2-5   Sq Epi: OCC / Non Sq Epi: x / Bacteria: x    Lactate, VBG - 3.9 -> 3.7    CR - clear lungs    2018 TTE 60-65% Bioprosthetic mitral valve replacement. Peak mitral valve gradient equals 9 mm Hg, mean transmitral valve gradient equals 4 mm Hg, which is probably normal in the setting of a prosthetic valve. Bioprosthetic aortic valve replacement. Normal left ventricular systolic function. No segmental wall motion abnormalities. Septal consistent with right ventricular overload. A device wire is noted in the right heart. Severe right atrial enlargement. Right ventricular enlargement with normal right ventricular systolic function. Normal tricuspid valve. Moderate-severe tricuspid regurgitation. Estimated pulmonary artery systolic pressure equals 48mm Hg, assuming right atrial pressure equals 10  mm Hg, consistent with mild pulmonary hypertension.    EKG: AV paced 73bpm peaked TW

## 2018-05-05 NOTE — H&P ADULT - NSHPPHYSICALEXAM_GEN_ALL_CORE
Appearance: Normal, NAD, NRD.  HEENT:   Normal oral mucosa, PERRL, EOMI	  Cardiovascular: Normal S1 S2, No JVD, No murmurs, No edema  Respiratory: Lungs clear to auscultation, no rales/rhonchi/wheezing	  Psychiatry: A & O x 3, Mood & affect appropriate  Gastrointestinal:  Soft, Non-tender, ND + BS	  Skin: No rashes, No ecchymoses, No cyanosis  Neurologic: Non-focal, sensation intact,  strength 5/5 B/L, CN grossly intact  Extremities: Normal range of motion, No clubbing, cyanosis or 2+ non-pitting edema (chronic per patient)  Vascular: Peripheral pulses palpable Vital Signs Last 24 Hrs  T(C): 36.4 (05 May 2018 05:12), Max: 36.9 (04 May 2018 15:47)  T(F): 97.6 (05 May 2018 05:12), Max: 98.5 (04 May 2018 15:47)  HR: 62 (05 May 2018 05:12) (59 - 63)  BP: 101/50 (05 May 2018 05:12) (101/50 - 120/42)  BP(mean): --  RR: 18 (05 May 2018 05:12) (15 - 18)  SpO2: 100% (05 May 2018 05:12) (100% - 100%)    Appearance: Normal, NAD  HEENT: Normal oral mucosa, PERRL, EOMI	  Cardiovascular: Normal S1 S2, No JVD, No murmurs, No edema  Respiratory: Lungs clear to auscultation, no rales/rhonchi/wheezing	  Psychiatry: A & O x 2-3, Mood & affect appropriate  Gastrointestinal:  Soft, Non-tender, ND + BS	  Skin: No rashes, No ecchymoses, No cyanosis  Neurologic: Non-focal, sensation intact,  strength 5/5 B/L, CN grossly intact  Extremities: Normal range of motion, 2+ pitting edema upto b/l knees (chronic per patient)  Vascular: Peripheral pulses palpable

## 2018-05-05 NOTE — H&P ADULT - PROBLEM SELECTOR PLAN 8
Heparin subq Verify medications in AM.  Monitor BP serially  Sodium restriction. - Placed on insulin sliding scale  - Fingersticks before meals and at bedtime  - Hemoglobin A1C in AM  - Verify meds in AM

## 2018-05-05 NOTE — H&P ADULT - PROBLEM SELECTOR PLAN 4
Placed on insulin sliding scale  Fingersticks before meals and at bedtime  Hemoglobin A1C in AM Patient does not know all of her medications- will verify with [facility] in AM  She takes Coumadin for Afib  INR ordered for AM  Currently rate controlled - Multifactorial causes for her elevated blood lactate (renal failure, decreased liver elimination, possibly medication use), will repeat level and monitor, was trending down overnight

## 2018-05-05 NOTE — H&P ADULT - PROBLEM SELECTOR PROBLEM 2
Acute renal failure (ARF) Acute renal failure superimposed on stage 3 chronic kidney disease, unspecified acute renal failure type

## 2018-05-05 NOTE — H&P ADULT - PROBLEM SELECTOR PROBLEM 7
HTN (Hypertension) Dyslipidemia Coronary artery disease involving native coronary artery of native heart without angina pectoris

## 2018-05-05 NOTE — CONSULT NOTE ADULT - SUBJECTIVE AND OBJECTIVE BOX
[facility] DIVISION OF KIDNEY DISEASES AND HYPERTENSION -- INITIAL CONSULT NOTE  --------------------------------------------------------------------------------  HPI: This is a 89 y/o F with a PMHx of HTN, DM, CHF, PVD, chronic b/l LE edema, Afib, CAD s/p CABG and stenting, valvulopathy and CKD was sent in by her PCP for abnormal labs.  Pt. went to her PCP on  and was found to have elevated Scr of 3 and serum potassium of 6. As per review on labs from [facility], pt. Scr ranges from 1.5-2 since 2017. Pt. has not seen a nephrologist. Pt. denies any use of NSAIDs. Pt. c/o decreased PO intake for 1 week and episodes of N/V. Pt. received medical managment of hyperkalemia and serum potassium improved to 5.2 today. Currently pt resting in bed. No complaints.           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  S/P : x 1  Cataract, age-related: b/l surgeries  Pacemaker: Guidant 2005  PG Device: insignia I ultra IS-1 Comp, [id number], [id number]  Mitral Valvular Disorder: S/P MVR bovine   Aortic Valvar Stenosis: S/P AVR bovine 2005  Model 2700  Serial [id number]  CABG (Coronary Artery Bypass Graft):     FAMILY HISTORY:  Family history of hypertension (Sibling)  Family history of diabetes mellitus (Sibling)      ALLERGIES & MEDICATIONS  --------------------------------------------------------------------------------  Allergies    No Known Allergies    Intolerances    lisinopril (Other)    Standing Inpatient Medications  aspirin  chewable 81 milliGRAM(s) Oral daily  buDESOnide 160 MICROgram(s)/formoterol 4.5 MICROgram(s) Inhaler 2 Puff(s) Inhalation two times a day  metoprolol tartrate 12.5 milliGRAM(s) Oral two times a day  simvastatin 10 milliGRAM(s) Oral at bedtime    PRN Inpatient Medications  ALBUTerol/ipratropium for Nebulization 3 milliLiter(s) Nebulizer every 4 hours PRN      REVIEW OF SYSTEMS  --------------------------------------------------------------------------------  Gen: weakness  Skin: No rashes  Head/Eyes/Ears/Mouth: No headache;   Respiratory: No dyspnea,  CV: No chest pain  GI: No abdominal pain,   : No increased frequency,  MSK:  chronic LE edema  Neuro: No dizziness      All other systems were reviewed and are negative, except as noted.    VITALS/PHYSICAL EXAM  --------------------------------------------------------------------------------  T(C): 36.4 (18 @ 05:12), Max: 36.9 (18 @ 15:47)  HR: 60 (18 09:03) (59 - 63)  BP: 106/59 (18 09:03) (101/50 - 120/42)  RR: 18 (18 05:12) (15 - 18)  SpO2: 100% (18 05:12) (100% - 100%)  Wt(kg): --  Height (cm): 152.4 (18 00:54)  Weight (kg): 67 (18 00:54)  BMI (kg/m2): 28.8 (18 00:54)  BSA (m2): 1.64 (18 00:54)      18 @ 07:01  -  18 @ 07:00  --------------------------------------------------------  IN: 120 mL / OUT: 0 mL / NET: 120 mL      Physical Exam:  	Gen: NAD,   	HEENT: supple neck  	Pulm: CTA B/L  	CV: RRR,  	Abd: +BS, soft, nondistended  	: No suprapubic tenderness  	LE: Warm b/l LE edema  	Neuro:  Resting   	Skin: Warm, without rashes      LABS/STUDIES  --------------------------------------------------------------------------------              10.7   11.16 >-----------<  89       [18 @ 06:07]              33.0     137  |  95  |  118  ----------------------------<  136      [18 @ 03:20]  5.2   |  21  |  4.32        Ca     9.7     [18 @ 03:20]    TPro  6.8  /  Alb  3.6  /  TBili  1.7  /  DBili  x   /  AST  68  /  ALT  55  /  AlkPhos  183  [18 @ 03:20]    PT/INR: PT 58.7 , INR 4.94       [18 @ 07:57]      Creatinine Trend:  SCr 4.32 [ 03:20]  SCr 4.37 [ 18:30]
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 Hematology consulted for TTP rule out.    The patient said this came on suddenly and now has gone away. she denies headaches, visual changes, numbness tingling, blood in her stool. She says she was never told she had abnormal blood counts before. She is currently eating ice chips and craves ice. She has not been eating much the past week she says.          was sent to ED by PCP Dr. Renner who found patient to be hyperkalemic (unknown value). Patient states she has been having generalized weakness and decreased appetite for 1 week. She has also had episodes of nausea and vomiting this week and has had decreased oral intake as a result. Denies headache, cp, palpitations, sob, abdominal pain, cough, uri symptoms, urinary symptoms, diarrhea. She denied being started on any new medications. Said that she is compliant with her medications but is not sure which medications she takes.     In the ED, her vitals were T 98.5, P 59, /56, R 18, O2 sat 100% RA. Lab work was significant for K found to be 6.4 mildly hemolyzed, BUN/Cr 121/4.37 (previously in 2017 28/1.58). EKG found to be AV paced 73bpm peaked T Waves. UA negative. She was administered IV Calcium gluconate 2g IVPB, Insulin Regular 5U/D50, sodium bicarb 50 mEq x1, Albuterol nebulizer x 2, Kayexalate. Repeat potassium level was 5.2. She was admitted to medicine on telemetry. She is currently asymptomatic resting comfortably in bed. 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  S/P : x 1  Cataract, age-related: b/l surgeries  Pacemaker: Guidant 2005  PG Device: insignia I ultra IS-1 Comp, [id number], [id number]  Mitral Valvular Disorder: S/P MVR bovine   Aortic Valvar Stenosis: S/P AVR bovine 2005  Model 2700  Serial [id number]  CABG (Coronary Artery Bypass Graft):       Allergies    No Known Allergies    Intolerances    lisinopril (Other)      MEDICATIONS  (STANDING):  aspirin  chewable 81 milliGRAM(s) Oral daily  buDESOnide 160 MICROgram(s)/formoterol 4.5 MICROgram(s) Inhaler 2 Puff(s) Inhalation two times a day  docusate sodium 100 milliGRAM(s) Oral daily  ferrous    sulfate 325 milliGRAM(s) Oral daily  metoprolol tartrate 12.5 milliGRAM(s) Oral two times a day  simvastatin 10 milliGRAM(s) Oral at bedtime    MEDICATIONS  (PRN):  ALBUTerol/ipratropium for Nebulization 3 milliLiter(s) Nebulizer every 4 hours PRN Shortness of Breath and/or Wheezing      FAMILY HISTORY:  Family history of hypertension (Sibling)  Family history of diabetes mellitus (Sibling)      SOCIAL HISTORY: No EtOH, no tobacco    REVIEW OF SYSTEMS:    CONSTITUTIONAL: No weakness, fevers or chills  EYES/ENT: No visual changes;  No vertigo or throat pain   NECK: No pain or stiffness  RESPIRATORY: No cough, wheezing, hemoptysis; No shortness of breath  CARDIOVASCULAR: No chest pain or palpitations  GASTROINTESTINAL: No abdominal or epigastric pain. No nausea, vomiting, or hematemesis; No diarrhea or constipation. No melena or hematochezia.  GENITOURINARY: No dysuria, frequency or hematuria  NEUROLOGICAL: No numbness or weakness  SKIN: No itching, burning, rashes, or lesions   All other review of systems is negative unless indicated above.    Height (cm): 152.4 ( @ 00:54)  Weight (kg): 67 ( @ 00:54)  BMI (kg/m2): 28.8 ( @ 00:54)  BSA (m2): 1.64 ( @ 00:54)    T(F): 97.6 (18 @ 05:12), Max: 98.5 (18 @ 15:47)  HR: 60 (18 @ 09:03)  BP: 106/59 (18 @ 09:03)  RR: 18 (18 @ 05:12)  SpO2: 100% (18 @ 05:12)  Wt(kg): --    GENERAL: NAD, well-developed  HEAD:  Atraumatic, Normocephalic  EYES: EOMI, PERRLA, conjunctiva and sclera clear  NECK: Supple, No JVD  CHEST/LUNG: Clear to auscultation bilaterally; No wheeze  HEART: Regular rate and rhythm; No murmurs, rubs, or gallops  ABDOMEN: Soft, Nontender, Nondistended; Bowel sounds present  EXTREMITIES:  2+ Peripheral Pulses, No clubbing, cyanosis, or edema  NEUROLOGY: non-focal  SKIN: No rashes or lesions                          10.5   10.78 )-----------( 80       ( 05 May 2018 13:32 )             33.1           138  |  96<L>  |  119<H>  ----------------------------<  121<H>  5.3   |  21<L>  |  4.43<H>    Ca    9.2      05 May 2018 13:32  Mg     3.0         TPro  6.7  /  Alb  3.4  /  TBili  1.8<H>  /  DBili  x   /  AST  71<H>  /  ALT  53<H>  /  AlkPhos  169<H>        Magnesium, Serum: 3.0 mg/dL ( @ 13:32)  Lactate Dehydrogenase, Serum: 753 U/L ( @ 13:32)      PT/INR - ( 05 May 2018 13:32 )   PT: 59.7 SEC;   INR: 5.20          PTT - ( 05 May 2018 13:32 )  PTT:38.2 SEC

## 2018-05-05 NOTE — H&P ADULT - NSHPSOCIALHISTORY_GEN_ALL_CORE
Social History:  · Marital Status	Single  · Occupation	Retired  · Lives With	Daughter    Substance Use History:  · Substance Use	never used    Alcohol Use History:  · Have you ever consumed alcohol	yes...denies current alcohol use    Tobacco Usage:  · Tobacco Usage: Denies smoking history

## 2018-05-05 NOTE — H&P ADULT - PROBLEM SELECTOR PLAN 5
Continue current medications Placed on insulin sliding scale  Fingersticks before meals and at bedtime  Hemoglobin A1C in AM - Patient with low platelets of unclear etiology, possibly related to her acure renal failure  - For now, will repeat levels and if still decreasing then would obtain further work up and hematology eval  - Levels currently >50k

## 2018-05-05 NOTE — H&P ADULT - PROBLEM SELECTOR PLAN 6
Verify medications in AM.  Check FLP  DASH diet. Continue current medications - Patient does not know all of her medications- will verify with [facility] in AM  - She takes Coumadin for Afib, said that her last dose was the day prior to admission, will check PT/INR and then dose coumadin as needed  - INR ordered for AM  - Currently rate controlled, clarify if patient still taking metoprolol and the dosing of metoprolol

## 2018-05-05 NOTE — H&P ADULT - NSHPREVIEWOFSYSTEMS_GEN_ALL_CORE
Constitutional: No fever; no chills; fatigue or weight loss/gain; no diaphoresis  Skin: No rash, itching, ulcerations, dryness, pressure ulcer.  Eyes: no diplopia; no photophobia; no blurred vision   ENT: no hearing difficulty; no ear pain; no tinnitus; no vertigo; no nasal congestion; no nasal discharge; no nose bleeds no sore throat.  Endocrine: No thyroid problems.  Cardiovascular: No chest pain; no palpitations; no dyspnea on exertion; no PND; no orthopnea;   Respiratory: no shortness of breath; no wheezing; no dyspnea; no cough.  Gastrointestinal: No abdominal pain; no nausea; vomiting; or diarrhea.  Genitourinary: No dysuria.  Musculoskeletal: no arthralgia; no arthritis; no joint swelling; no muscle weakness  Neurologic: no transient paralysis; +weakness; no paresthesias; no difficulty walking; no headache; no syncope Constitutional: +generalized weakness; No fevers/chills  Skin: No rash, itching, ulcerations, dryness  Eyes: no diplopia; no photophobia; no blurred vision   ENT: no hearing difficulty; no ear pain; no tinnitus; no nasal congestion; no nasal discharge; no nose bleeds no sore throat.  Endocrine: No thyroid problems.  Cardiovascular: No chest pain; no palpitations; no dyspnea on exertion; no PND; no orthopnea; +LE edema (chronic)  Respiratory: no shortness of breath; no wheezing; no dyspnea; no cough.  Gastrointestinal: +Nausea/vomiting; No abdominal pain; no diarrhea.  Genitourinary: No dysuria.  Musculoskeletal: no arthralgia; no arthritis; no joint swelling; no muscle weakness  Neurologic: no transient paralysis; no focal weakness; no paresthesias; no difficulty walking; no headache; no syncope

## 2018-05-05 NOTE — CONSULT NOTE ADULT - ASSESSMENT
89 y/o F with multiple medical problems including DM, HTN, CHF, CAD was sent in by PCP for abnormal labs and was found to have JAKUB and hyperkalemia.

## 2018-05-05 NOTE — H&P ADULT - PROBLEM SELECTOR PLAN 7
Verify medications in AM.  Monitor BP serially  Sodium restriction. Verify medications in AM.  Check FLP  DASH diet. - Patient states she is on a baby aspirin, will c/w ASA 81 daily  - Will need to clarify meds in AM

## 2018-05-05 NOTE — CONSULT NOTE ADULT - PROBLEM SELECTOR RECOMMENDATION 2
Hyperkalemia in the setting of JAKUB. Serum potassium  to 5.2 with medical management. Advise medical management. Monitor serum potassium. Low potassium diet advised

## 2018-05-05 NOTE — H&P ADULT - PROBLEM SELECTOR PLAN 1
Hyperkalemia in the setting of ARF. EKG showed peaked Twaves.  Admit to telemetry for continuous cardiac monitoring.  Renal c/s in AM  Low potassium diet.  Repeat BMP reveals K 5.2   Avoid ACEI/ARB and K sparing diuretics - Hyperkalemia in the setting of ARF. EKG showed peaked Twaves.  - Levels improved after hyperkalemia cocktail, will continue with telemetry monitoring, will c/w monitoring her potassium levels  - Renal c/s in AM  - Low potassium diet.  - Avoid ACEI/ARB and K sparing diuretics

## 2018-05-05 NOTE — PROGRESS NOTE ADULT - PROBLEM SELECTOR PLAN 1
- Hyperkalemia in the setting of ARF. Admission EKG showed peaked Twaves.  - Levels improved after hyperkalemia cocktail, will continue with telemetry monitoring, will c/w monitoring her potassium levels  AM K 5.2  - cw Low potassium diet.  - Avoid ACEI/ARB and K sparing diuretics  continue to monitor

## 2018-05-05 NOTE — H&P ADULT - PROBLEM SELECTOR PLAN 3
Patient does not know all of her medications- will verify with [facility] in AM  She takes Coumadin for Afib  INR ordered for AM  Currently rate controlled mild transaminitis in the past  New elevation in Bilirubin level (1.7)  Repeat CMP - mild transaminitis in the past  - New elevation in Bilirubin level (1.7)  - Repeat CMP, if LFTs and bilirubin rising then would consider further w/u

## 2018-05-05 NOTE — CONSULT NOTE ADULT - ATTENDING COMMENTS
Patient examined and ROS reviewed. A case of JAKUB on CKD with background DM, HTN and CAD. Patient admitted with hyperkalemia which improved after medical management. Advise w/u for CKD.
Pt seen and examined. 89 yo w with anemia and thrombocytopenia. Labs c/w hemolysis. Reviewed PS- 1-3 schistocytes/HPF. 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 On review of prior records, creatinine was elevated last year. Not sure if she had been following up with PCP for this. Pt has no change in MS. Check kiesha, folate, B12, iron studies. Clinically, does not appear to be TTP. A/w above assessment and recs.

## 2018-05-05 NOTE — H&P ADULT - ASSESSMENT
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 Renner who found patient to be hyperkalemic. 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

## 2018-05-05 NOTE — H&P ADULT - PROBLEM SELECTOR PLAN 2
Renal US ordered r/o hydronephrosis  Urine studies ordered  Renal c/s in AM Renal US ordered r/o hydronephrosis  Urine studies ordered  Urine eosinophils ordered r/o ATN  Renal c/s in AM - Patient with CKD stage 3 now acutely worsened  - Patient states that she is still making her usual amounts of urine  - Will place on ins and outs  - Renal US ordered r/o hydronephrosis  - Urine studies ordered (urine lytes, urine creatinine, urine eosinophils, urine osms)  - Urine eosinophils ordered r/o AIN  - Would obtain Renal c/s in AM - Patient with CKD stage 3 now acutely worsened  - Patient states that she is still making her usual amounts of urine  - Will place on ins and outs  - Renal US ordered r/o hydronephrosis  - Urine studies ordered (urine lytes, urine creatinine, urine eosinophils, urine osms)  - Urine eosinophils ordered r/o AIN  - Would obtain Renal c/s in AM  - Patient's complaints of generalized weakness, nausea and vomiting are likely due to her elevated BUN level

## 2018-05-05 NOTE — CONSULT NOTE ADULT - ASSESSMENT
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 Hematology consulted for TTP rule out.    Anemia- macrocytic. her  LDH is high at 753. check a stat haptoglobin, Janie test.  peripheral smear reviewed : she does have average 2-3 schistocytes per hpf. there are rare fields that have 4 schistocytes per hpf, but this is rare. her smear DOES NOT have numerous schistocytes. she has elliptocytes. there are no microspherocytes seen. there are no blasts, no tear drops.   she has a high MCV please check b12, folate, reticulocyte count. if she is b12 deficient this could cause hemolysis.   check iron studies.   she also has a history of valve replacement which could cause some degree of hemolysis  on exam she is AAO x3, she is alert, she is afebrile. she does now have acute on chronic renal failure , creatinine is 4.37. appreciate nephrology input. check renal ultrasound    Thrombocytopenia- again as above, there are 2-3 schistocytes per hpf. check D dimer, fibrinogen, check coags daily. she is 89yo . she has a high MCV, she might have MDS. might need bone marrow biopsy pending further lab evaluation. continue to monitor platelet count. has not received heparin here.    discussed with and seen with attending hematologist, Dr. 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 Hematology consulted for TTP rule out.    Anemia- macrocytic. her  LDH is high at 753. check a stat haptoglobin, Janie test.  peripheral smear reviewed : she does have average 2-3 schistocytes per hpf. there are rare fields that have 4 schistocytes per hpf, but this is rare. her smear DOES NOT have numerous schistocytes. she has elliptocytes. there are no microspherocytes seen. there are no blasts, no tear drops.   she does have a high retic count which could signify hemolysis.   she has a high MCV please check b12, folate. if she is b12 deficient this could cause hemolysis.   check iron studies.   she also has a history of valve replacement which could cause some degree of hemolysis  on exam she is AAO x3, she is alert, she is afebrile. she does now have acute on chronic renal failure , creatinine is 4.37. appreciate nephrology input. check renal ultrasound    Thrombocytopenia- again as above, there are 2-3 schistocytes per hpf. check D dimer, fibrinogen, check coags daily. she is 89yo . she has a high MCV, she might have MDS. might need bone marrow biopsy pending further lab evaluation. continue to monitor platelet count. has not received heparin here.    discussed with and seen with attending hematologist, Dr. 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 Hematology consulted for TTP rule out.    Anemia- macrocytic. her  LDH is high at 753. check a stat haptoglobin, Janie test.  peripheral smear reviewed : she does have average 2-3 schistocytes per hpf. there are rare fields that have 4 schistocytes per hpf, but this is rare. her smear DOES NOT have numerous schistocytes. she has elliptocytes. there are no microspherocytes seen. there are no blasts, no tear drops.   she does have a high retic count which could signify hemolysis.   she has a high MCV please check b12, folate. if she is b12 deficient this could cause hemolysis.   check iron studies.   she also has a history of valve replacement which could cause some degree of hemolysis  on exam she is AAO x3, she is alert, she is afebrile. she does now have acute on chronic renal failure , creatinine is 4.37. appreciate nephrology input. check renal ultrasound.  at this time, we do not feel she has TTP and needs exchange. please monitor closely and we will follow with you.    Thrombocytopenia- again as above, there are 2-3 schistocytes per hpf. check D dimer, fibrinogen, check coags daily. she is 89yo . she has a high MCV, she might have MDS. might need bone marrow biopsy pending further lab evaluation. continue to monitor platelet count. has not received heparin here.    discussed with and seen with attending hematologist, Dr. 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 Hematology consulted for TTP rule out.    Anemia- macrocytic. her  LDH is high at 753. check a stat haptoglobin, Janie test.  peripheral smear reviewed : she does have average 2-3 schistocytes per hpf. there are rare fields that have 4 schistocytes per hpf, but this is rare. her smear DOES NOT have numerous schistocytes. she has elliptocytes. there are no microspherocytes seen. there are no blasts, no tear drops.   she does have a high retic count which could signify hemolysis.   she has a high MCV please check b12, folate. if she is b12 deficient this could cause hemolysis.   check iron studies.   she also has a history of valve replacement which could cause some degree of hemolysis  on exam she is AAO x3, she is alert, she is afebrile. she does now have acute on chronic renal failure , creatinine is 4.37. appreciate nephrology input. check renal ultrasound.  at this time, we do not feel she has TTP and needs exchange. please monitor closely and we will follow with you.    Thrombocytopenia- again as above, there are 2-3 schistocytes per hpf. check D dimer, fibrinogen, check coags daily. she is 89yo . she has a high MCV, she might have MDS. might need bone marrow biopsy pending further lab evaluation. continue to monitor platelet count. has not received heparin here.    elevated PT/INR- on coumadin for a-fib    discussed with and seen with attending hematologist, Dr. 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 Hematology consulted for TTP rule out.    Anemia- macrocytic. her  LDH is high at 753. check a stat haptoglobin, Janie test.  peripheral smear reviewed : she does have average 2-3 schistocytes per hpf. there are rare fields that have 4 schistocytes per hpf, but this is rare. her smear DOES NOT have numerous schistocytes. she has elliptocytes. there are no microspherocytes seen. there are no blasts, no tear drops.   she does have a high retic count which could signify hemolysis and she does have hyperbilirubinemia. trend bilirubin daily, check retic count , ldh and haptoglobin daily   she has a high MCV please check b12, folate. if she is b12 deficient this could cause hemolysis.   check iron studies.   she also has a history of valve replacement which could cause some degree of hemolysis  on exam she is AAO x3, she is alert, she is afebrile. she does now have acute on chronic renal failure , creatinine is 4.37. appreciate nephrology input. check renal ultrasound.  at this time, we do not feel she has TTP and needs exchange. please monitor closely and we will follow with you.    Thrombocytopenia- again as above, there are 2-3 schistocytes per hpf. check D dimer, fibrinogen, check coags daily. she is 89yo . she has a high MCV, she might have MDS. might need bone marrow biopsy pending further lab evaluation. continue to monitor platelet count. has not received heparin here.    elevated PT/INR- on coumadin for a-fib    discussed with and seen with attending hematologist, Dr. 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 Hematology consulted for TTP rule out.    Anemia- macrocytic. her  LDH is high at 753. check a stat haptoglobin, Janie test.  peripheral smear reviewed : she does have average 2-3 schistocytes per hpf. there are rare fields that have 4 schistocytes per hpf, but this is rare. her smear DOES NOT have numerous schistocytes. she has elliptocytes. there are no microspherocytes seen. there are no blasts, no tear drops.   she does have a high retic count which could signify hemolysis and she does have hyperbilirubinemia. trend bilirubin daily, check retic count , ldh and haptoglobin daily   she has a high MCV please check b12, folate. if she is b12 deficient this could cause hemolysis.   check iron studies. check myeloma workup including SPEP, serum immunofixation, quantitative immunoglobulins, serum free light chains, UPEP, b2 microglobulin.  she also has a history of valve replacement which could cause some degree of hemolysis  on exam she is AAO x3, she is alert, she is afebrile. she does now have acute on chronic renal failure , creatinine is 4.37. appreciate nephrology input. check renal ultrasound.  at this time, we do not feel she has TTP and needs exchange. please monitor closely and we will follow with you.    Thrombocytopenia- again as above, there are 2-3 schistocytes per hpf. check D dimer, fibrinogen, check coags daily. she is 89yo . she has a high MCV, she might have MDS. might need bone marrow biopsy pending further lab evaluation. continue to monitor platelet count. has not received heparin here.    elevated PT/INR- on coumadin for a-fib    discussed with and seen with attending hematologist, Dr. 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 Hematology consulted for TTP rule out.    Anemia- macrocytic. her  LDH is high at 753. check a stat haptoglobin, Janie test.  peripheral smear reviewed : she does have average 2-3 schistocytes per hpf.. her smear DOES NOT have numerous schistocytes. she has elliptocytes. there are no microspherocytes seen. there are no blasts, no tear drops.   she does have a high retic count which could signify hemolysis and she does have hyperbilirubinemia. trend bilirubin daily, check retic count , ldh and haptoglobin daily   she has a high MCV please check b12, folate. if she is b12 deficient this could cause hemolysis.   check iron studies. check myeloma workup including SPEP, serum immunofixation, quantitative immunoglobulins, serum free light chains, UPEP, b2 microglobulin.  she also has a history of valve replacement which could cause some degree of hemolysis  on exam she is AAO x3, she is alert, she is afebrile. she does now have acute on chronic renal failure , creatinine is 4.37. appreciate nephrology input. check renal ultrasound.  at this time, we do not feel she has TTP and needs exchange. please monitor closely and we will follow with you.    Thrombocytopenia- again as above, there are 2-3 schistocytes per hpf. check D dimer, fibrinogen, check coags daily. she is 89yo . she has a high MCV, she might have MDS. might need bone marrow biopsy pending further lab evaluation. continue to monitor platelet count. has not received heparin here.    elevated PT/INR- on coumadin for a-fib    discussed with and seen with attending hematologist, Dr. 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 Hematology consulted for TTP rule out.    Anemia- macrocytic. her  LDH is high at 753. check a stat haptoglobin, Janie test.  peripheral smear reviewed : she does have average 2-3 schistocytes per hpf.. her smear DOES NOT have numerous schistocytes. she has elliptocytes. there are no microspherocytes seen. there are no blasts, no tear drops.   she does have a high retic count which could signify hemolysis and she does have hyperbilirubinemia. trend bilirubin daily, check retic count , ldh and haptoglobin daily   she has a high MCV please check b12, folate. if she is b12 deficient this could cause hemolysis.   check iron studies. check myeloma workup including SPEP, serum immunofixation, quantitative immunoglobulins, serum free light chains, UPEP, b2 microglobulin.  she also has a history of valve replacement which could cause some degree of hemolysis  on exam she is AAO x3, she is alert, she is afebrile. she does now have acute on chronic renal failure , creatinine is 4.37. appreciate nephrology input. check renal ultrasound. check abdominal u/s to assess for splenomegaly  at this time, we do not feel she has TTP and needs exchange. please monitor closely and we will follow with you.    Thrombocytopenia- again as above, there are 2-3 schistocytes per hpf. check D dimer, fibrinogen, check coags daily. she is 89yo . she has a high MCV, she might have MDS. might need bone marrow biopsy pending further lab evaluation. continue to monitor platelet count. has not received heparin here.    elevated PT/INR- on coumadin for a-fib    discussed with and seen with attending hematologist, Dr. Steel

## 2018-05-05 NOTE — CONSULT NOTE ADULT - PROBLEM SELECTOR RECOMMENDATION 9
Pt. with JAKUB on CKD. CKD likely in the setting of DM, HTN, CHF. JAKUB likely hemodynamically mediated in the setting of decreased PO intake, N/V and low BP readings. As per review on labs from [facility], pt. Scr ranges from 1.5-2 since 8/2017. Scr on admission was elevated at 4.3 and remains 4.3 today. Pt. non-oliguric. Advise to check renal sonogram and spot TP/CR. Pt. with thrombocytopenia/anemia. Check hemolytic panel including LDH, haptoglobin and peripheral smear for schistocytes. Check serological w/u including ANCA, hepatitis b surface antigen, hep c surface antibody, C3/C4, anti-GBM and serum FLC and serum ASIF. Monitor BMP, urine output, I/Os. Avoid any potential nephrotoxins

## 2018-05-05 NOTE — PROGRESS NOTE ADULT - PROBLEM SELECTOR PLAN 2
Appreciate Renal consult- JAKUB on CKD  likely hemodynamically mediated in the setting of decreased PO intake, N/V and low BP readings.   - Patient states that she is still making her usual amounts of urine  -- Renal US ordered r/o hydronephrosis  - Urine studies ordered (urine lytes, urine creatinine, urine eosinophils, urine osms)  - Urine eosinophils ordered r/o AIN  - Renal recommends checking hemolytic panel including LDH, haptoglobin and peripheral smear for schistocytes.  Heme consulted - JASON Heme fellow

## 2018-05-05 NOTE — PROGRESS NOTE ADULT - SUBJECTIVE AND OBJECTIVE BOX
Patient is a 89y old  Female who presents with a chief complaint of Hyperkalemia (05 May 2018 04:33)      SUBJECTIVE / OVERNIGHT EVENTS: pt seen and examined by me  She reports she feels better since admission  SOB improved  Decreased appetite since months     MEDICATIONS  (STANDING):  aspirin  chewable 81 milliGRAM(s) Oral daily  buDESOnide 160 MICROgram(s)/formoterol 4.5 MICROgram(s) Inhaler 2 Puff(s) Inhalation two times a day  docusate sodium 100 milliGRAM(s) Oral daily  ferrous    sulfate 325 milliGRAM(s) Oral daily  metoprolol tartrate 12.5 milliGRAM(s) Oral two times a day  simvastatin 10 milliGRAM(s) Oral at bedtime    MEDICATIONS  (PRN):  ALBUTerol/ipratropium for Nebulization 3 milliLiter(s) Nebulizer every 4 hours PRN Shortness of Breath and/or Wheezing    Vital Signs Last 24 Hrs  T(C): 36.4 (05 May 2018 05:12), Max: 36.9 (04 May 2018 15:47)  T(F): 97.6 (05 May 2018 05:12), Max: 98.5 (04 May 2018 15:47)  HR: 60 (05 May 2018 09:03) (59 - 63)  BP: 106/59 (05 May 2018 09:03) (101/50 - 120/42)  BP(mean): --  RR: 18 (05 May 2018 05:12) (15 - 18)  SpO2: 100% (05 May 2018 05:12) (100% - 100%)    CAPILLARY BLOOD GLUCOSE      POCT Blood Glucose.: 188 mg/dL (05 May 2018 00:30)    I&O's Summary    04 May 2018 07:01  -  05 May 2018 07:00  --------------------------------------------------------  IN: 120 mL / OUT: 0 mL / NET: 120 mL        PHYSICAL EXAM:  GENERAL: NAD, well-developed  HEAD:  Atraumatic, Normocephalic  EYES: EOMI, PERRLA, conjunctiva and sclera clear  NECK: Supple, No JVD  CHEST/LUNG: Clear to auscultation bilaterally; No wheeze  HEART: Regular rate and rhythm; Loud S1, presence of holosytolic murmur at apex   ABDOMEN: Soft, Nontender, Nondistended; Bowel sounds present  EXTREMITIES:  2+ Peripheral Pulses, No clubbing, cyanosis, grade 2-3 edema   PSYCH: AAOx3  NEUROLOGY: non-focal  SKIN: No rashes or lesions    LABS:                        10.7   11.16 )-----------( 89       ( 05 May 2018 06:07 )             33.0         137  |  95<L>  |  118<H>  ----------------------------<  136<H>  5.2   |  21<L>  |  4.32<H>    Ca    9.7      05 May 2018 03:20    TPro  6.8  /  Alb  3.6  /  TBili  1.7<H>  /  DBili  x   /  AST  68<H>  /  ALT  55<H>  /  AlkPhos  183<H>      PT/INR - ( 05 May 2018 07:57 )   PT: 58.7 SEC;   INR: 4.94                Urinalysis Basic - ( 04 May 2018 18:30 )    Color: YELLOW / Appearance: CLEAR / S.013 / pH: 5.5  Gluc: NEGATIVE / Ketone: NEGATIVE  / Bili: NEGATIVE / Urobili: NORMAL mg/dL   Blood: NEGATIVE / Protein: 30 mg/dL / Nitrite: NEGATIVE   Leuk Esterase: TRACE / RBC: 0-2 / WBC 2-5   Sq Epi: OCC / Non Sq Epi: x / Bacteria: x        RADIOLOGY & ADDITIONAL TESTS:    Imaging Personally Reviewed:    Consultant(s) Notes Reviewed:      Care Discussed with Consultants/Other Providers:Heme, Renal

## 2018-05-05 NOTE — H&P ADULT - PROBLEM SELECTOR PLAN 9
Heparin subq - Patient states she is on advair and albuterol, will therefore place on advair 250/50 and duonebs prn, need to clarify meds in AM

## 2018-05-05 NOTE — H&P ADULT - HISTORY OF PRESENT ILLNESS
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 Renner who found patient to be hyperkalemic (unknown value). Patient endorses generalized weakness and decreased appetite for 1 week. She experienced an episode of nausea and vomiting this week. Denies headache, cp, sob, abdominal pain, cough, uri symptoms, urinary symptoms, diarrhea.     In ED K found to be 6.4 mildly hemolyzed, BUN/Cr 121/4.37 (previously in 08/2017 28/1.58). EKG found to be AV paced 73bpm peaked T Waves. UA negative. She was administered IV Calcium gluconate, Insulin, D50, Albuterol nebulizer x 2, Kayexalate. She is currently asymptomatic resting comfortably in bed. 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 Renner who found patient to be hyperkalemic (unknown value). Patient states she has been having generalized weakness and decreased appetite for 1 week. She has also had episodes of nausea and vomiting this week. Denies headache, cp, palpitations, sob, abdominal pain, cough, uri symptoms, urinary symptoms, diarrhea. She denied being started on any new medications. Said that she is compliant with her medications but is not sure which medications she takes.     In the ED, her vitals were T 98.5, P 59, /56, R 18, O2 sat 100% RA. Lab work was significant for K found to be 6.4 mildly hemolyzed, BUN/Cr 121/4.37 (previously in 08/2017 28/1.58). EKG found to be AV paced 73bpm peaked T Waves. UA negative. She was administered IV Calcium gluconate 2g IVPB, Insulin Regular 5U/D50, sodium bicarb 50 mEq x1, Albuterol nebulizer x 2, Kayexalate. Repeat potassium level was 5.2. She was admitted to medicine on telemetry. She is currently asymptomatic resting comfortably in bed. 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 Renner who found patient to be hyperkalemic (unknown value). Patient states she has been having generalized weakness and decreased appetite for 1 week. She has also had episodes of nausea and vomiting this week and has had decreased oral intake as a result. Denies headache, cp, palpitations, sob, abdominal pain, cough, uri symptoms, urinary symptoms, diarrhea. She denied being started on any new medications. Said that she is compliant with her medications but is not sure which medications she takes.     In the ED, her vitals were T 98.5, P 59, /56, R 18, O2 sat 100% RA. Lab work was significant for K found to be 6.4 mildly hemolyzed, BUN/Cr 121/4.37 (previously in 08/2017 28/1.58). EKG found to be AV paced 73bpm peaked T Waves. UA negative. She was administered IV Calcium gluconate 2g IVPB, Insulin Regular 5U/D50, sodium bicarb 50 mEq x1, Albuterol nebulizer x 2, Kayexalate. Repeat potassium level was 5.2. She was admitted to medicine on telemetry. She is currently asymptomatic resting comfortably in bed.

## 2018-05-06 DIAGNOSIS — D58.9 HEREDITARY HEMOLYTIC ANEMIA, UNSPECIFIED: ICD-10-CM

## 2018-05-06 DIAGNOSIS — D64.9 ANEMIA, UNSPECIFIED: ICD-10-CM

## 2018-05-06 DIAGNOSIS — R79.1 ABNORMAL COAGULATION PROFILE: ICD-10-CM

## 2018-05-06 LAB
ALBUMIN SERPL ELPH-MCNC: 3.4 G/DL — SIGNIFICANT CHANGE UP (ref 3.3–5)
ALP SERPL-CCNC: 150 U/L — HIGH (ref 40–120)
ALT FLD-CCNC: 49 U/L — HIGH (ref 4–33)
APTT BLD: 35.2 SEC — SIGNIFICANT CHANGE UP (ref 27.5–37.4)
AST SERPL-CCNC: 67 U/L — HIGH (ref 4–32)
BILIRUB DIRECT SERPL-MCNC: 1.2 MG/DL — HIGH (ref 0.1–0.2)
BILIRUB SERPL-MCNC: 2.1 MG/DL — HIGH (ref 0.2–1.2)
BLD GP AB SCN SERPL QL: NEGATIVE — SIGNIFICANT CHANGE UP
BUN SERPL-MCNC: 135 MG/DL — HIGH (ref 7–23)
CALCIUM SERPL-MCNC: 8.8 MG/DL — SIGNIFICANT CHANGE UP (ref 8.4–10.5)
CHLORIDE SERPL-SCNC: 94 MMOL/L — LOW (ref 98–107)
CLOSURE TME COLL+EPINEP BLD: 68 K/UL — LOW (ref 150–400)
CO2 SERPL-SCNC: 19 MMOL/L — LOW (ref 22–31)
CREAT SERPL-MCNC: 4.58 MG/DL — HIGH (ref 0.5–1.3)
DAT C3-SP REAG RBC QL: NEGATIVE — SIGNIFICANT CHANGE UP
DAT C3-SP REAG RBC QL: NEGATIVE — SIGNIFICANT CHANGE UP
DAT POLY-SP REAG RBC QL: NEGATIVE — SIGNIFICANT CHANGE UP
DAT POLY-SP REAG RBC QL: NEGATIVE — SIGNIFICANT CHANGE UP
DIRECT COOMBS IGG: NEGATIVE — SIGNIFICANT CHANGE UP
DIRECT COOMBS IGG: NEGATIVE — SIGNIFICANT CHANGE UP
FERRITIN SERPL-MCNC: 848 NG/ML — HIGH (ref 15–150)
FOLATE SERPL-MCNC: > 20 NG/ML — HIGH (ref 4.7–20)
GLUCOSE SERPL-MCNC: 87 MG/DL — SIGNIFICANT CHANGE UP (ref 70–99)
HAPTOGLOB SERPL-MCNC: < 20 MG/DL — LOW (ref 34–200)
HAPTOGLOB SERPL-MCNC: < 20 MG/DL — LOW (ref 34–200)
HBV SURFACE AG SER-ACNC: NEGATIVE — SIGNIFICANT CHANGE UP
HCT VFR BLD CALC: 31.7 % — LOW (ref 34.5–45)
HCV AB S/CO SERPL IA: 0.11 S/CO — SIGNIFICANT CHANGE UP
HCV AB SERPL-IMP: SIGNIFICANT CHANGE UP
HGB BLD-MCNC: 10.4 G/DL — LOW (ref 11.5–15.5)
IGE SERPL-ACNC: 380 IU/ML — HIGH (ref 0–100)
INR BLD: 3.6 — HIGH (ref 0.88–1.17)
LDH SERPL L TO P-CCNC: 696 U/L — HIGH (ref 135–225)
MAGNESIUM SERPL-MCNC: 3.1 MG/DL — HIGH (ref 1.6–2.6)
MCHC RBC-ENTMCNC: 32.8 % — SIGNIFICANT CHANGE UP (ref 32–36)
MCHC RBC-ENTMCNC: 33 PG — SIGNIFICANT CHANGE UP (ref 27–34)
MCV RBC AUTO: 100.6 FL — HIGH (ref 80–100)
NRBC # FLD: 1.12 — SIGNIFICANT CHANGE UP
NRBC FLD-RTO: 10.3 — SIGNIFICANT CHANGE UP
PLATELET # BLD AUTO: 75 K/UL — LOW (ref 150–400)
PMV BLD: SIGNIFICANT CHANGE UP FL (ref 7–13)
POTASSIUM SERPL-MCNC: 4.9 MMOL/L — SIGNIFICANT CHANGE UP (ref 3.5–5.3)
POTASSIUM SERPL-SCNC: 4.9 MMOL/L — SIGNIFICANT CHANGE UP (ref 3.5–5.3)
PROT SERPL-MCNC: 6.4 G/DL — SIGNIFICANT CHANGE UP (ref 6–8.3)
PROTHROM AB SERPL-ACNC: 41 SEC — HIGH (ref 9.8–13.1)
RBC # BLD: 3.15 M/UL — LOW (ref 3.8–5.2)
RBC # FLD: 22.5 % — HIGH (ref 10.3–14.5)
RETICS #: 290 K/UL — HIGH (ref 25–125)
RETICS/RBC NFR: 9.2 % — HIGH (ref 0.5–2.5)
RH IG SCN BLD-IMP: POSITIVE — SIGNIFICANT CHANGE UP
SODIUM SERPL-SCNC: 137 MMOL/L — SIGNIFICANT CHANGE UP (ref 135–145)
VIT B12 SERPL-MCNC: 2000 PG/ML — HIGH (ref 200–900)
WBC # BLD: 10.84 K/UL — HIGH (ref 3.8–10.5)
WBC # FLD AUTO: 10.84 K/UL — HIGH (ref 3.8–10.5)

## 2018-05-06 PROCEDURE — 86334 IMMUNOFIX E-PHORESIS SERUM: CPT | Mod: 26

## 2018-05-06 PROCEDURE — 99232 SBSQ HOSP IP/OBS MODERATE 35: CPT | Mod: GC

## 2018-05-06 PROCEDURE — 84165 PROTEIN E-PHORESIS SERUM: CPT | Mod: 26

## 2018-05-06 PROCEDURE — 76700 US EXAM ABDOM COMPLETE: CPT | Mod: 26

## 2018-05-06 PROCEDURE — 99233 SBSQ HOSP IP/OBS HIGH 50: CPT | Mod: GC

## 2018-05-06 PROCEDURE — 99233 SBSQ HOSP IP/OBS HIGH 50: CPT

## 2018-05-06 RX ORDER — DOCUSATE SODIUM 100 MG
100 CAPSULE ORAL THREE TIMES A DAY
Qty: 0 | Refills: 0 | Status: DISCONTINUED | OUTPATIENT
Start: 2018-05-06 | End: 2018-05-13

## 2018-05-06 RX ORDER — POLYETHYLENE GLYCOL 3350 17 G/17G
17 POWDER, FOR SOLUTION ORAL ONCE
Qty: 0 | Refills: 0 | Status: COMPLETED | OUTPATIENT
Start: 2018-05-06 | End: 2018-05-07

## 2018-05-06 RX ADMIN — Medication 12.5 MILLIGRAM(S): at 17:15

## 2018-05-06 RX ADMIN — Medication 81 MILLIGRAM(S): at 10:57

## 2018-05-06 RX ADMIN — BUDESONIDE AND FORMOTEROL FUMARATE DIHYDRATE 2 PUFF(S): 160; 4.5 AEROSOL RESPIRATORY (INHALATION) at 10:56

## 2018-05-06 RX ADMIN — NYSTATIN CREAM 1 APPLICATION(S): 100000 CREAM TOPICAL at 05:09

## 2018-05-06 RX ADMIN — Medication 100 MILLIGRAM(S): at 10:57

## 2018-05-06 RX ADMIN — Medication 325 MILLIGRAM(S): at 10:56

## 2018-05-06 RX ADMIN — Medication 100 MILLIGRAM(S): at 21:00

## 2018-05-06 RX ADMIN — Medication 12.5 MILLIGRAM(S): at 05:09

## 2018-05-06 RX ADMIN — NYSTATIN CREAM 1 APPLICATION(S): 100000 CREAM TOPICAL at 17:15

## 2018-05-06 RX ADMIN — BUDESONIDE AND FORMOTEROL FUMARATE DIHYDRATE 2 PUFF(S): 160; 4.5 AEROSOL RESPIRATORY (INHALATION) at 21:00

## 2018-05-06 NOTE — PROGRESS NOTE ADULT - PROBLEM SELECTOR PLAN 1
Pt. with JAKUB on CKD. CKD likely in the setting of DM, HTN, CHF. JAKUB likely hemodynamically mediated in the setting of decreased PO intake, N/V and low BP readings. As per review on labs from [facility], pt. Scr ranges from 1.5-2 since 8/2017. Scr on admission was elevated at 4.3 and remained elevated at 4.4 yesterday. Follow up AM labs. Pt. non-oliguric. Advise to check renal sonogram. Pt. with mild proteinuria spot urine TP/CR of 0.7. Pt. with thrombocytopenia and hemolytic anemia, less likely TTP/TMA as per heme/onc. Heme/onc note reviewed (5/5). Check serological w/u including ANCA, hepatitis b surface antigen, hep c surface antibody, C3/C4, anti-GBM and serum FLC and serum AISF. Monitor BMP, urine output, I/Os. Avoid any potential nephrotoxins.

## 2018-05-06 NOTE — PROGRESS NOTE ADULT - SUBJECTIVE AND OBJECTIVE BOX
[facility] DIVISION OF KIDNEY DISEASES AND HYPERTENSION -- FOLLOW UP NOTE  --------------------------------------------------------------------------------  HPI: This is a 89 y/o F with a PMHx of HTN, DM, CHF, PVD, chronic b/l LE edema, Afib, CAD s/p CABG and stenting, valvulopathy and CKD was sent in by her PCP for abnormal labs.  Pt. went to her PCP on 5/1 and was found to have elevated Scr of 3 and serum potassium of 6. As per review on labs from [facility]/[place], pt. Scr ranges from 1.5-2 since 8/2017. Pt. has not seen a nephrologist. Pt. denies any use of NSAIDs. Pt. c/o decreased PO intake for 1 week and episodes of N/V. Pt. received medical managment of hyperkalemia and serum potassium improved to 5.3 on repeat labs done yesterday.  Currently pt resting in bed. No complaints.             PAST HISTORY  --------------------------------------------------------------------------------  No significant changes to PMH, PSH, FHx, SHx, unless otherwise noted    ALLERGIES & MEDICATIONS  --------------------------------------------------------------------------------  Allergies    No Known Allergies    Intolerances    lisinopril (Other)    Standing Inpatient Medications  aspirin  chewable 81 milliGRAM(s) Oral daily  buDESOnide 160 MICROgram(s)/formoterol 4.5 MICROgram(s) Inhaler 2 Puff(s) Inhalation two times a day  docusate sodium 100 milliGRAM(s) Oral daily  ferrous    sulfate 325 milliGRAM(s) Oral daily  metoprolol tartrate 12.5 milliGRAM(s) Oral two times a day  nystatin Cream 1 Application(s) Topical two times a day  simvastatin 10 milliGRAM(s) Oral at bedtime        REVIEW OF SYSTEMS  --------------------------------------------------------------------------------  Gen: weakness  Skin: No rashes  Head/Eyes/Ears/Mouth: No headache;   Respiratory: No dyspnea,  CV: No chest pain  GI: No abdominal pain,   : No increased frequency,  MSK:  chronic LE edema  Neuro: No dizziness      All other systems were reviewed and are negative, except as noted.    VITALS/PHYSICAL EXAM  --------------------------------------------------------------------------------  T(C): 36.4 (05-06-18 @ 05:08), Max: 36.9 (05-05-18 @ 21:34)  HR: 60 (05-06-18 @ 05:08) (60 - 60)  BP: 124/74 (05-06-18 @ 05:08) (101/51 - 124/74)  RR: 18 (05-06-18 @ 05:08) (18 - 18)  SpO2: 99% (05-06-18 @ 05:08) (99% - 99%)  Wt(kg): --  Height (cm): 152.4 (05-05-18 @ 00:54)  Weight (kg): 67 (05-05-18 @ 00:54)  BMI (kg/m2): 28.8 (05-05-18 @ 00:54)  BSA (m2): 1.64 (05-05-18 @ 00:54)      Physical Exam:  	Gen: NAD,   	HEENT: supple neck  	Pulm: CTA B/L  	CV: RRR,  	Abd: +BS, soft, nondistended  	: No suprapubic tenderness  	LE: Warm b/l LE edema  	Neuro:  Resting   	Skin: Warm, without rashes    LABS/STUDIES  --------------------------------------------------------------------------------              10.5   10.78 >-----------<  80       [05-05-18 @ 13:32]              33.1     138  |  96  |  119  ----------------------------<  121      [05-05-18 @ 13:32]  5.3   |  21  |  4.43        Ca     9.2     [05-05-18 @ 13:32]      Mg     3.0     [05-05-18 @ 13:32]    TPro  6.7  /  Alb  3.4  /  TBili  1.8  /  DBili  x   /  AST  71  /  ALT  53  /  AlkPhos  169  [05-05-18 @ 13:32]    PT/INR: PT 50.7 , INR 4.28       [05-05-18 @ 22:50]  PTT: 38.2       [05-05-18 @ 13:32]          [05-05-18 @ 13:32]    Creatinine Trend:  SCr 4.43 [05-05 @ 13:32]  SCr 4.32 [05-05 @ 03:20]  SCr 4.37 [05-04 @ 18:30]    Urinalysis - [05-05-18 @ 18:23]      Color YELLOW / Appearance TURBID / SG 1.016 / pH 5.5      Gluc NEGATIVE / Ketone NEGATIVE  / Bili NEGATIVE / Urobili NORMAL       Blood LARGE / Protein 100 / Leuk Est LARGE / Nitrite NEGATIVE      RBC >50 / WBC >50 / Hyaline 25-50 / Gran  / Sq Epi OCC / Non Sq Epi  / Bacteria     Urine Creatinine 140.18      [05-05-18 @ 18:23]  Urine Protein 108.8      [05-05-18 @ 18:23]  Urine Sodium 22      [05-05-18 @ 20:45]  Urine Urea Nitrogen 582.8      [05-05-18 @ 20:45]  Urine Potassium 63.2      [05-05-18 @ 20:45]  Urine Chloride < 20      [05-05-18 @ 20:45]  Urine Osmolality 399      [05-05-18 @ 20:45]    Iron 73, TIBC 268, %sat --      [05-05-18 @ 22:50]  Ferritin 848.0      [05-05-18 @ 22:50]  HbA1c 5.6      [05-05-18 @ 06:07]  Lipid: chol 113, TG 88, HDL 28, LDL 75      [05-05-18 @ 06:07]

## 2018-05-06 NOTE — PROGRESS NOTE ADULT - PROBLEM SELECTOR PLAN 2
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 TMA 2/2 underlying autoimmune dx given acute renal failure.  pt also with macroytosis and elevated retic count  Coomb negative,B12 WNL , Iron studies cw AOCD

## 2018-05-06 NOTE — PROGRESS NOTE ADULT - SUBJECTIVE AND OBJECTIVE BOX
INTERVAL HPI/OVERNIGHT EVENTS:  Patient S&E at bedside. No o/n events, patient resting comfortably. No complaints at this time. Feels less weak today. Patient denies fever, chills, dizziness, weakness, CP, palpitations, SOB, cough, N/V/D/C, dysuria, changes in bowel movements, LE edema.    VITAL SIGNS:  T(F): 97.5 (18 @ 05:08)  HR: 60 (18 @ 05:08)  BP: 124/74 (18 @ 05:08)  RR: 18 (18 @ 05:08)  SpO2: 99% (18 @ 05:08)  Wt(kg): --    PHYSICAL EXAM:  Constitutional: NAD  Eyes: EOMI, sclera non-icteric  Neck: supple, no masses, no JVD  Respiratory: CTA b/l  Cardiovascular: RRR,  systolic murmur in aortic region  Gastrointestinal: soft, NTND, no masses palpable, + BS  Extremities: trace LE edema b/l in LE  Neurological: AAOx2    MEDICATIONS  (STANDING):  aspirin  chewable 81 milliGRAM(s) Oral daily  buDESOnide 160 MICROgram(s)/formoterol 4.5 MICROgram(s) Inhaler 2 Puff(s) Inhalation two times a day  docusate sodium 100 milliGRAM(s) Oral daily  ferrous    sulfate 325 milliGRAM(s) Oral daily  metoprolol tartrate 12.5 milliGRAM(s) Oral two times a day  nystatin Cream 1 Application(s) Topical two times a day  simvastatin 10 milliGRAM(s) Oral at bedtime    MEDICATIONS  (PRN):  ALBUTerol/ipratropium for Nebulization 3 milliLiter(s) Nebulizer every 4 hours PRN Shortness of Breath and/or Wheezing      Allergies    No Known Allergies    Intolerances    lisinopril (Other)      LABS:                        10.4   10.84 )-----------( 75       ( 06 May 2018 08:27 )             31.7     -    137  |  94<L>  |  135<H>  ----------------------------<  87  4.9   |  19<L>  |  4.58<H>    Ca    8.8      06 May 2018 08:27  Mg     3.1     -    TPro  6.4  /  Alb  3.4  /  TBili  2.1<H>  /  DBili  1.2<H>  /  AST  67<H>  /  ALT  49<H>  /  AlkPhos  150<H>  05    PT/INR - ( 06 May 2018 08:27 )   PT: 41.0 SEC;   INR: 3.60          PTT - ( 06 May 2018 08:27 )  PTT:35.2 SEC  Urinalysis Basic - ( 05 May 2018 18:23 )    Color: YELLOW / Appearance: TURBID / S.016 / pH: 5.5  Gluc: NEGATIVE / Ketone: NEGATIVE  / Bili: NEGATIVE / Urobili: NORMAL mg/dL   Blood: LARGE / Protein: 100 mg/dL / Nitrite: NEGATIVE   Leuk Esterase: LARGE / RBC: >50 / WBC >50   Sq Epi: OCC / Non Sq Epi: x / Bacteria: x        RADIOLOGY & ADDITIONAL TESTS:  Studies reviewed.    ASSESSMENT & PLAN:

## 2018-05-06 NOTE — PROGRESS NOTE ADULT - PROBLEM SELECTOR PLAN 1
Macrocytic likely due to reticulocytosis.  B12, folate wnl, iron studies consistent with AOCD. Her --> 696, hapto <20, t bili 2.1 (mostly direct 1.2), coomb's neg.  Hgb stable since yesterday.  Peripheral smear reviewed : she has 0-1 schistocytes/hpf today (yest 2-3 schistocytes per hpf), one field had 4 schistocytes, otherwise increased reticulocytes, some target and kishor cells, one nucleated rbc, no teardrop cells, mature wbcs, one plt clump and decreased plt count, giant plts seen.   She does have a high retic count and mild hyperbilirubinemia (though mostly direct) which could signify chronic hemolysis 2/2 to bioprosthetic valve vs. TMA 2/2 underlying autoimmune dx given acute renal failure.  LFTs elevated, agree with abd US to r/o liver etiology. Trend bilirubin daily, check retic count , ldh and haptoglobin daily.   Pending myeloma workup including SPEP, serum immunofixation, quantitative immunoglobulins, serum free light chains, UPEP, b2 microglobulin.  At this time, we do not feel she has TTP and no indication for PLEX at this time.

## 2018-05-06 NOTE — PROGRESS NOTE ADULT - PROBLEM SELECTOR PLAN 2
Less likely DIC as d-dimer and fibrinogen wnl.  Has not received heparin here.  Please send plt count in blue top tube tomorrow.  Continue to trend CBC daily.  If above workup is negative, may need BM biopsy to r/o MDS or underlying BM disorder. Less likely DIC as d-dimer and fibrinogen wnl.  Has not received heparin here.  Medications reviewed and unlikely the cause of thrombocytopenia.  Please send plt count in blue top tube tomorrow.  Continue to trend CBC daily.  If above workup is negative, may need BM biopsy to r/o MDS or underlying BM disorder.

## 2018-05-06 NOTE — PROGRESS NOTE ADULT - PROBLEM SELECTOR PLAN 2
Hyperkalemia in the setting of JAKUB. Serum potassium  to 5.3 yesterday with medical management. Advise medical management. Monitor serum potassium. Low potassium diet advised

## 2018-05-06 NOTE — PROGRESS NOTE ADULT - SUBJECTIVE AND OBJECTIVE BOX
Patient is a 89y old  Female who presents with a chief complaint of Hyperkalemia (05 May 2018 04:33)      SUBJECTIVE / OVERNIGHT EVENTS: pt seen and examined by me      MEDICATIONS  (STANDING):  aspirin  chewable 81 milliGRAM(s) Oral daily  buDESOnide 160 MICROgram(s)/formoterol 4.5 MICROgram(s) Inhaler 2 Puff(s) Inhalation two times a day  docusate sodium 100 milliGRAM(s) Oral daily  ferrous    sulfate 325 milliGRAM(s) Oral daily  metoprolol tartrate 12.5 milliGRAM(s) Oral two times a day  nystatin Cream 1 Application(s) Topical two times a day  simvastatin 10 milliGRAM(s) Oral at bedtime    MEDICATIONS  (PRN):  ALBUTerol/ipratropium for Nebulization 3 milliLiter(s) Nebulizer every 4 hours PRN Shortness of Breath and/or Wheezing    Vital Signs Last 24 Hrs  T(C): 36.4 (06 May 2018 05:08), Max: 36.9 (05 May 2018 21:34)  T(F): 97.5 (06 May 2018 05:08), Max: 98.4 (05 May 2018 21:34)  HR: 60 (06 May 2018 05:08) (60 - 60)  BP: 124/74 (06 May 2018 05:08) (101/51 - 124/74)  BP(mean): --  RR: 18 (06 May 2018 05:08) (18 - 18)  SpO2: 99% (06 May 2018 05:08) (99% - 99%)    CAPILLARY BLOOD GLUCOSE      POCT Blood Glucose.: 188 mg/dL (05 May 2018 00:30)    I&O's Summary    04 May 2018 07:01  -  05 May 2018 07:00  --------------------------------------------------------  IN: 120 mL / OUT: 0 mL / NET: 120 mL        PHYSICAL EXAM:  GENERAL: NAD, well-developed  HEAD:  Atraumatic, Normocephalic  EYES: EOMI, PERRLA, conjunctiva and sclera clear  NECK: Supple, No JVD  CHEST/LUNG: Clear to auscultation bilaterally; No wheeze  HEART: Regular rate and rhythm; Loud S1, presence of holosytolic murmur at apex   ABDOMEN: Soft, Nontender, Nondistended; Bowel sounds present  EXTREMITIES:  2+ Peripheral Pulses, No clubbing, cyanosis, grade 2-3 edema   PSYCH: AAOx3  NEUROLOGY: non-focal  SKIN: No rashes or lesions    LABS:                                              10.4   10.84 )-----------( 75       ( 06 May 2018 08:27 )             31.7        -    137  |  94<L>  |  135<H>  ----------------------------<  87  4.9   |  19<L>  |  4.58<H>    Ca    8.8      06 May 2018 08:27  Mg     3.1     -    TPro  6.4  /  Alb  3.4  /  TBili  2.1<H>  /  DBili  1.2<H>  /  AST  67<H>  /  ALT  49<H>  /  AlkPhos  150<H>        Urinalysis Basic - ( 04 May 2018 18:30 )    Color: YELLOW / Appearance: CLEAR / S.013 / pH: 5.5  Gluc: NEGATIVE / Ketone: NEGATIVE  / Bili: NEGATIVE / Urobili: NORMAL mg/dL   Blood: NEGATIVE / Protein: 30 mg/dL / Nitrite: NEGATIVE   Leuk Esterase: TRACE / RBC: 0-2 / WBC 2-5   Sq Epi: OCC / Non Sq Epi: x / Bacteria: x        RADIOLOGY & ADDITIONAL TESTS:    Imaging Personally Reviewed:    Consultant(s) Notes Reviewed:      Care Discussed with Consultants/Other Providers:Heme Patient is a 89y old  Female who presents with a chief complaint of Hyperkalemia (05 May 2018 04:33)      SUBJECTIVE / OVERNIGHT EVENTS: pt seen and examined by me  No new complaints  States she is feeling better than  before  Denies fever, chills,no change in urine outpt or color      MEDICATIONS  (STANDING):  aspirin  chewable 81 milliGRAM(s) Oral daily  buDESOnide 160 MICROgram(s)/formoterol 4.5 MICROgram(s) Inhaler 2 Puff(s) Inhalation two times a day  docusate sodium 100 milliGRAM(s) Oral daily  ferrous    sulfate 325 milliGRAM(s) Oral daily  metoprolol tartrate 12.5 milliGRAM(s) Oral two times a day  nystatin Cream 1 Application(s) Topical two times a day  simvastatin 10 milliGRAM(s) Oral at bedtime    MEDICATIONS  (PRN):  ALBUTerol/ipratropium for Nebulization 3 milliLiter(s) Nebulizer every 4 hours PRN Shortness of Breath and/or Wheezing    Vital Signs Last 24 Hrs  T(C): 36.4 (06 May 2018 05:08), Max: 36.9 (05 May 2018 21:34)  T(F): 97.5 (06 May 2018 05:08), Max: 98.4 (05 May 2018 21:34)  HR: 60 (06 May 2018 05:08) (60 - 60)  BP: 124/74 (06 May 2018 05:08) (101/51 - 124/74)  BP(mean): --  RR: 18 (06 May 2018 05:08) (18 - 18)  SpO2: 99% (06 May 2018 05:08) (99% - 99%)    CAPILLARY BLOOD GLUCOSE      POCT Blood Glucose.: 188 mg/dL (05 May 2018 00:30)    I&O's Summary    04 May 2018 07:01  -  05 May 2018 07:00  --------------------------------------------------------  IN: 120 mL / OUT: 0 mL / NET: 120 mL        PHYSICAL EXAM:  GENERAL: NAD, well-developed  HEAD:  Atraumatic, Normocephalic  EYES: EOMI, PERRLA, conjunctiva and sclera clear  NECK: Supple, No JVD  CHEST/LUNG: Clear to auscultation bilaterally; No wheeze  HEART: Regular rate and rhythm; Loud S1, presence of holosytolic murmur at apex   ABDOMEN: Soft, Nontender, Nondistended; Bowel sounds present  EXTREMITIES:  2+ Peripheral Pulses, No clubbing, cyanosis, grade 2-3 edema   PSYCH: AAOx3  NEUROLOGY: non-focal  SKIN: No rashes or lesions    LABS:                                              10.4   10.84 )-----------( 75       ( 06 May 2018 08:27 )             31.7        05-06    137  |  94<L>  |  135<H>  ----------------------------<  87  4.9   |  19<L>  |  4.58<H>    Ca    8.8      06 May 2018 08:27  Mg     3.1     -06    TPro  6.4  /  Alb  3.4  /  TBili  2.1<H>  /  DBili  1.2<H>  /  AST  67<H>  /  ALT  49<H>  /  AlkPhos  150<H>  -      Urinalysis Basic - ( 04 May 2018 18:30 )    Color: YELLOW / Appearance: CLEAR / S.013 / pH: 5.5  Gluc: NEGATIVE / Ketone: NEGATIVE  / Bili: NEGATIVE / Urobili: NORMAL mg/dL   Blood: NEGATIVE / Protein: 30 mg/dL / Nitrite: NEGATIVE   Leuk Esterase: TRACE / RBC: 0-2 / WBC 2-5   Sq Epi: OCC / Non Sq Epi: x / Bacteria: x        RADIOLOGY & ADDITIONAL TESTS:    Imaging Personally Reviewed:    Consultant(s) Notes Reviewed:      Care Discussed with Consultants/Other Providers:Heme

## 2018-05-06 NOTE — PROGRESS NOTE ADULT - ATTENDING COMMENTS
Pt seen and examined. PS reviewed. Rare schistocytes. CBC stable. A/w supportive care. Appreciate renal input. B12/folate/iron studies unrevealing. Kiesha negative. Please try to obtain recent labs from PCP. Fibrinogen and ddimer levels not c/w DIC. DDx includes hemolysis sec to bioprosthetic valve, kiesha neg hemolytic anemia, MDS. Continue to trend CBC. Low suspicion for TTP. If platelet count continues to drop, can send blood for JOAO TS13 level. A/w above assessment and recommendations. Pt seen and examined. PS reviewed. Rare schistocytes. CBC stable. A/w supportive care. Appreciate renal input. B12/folate/iron studies unrevealing. Kiesha negative. Please try to obtain recent labs from PCP. Fibrinogen and ddimer levels not c/w DIC. DDx includes hemolysis sec to bioprosthetic valve, kiesha neg hemolytic anemia, MDS. Continue to trend CBC. Low suspicion for TTP. If platelet count continues to drop, can send blood for JOAO TS13 level. Please also send blood for homocystine and MMA levels. A/w above assessment and recommendations. Pt seen and examined. PS reviewed. Rare schistocytes. Increased kishor cells on today's smear compared to yesterday's. CBC stable. A/w supportive care. Appreciate renal input. B12/folate/iron studies unrevealing. Kiesha negative. Please try to obtain recent labs from PCP. Fibrinogen and ddimer levels not c/w DIC. DDx includes hemolysis sec to bioprosthetic valve, kiesha neg hemolytic anemia, MDS, kishor cell anemia sec to renal failure. Continue to trend CBC. Low suspicion for TTP. Please send blood for homocystine and MMA levels. A/w above assessment and recommendations.

## 2018-05-06 NOTE — PROGRESS NOTE ADULT - ATTENDING COMMENTS
Patient examined and ROS reviewed. A case of JAKUB on CKD in the setting of DM, HTN, CHF. JAKUB likely hemodynamically mediated. Serum creatinine is stable.

## 2018-05-06 NOTE — PROGRESS NOTE ADULT - PROBLEM SELECTOR PLAN 1
Appreciate Renal consult- JAKUB on CKD  likely hemodynamically mediated in the setting of decreased PO intake, N/V and low BP readings.  Pt. with thrombocytopenia and hemolytic anemia, less likely TTP/TMA as per heme/onc as rare schistocytes noted   - Urine eosinophils - none seen   U protein to creatinie ratio- 0.8 gm/day   -- Await Renal US   FU further work up- complement, Vasculitis howard, Anti GBM, Ig levels, ANCA,MM howard   FU nephrology Appreciate Renal consult- JAKUB on CKD  likely hemodynamically mediated in the setting of decreased PO intake, N/V and low BP readings.  other possibility is auto immune or vasuclitic process   Pt. with thrombocytopenia and hemolytic anemia, less likely TTP/TMA as per heme/onc as rare schistocytes noted   - Urine eosinophils - none seen   U protein to creatinie ratio- 0.8 gm/day   -- Await Renal US   FU further work up- complement, Vasculitis howard, Anti GBM, Ig levels, ANCA,MM howard   FU nephrology

## 2018-05-07 DIAGNOSIS — D72.829 ELEVATED WHITE BLOOD CELL COUNT, UNSPECIFIED: ICD-10-CM

## 2018-05-07 LAB
ALBUMIN SERPL ELPH-MCNC: 3.5 G/DL — SIGNIFICANT CHANGE UP (ref 3.3–5)
ALP SERPL-CCNC: 158 U/L — HIGH (ref 40–120)
ALT FLD-CCNC: 53 U/L — HIGH (ref 4–33)
APTT BLD: 34.9 SEC — SIGNIFICANT CHANGE UP (ref 27.5–37.4)
AST SERPL-CCNC: 79 U/L — HIGH (ref 4–32)
BASOPHILS # BLD AUTO: 0.02 K/UL — SIGNIFICANT CHANGE UP (ref 0–0.2)
BASOPHILS NFR BLD AUTO: 0.1 % — SIGNIFICANT CHANGE UP (ref 0–2)
BILIRUB DIRECT SERPL-MCNC: 1.5 MG/DL — HIGH (ref 0.1–0.2)
BILIRUB SERPL-MCNC: 2.6 MG/DL — HIGH (ref 0.2–1.2)
BUN SERPL-MCNC: 143 MG/DL — HIGH (ref 7–23)
C-ANCA SER-ACNC: NEGATIVE — SIGNIFICANT CHANGE UP
C3 SERPL-MCNC: 97 MG/DL — SIGNIFICANT CHANGE UP (ref 80–180)
C4 SERPL-MCNC: 25 MG/DL — SIGNIFICANT CHANGE UP (ref 10–45)
CALCIUM SERPL-MCNC: 8.5 MG/DL — SIGNIFICANT CHANGE UP (ref 8.4–10.5)
CHLORIDE SERPL-SCNC: 90 MMOL/L — LOW (ref 98–107)
CO2 SERPL-SCNC: 18 MMOL/L — LOW (ref 22–31)
CREAT SERPL-MCNC: 4.55 MG/DL — HIGH (ref 0.5–1.3)
EOSINOPHIL # BLD AUTO: 0.02 K/UL — SIGNIFICANT CHANGE UP (ref 0–0.5)
EOSINOPHIL NFR BLD AUTO: 0.1 % — SIGNIFICANT CHANGE UP (ref 0–6)
GAS PNL BLDMV: SIGNIFICANT CHANGE UP
GBM IGG SER-ACNC: <0.2 U — SIGNIFICANT CHANGE UP
GLUCOSE SERPL-MCNC: 94 MG/DL — SIGNIFICANT CHANGE UP (ref 70–99)
HAPTOGLOB SERPL-MCNC: < 20 MG/DL — LOW (ref 34–200)
HCT VFR BLD CALC: 33.9 % — LOW (ref 34.5–45)
HGB BLD-MCNC: 11.1 G/DL — LOW (ref 11.5–15.5)
IMM GRANULOCYTES # BLD AUTO: 0.17 # — SIGNIFICANT CHANGE UP
IMM GRANULOCYTES NFR BLD AUTO: 0.9 % — SIGNIFICANT CHANGE UP (ref 0–1.5)
INR BLD: 3.08 — HIGH (ref 0.88–1.17)
KAPPA FREE LIGHT CHAINS, SERUM: 8.2 MG/DL — HIGH (ref 0.33–1.94)
LAMBDA FREE LIGHT CHAINS, SERUM: 2.47 MG/DL — SIGNIFICANT CHANGE UP (ref 0.57–2.63)
LDH SERPL L TO P-CCNC: 780 U/L — HIGH (ref 135–225)
LYMPHOCYTES # BLD AUTO: 0.51 K/UL — LOW (ref 1–3.3)
LYMPHOCYTES # BLD AUTO: 2.7 % — LOW (ref 13–44)
MAGNESIUM SERPL-MCNC: 3.2 MG/DL — HIGH (ref 1.6–2.6)
MCHC RBC-ENTMCNC: 32.7 % — SIGNIFICANT CHANGE UP (ref 32–36)
MCHC RBC-ENTMCNC: 33.1 PG — SIGNIFICANT CHANGE UP (ref 27–34)
MCV RBC AUTO: 101.2 FL — HIGH (ref 80–100)
MONOCYTES # BLD AUTO: 1.69 K/UL — HIGH (ref 0–0.9)
MONOCYTES NFR BLD AUTO: 8.8 % — SIGNIFICANT CHANGE UP (ref 2–14)
NEUTROPHILS # BLD AUTO: 16.79 K/UL — HIGH (ref 1.8–7.4)
NEUTROPHILS NFR BLD AUTO: 87.4 % — HIGH (ref 43–77)
NRBC # FLD: 1.17 — SIGNIFICANT CHANGE UP
NRBC FLD-RTO: 6.1 — SIGNIFICANT CHANGE UP
P-ANCA SER-ACNC: NEGATIVE — SIGNIFICANT CHANGE UP
PLATELET # BLD AUTO: 66 K/UL — LOW (ref 150–400)
PMV BLD: SIGNIFICANT CHANGE UP FL (ref 7–13)
POTASSIUM SERPL-MCNC: 5.4 MMOL/L — HIGH (ref 3.5–5.3)
POTASSIUM SERPL-SCNC: 5.4 MMOL/L — HIGH (ref 3.5–5.3)
PROT SERPL-MCNC: 6.4 G/DL — SIGNIFICANT CHANGE UP (ref 6–8.3)
PROTHROM AB SERPL-ACNC: 36.2 SEC — HIGH (ref 9.8–13.1)
RBC # BLD: 3.35 M/UL — LOW (ref 3.8–5.2)
RBC # FLD: 23 % — HIGH (ref 10.3–14.5)
RETICS #: 382 K/UL — HIGH (ref 25–125)
RETICS/RBC NFR: 11.4 % — HIGH (ref 0.5–2.5)
SODIUM SERPL-SCNC: 134 MMOL/L — LOW (ref 135–145)
WBC # BLD: 19.2 K/UL — HIGH (ref 3.8–10.5)
WBC # FLD AUTO: 19.2 K/UL — HIGH (ref 3.8–10.5)

## 2018-05-07 PROCEDURE — 99233 SBSQ HOSP IP/OBS HIGH 50: CPT

## 2018-05-07 PROCEDURE — 71045 X-RAY EXAM CHEST 1 VIEW: CPT | Mod: 26

## 2018-05-07 PROCEDURE — 99233 SBSQ HOSP IP/OBS HIGH 50: CPT | Mod: GC

## 2018-05-07 RX ORDER — WARFARIN SODIUM 2.5 MG/1
1 TABLET ORAL ONCE
Qty: 0 | Refills: 0 | Status: COMPLETED | OUTPATIENT
Start: 2018-05-07 | End: 2018-05-07

## 2018-05-07 RX ORDER — EZETIMIBE 10 MG/1
10 TABLET ORAL DAILY
Qty: 0 | Refills: 0 | Status: DISCONTINUED | OUTPATIENT
Start: 2018-05-07 | End: 2018-05-08

## 2018-05-07 RX ORDER — SODIUM POLYSTYRENE SULFONATE 4.1 MEQ/G
15 POWDER, FOR SUSPENSION ORAL ONCE
Qty: 0 | Refills: 0 | Status: COMPLETED | OUTPATIENT
Start: 2018-05-07 | End: 2018-05-07

## 2018-05-07 RX ADMIN — POLYETHYLENE GLYCOL 3350 17 GRAM(S): 17 POWDER, FOR SOLUTION ORAL at 12:30

## 2018-05-07 RX ADMIN — BUDESONIDE AND FORMOTEROL FUMARATE DIHYDRATE 2 PUFF(S): 160; 4.5 AEROSOL RESPIRATORY (INHALATION) at 21:05

## 2018-05-07 RX ADMIN — Medication 100 MILLIGRAM(S): at 05:20

## 2018-05-07 RX ADMIN — WARFARIN SODIUM 1 MILLIGRAM(S): 2.5 TABLET ORAL at 17:35

## 2018-05-07 RX ADMIN — NYSTATIN CREAM 1 APPLICATION(S): 100000 CREAM TOPICAL at 05:20

## 2018-05-07 RX ADMIN — EZETIMIBE 10 MILLIGRAM(S): 10 TABLET ORAL at 21:06

## 2018-05-07 RX ADMIN — Medication 81 MILLIGRAM(S): at 12:29

## 2018-05-07 RX ADMIN — Medication 12.5 MILLIGRAM(S): at 05:26

## 2018-05-07 RX ADMIN — SODIUM POLYSTYRENE SULFONATE 15 GRAM(S): 4.1 POWDER, FOR SUSPENSION ORAL at 17:34

## 2018-05-07 RX ADMIN — Medication 12.5 MILLIGRAM(S): at 17:35

## 2018-05-07 RX ADMIN — BUDESONIDE AND FORMOTEROL FUMARATE DIHYDRATE 2 PUFF(S): 160; 4.5 AEROSOL RESPIRATORY (INHALATION) at 08:30

## 2018-05-07 RX ADMIN — NYSTATIN CREAM 1 APPLICATION(S): 100000 CREAM TOPICAL at 17:36

## 2018-05-07 RX ADMIN — Medication 100 MILLIGRAM(S): at 14:29

## 2018-05-07 RX ADMIN — Medication 325 MILLIGRAM(S): at 12:29

## 2018-05-07 NOTE — PROGRESS NOTE ADULT - PROBLEM SELECTOR PLAN 1
Pt. with JAKUB on CKD. CKD likely in the setting of DM, HTN, CHF. JAKUB likely hemodynamically mediated in the setting of decreased PO intake, N/V and low BP readings. As per review [facility] labs [facility], pt. Scr ranges from 1.5-2 since 8/2017. Scr on admission was elevated at 4.3.  Patient taking bumex 1mg BID.  Based on urine studies and clinical exam, patient is likely intravascularly depleted.  Would consider fluid challenge, can give 250-500cc saline bolus and monitor renal function.

## 2018-05-07 NOTE — PROGRESS NOTE ADULT - PROBLEM SELECTOR PLAN 1
Send UA/UCx, BCx, CXR eval for source of infection.  Low clinical suspicion for acute cholecystitis but RUQ sono shows some mild GB thickening.

## 2018-05-07 NOTE — PROGRESS NOTE ADULT - SUBJECTIVE AND OBJECTIVE BOX
CC: F/U for leukocytosis    SUBJECTIVE / OVERNIGHT EVENTS:  Offers no new complaints.  Doesn't feel much different from yesterday.  Has chronic fatigue.  No F/C, N/V, CP, SOB, Cough, lightheadedness, dizziness, abdominal pain, diarrhea, dysuria.    MEDICATIONS  (STANDING):  aspirin  chewable 81 milliGRAM(s) Oral daily  buDESOnide 160 MICROgram(s)/formoterol 4.5 MICROgram(s) Inhaler 2 Puff(s) Inhalation two times a day  docusate sodium 100 milliGRAM(s) Oral three times a day  ferrous    sulfate 325 milliGRAM(s) Oral daily  metoprolol tartrate 12.5 milliGRAM(s) Oral two times a day  nystatin Cream 1 Application(s) Topical two times a day  simvastatin 10 milliGRAM(s) Oral at bedtime    MEDICATIONS  (PRN):  ALBUTerol/ipratropium for Nebulization 3 milliLiter(s) Nebulizer every 4 hours PRN Shortness of Breath and/or Wheezing      Vital Signs Last 24 Hrs  T(C): 36.3 (07 May 2018 05:19), Max: 36.4 (06 May 2018 20:51)  T(F): 97.4 (07 May 2018 05:19), Max: 97.5 (06 May 2018 20:51)  HR: 59 (07 May 2018 05:19) (59 - 60)  BP: 104/52 (07 May 2018 05:19) (94/48 - 108/58)  BP(mean): --  RR: 18 (07 May 2018 05:19) (18 - 18)  SpO2: 97% (07 May 2018 05:19) (97% - 100%)  CAPILLARY BLOOD GLUCOSE        I&O's Summary      PHYSICAL EXAM:  GENERAL: NAD, well-developed  HEAD:  Atraumatic, Normocephalic  EYES: EOMI, PERRLA, conjunctiva and sclera clear  NECK: Supple, No JVD  CHEST/LUNG: Clear to auscultation bilaterally; No wheeze  HEART: Regular rate and rhythm; Loud S1, presence of holosytolic murmur at apex   ABDOMEN: Soft, Nontender, Nondistended; Bowel sounds present  EXTREMITIES:  2+ Peripheral Pulses, No clubbing, cyanosis, grade 2-3 edema   PSYCH: AAOx3  NEUROLOGY: non-focal  SKIN: No rashes or lesions    LABS:                        .   19.20 )-----------( 66       ( 07 May 2018 06:35 )             33.9         134<L>  |  90<L>  |  143<H>  ----------------------------<  94  5.4<H>   |  18<L>  |  4.55<H>    Ca    8.5      07 May 2018 06:35  Mg     3.2         TPro  6.4  /  Alb  3.5  /  TBili  2.6<H>  /  DBili  1.5<H>  /  AST  79<H>  /  ALT  53<H>  /  AlkPhos  158<H>      PT/INR - ( 07 May 2018 06:55 )   PT: 36.2 SEC;   INR: 3.08          PTT - ( 07 May 2018 06:55 )  PTT:34.9 SEC      Urinalysis Basic - ( 05 May 2018 18:23 )    Color: YELLOW / Appearance: TURBID / S.016 / pH: 5.5  Gluc: NEGATIVE / Ketone: NEGATIVE  / Bili: NEGATIVE / Urobili: NORMAL mg/dL   Blood: LARGE / Protein: 100 mg/dL / Nitrite: NEGATIVE   Leuk Esterase: LARGE / RBC: >50 / WBC >50   Sq Epi: OCC / Non Sq Epi: x / Bacteria: x        RADIOLOGY & ADDITIONAL TESTS:  < from: US Abdomen Complete (18 @ 10:44) >  FINDINGS:    Liver: The liver is sonographically normal. Small amount of perihepatic   ascites.    Bile ducts: Normal caliber. Common bile duct measures 2.9 mm.     Gallbladder: Cholelithiasis. The gallbladder wall is thickened. No   sonographic Jimenez's sign.    Pancreas: Visualized portions are within normal limits.    Spleen: 5.6 cm. the spleen is sonographically normal. Perisplenic ascites.    Right kidney: 8.7 x 3.8 x 4.4 cm. No hydronephrosis. No shadowing   calculi. The right kidney is echogenic.    Left kidney: 9.3 x 5.2 x 4.2 cm.  No hydronephrosis. No shadowing   calculi. The left kidney is echogenic.    Ascites: Small amount of ascites.    Aorta and IVC: Visualized portions are within normal limits.    Bladder: The bladder is incompletely distended.    IMPRESSION:     1.  Cholelithiasis with gallbladder wall thickening.  2.  Small amount of ascites.                  CASSIDY JULIO M.D., ATTENDING RADIOLOGIST  This document has been electronically signed. May  6 2018 12:30PM    < end of copied text >    Imaging Personally Reviewed:    Care Discussed with Consultants/Other Providers:

## 2018-05-07 NOTE — PROGRESS NOTE ADULT - SUBJECTIVE AND OBJECTIVE BOX
[facility] DIVISION OF KIDNEY DISEASES AND HYPERTENSION -- FOLLOW UP NOTE  --------------------------------------------------------------------------------  Chief Complaint:  JAKUB    24 hour events/subjective:  Patient reports still feeling well and tired.  Daughter at bedside providing additional history.        PAST HISTORY  --------------------------------------------------------------------------------  No significant changes to PMH, PSH, FHx, SHx, unless otherwise noted    ALLERGIES & MEDICATIONS  --------------------------------------------------------------------------------  Allergies    No Known Allergies    Intolerances    lisinopril (Other)    Standing Inpatient Medications  aspirin  chewable 81 milliGRAM(s) Oral daily  buDESOnide 160 MICROgram(s)/formoterol 4.5 MICROgram(s) Inhaler 2 Puff(s) Inhalation two times a day  docusate sodium 100 milliGRAM(s) Oral three times a day  ferrous    sulfate 325 milliGRAM(s) Oral daily  metoprolol tartrate 12.5 milliGRAM(s) Oral two times a day  nystatin Cream 1 Application(s) Topical two times a day  simvastatin 10 milliGRAM(s) Oral at bedtime  sodium polystyrene sulfonate Suspension 15 Gram(s) Oral once  warfarin 1 milliGRAM(s) Oral once    PRN Inpatient Medications  ALBUTerol/ipratropium for Nebulization 3 milliLiter(s) Nebulizer every 4 hours PRN      REVIEW OF SYSTEMS  --------------------------------------------------------------------------------  Gen: No fevers/chills, + fatigue, +malaise  Skin: No rashes  Head/Eyes/Ears/Mouth: No headache  Respiratory: No dyspnea  CV: No chest pain  GI: No abdominal pain  : No dysuria  MSK: +edema  Neuro: No dizziness/lightheadedness    VITALS/PHYSICAL EXAM  --------------------------------------------------------------------------------  T(C): 36.5 (05-07-18 @ 13:43), Max: 36.5 (05-07-18 @ 13:43)  HR: 60 (05-07-18 @ 13:43) (59 - 60)  BP: 115/60 (05-07-18 @ 13:43) (104/52 - 115/60)  RR: 19 (05-07-18 @ 13:43) (18 - 19)  SpO2: 93% (05-07-18 @ 13:43) (93% - 97%)  Wt(kg): --        Physical Exam:  	Gen: NAD  	HEENT: MMM  	Pulm: CTA B/L  	CV: RRR, S1S2; no rub  	Abd: +BS, soft, nontender/nondistended  	: No suprapubic tenderness  	UE:  no edema  	LE:  1+ pitting edema  	Neuro: No focal deficits  	Psych: +somnolent  	Skin: Warm, + tenting    LABS/STUDIES  --------------------------------------------------------------------------------              11.1   19.20 >-----------<  66       [05-07-18 @ 06:35]              33.9     134  |  90  |  143  ----------------------------<  94      [05-07-18 @ 06:35]  5.4   |  18  |  4.55        Ca     8.5     [05-07-18 @ 06:35]      Mg     3.2     [05-07-18 @ 06:35]    TPro  6.4  /  Alb  3.5  /  TBili  2.6  /  DBili  1.5  /  AST  79  /  ALT  53  /  AlkPhos  158  [05-07-18 @ 06:35]    PT/INR: PT 36.2 , INR 3.08       [05-07-18 @ 06:55]  PTT: 34.9       [05-07-18 @ 06:55]          [05-07-18 @ 06:35]    Creatinine Trend:  SCr 4.55 [05-07 @ 06:35]  SCr 4.58 [05-06 @ 08:27]  SCr 4.43 [05-05 @ 13:32]  SCr 4.32 [05-05 @ 03:20]  SCr 4.37 [05-04 @ 18:30]    Urinalysis - [05-05-18 @ 18:23]      Color YELLOW / Appearance TURBID / SG 1.016 / pH 5.5      Gluc NEGATIVE / Ketone NEGATIVE  / Bili NEGATIVE / Urobili NORMAL       Blood LARGE / Protein 100 / Leuk Est LARGE / Nitrite NEGATIVE      RBC >50 / WBC >50 / Hyaline 25-50 / Gran  / Sq Epi OCC / Non Sq Epi  / Bacteria     Urine Creatinine 140.18      [05-05-18 @ 18:23]  Urine Protein 108.8      [05-05-18 @ 18:23]  Urine Sodium 22      [05-05-18 @ 20:45]  Urine Urea Nitrogen 582.8      [05-05-18 @ 20:45]  Urine Potassium 63.2      [05-05-18 @ 20:45]  Urine Chloride < 20      [05-05-18 @ 20:45]  Urine Osmolality 399      [05-05-18 @ 20:45]

## 2018-05-07 NOTE — PROGRESS NOTE ADULT - PROBLEM SELECTOR PLAN 2
Hyperkalemia in the setting of JAKUB.  Would give fluid challenge,  increased sodium delivery to kidney likely to increase potassium output in urine.

## 2018-05-07 NOTE — PROGRESS NOTE ADULT - PROBLEM SELECTOR PLAN 2
Appreciate Renal consult- JAKUB on CKD  likely hemodynamically mediated in the setting of decreased PO intake, N/V and low BP readings.  other possibility is auto immune or vasuclitic process   Pt. with thrombocytopenia and hemolytic anemia, less likely TTP/TMA as per heme/onc as rare schistocytes noted   -- Normal renal U/S. FU further work up- complement, Vasculitis howard, Anti GBM, Ig levels, ANCA,MM howard

## 2018-05-08 LAB
ALBUMIN SERPL ELPH-MCNC: 3.4 G/DL — SIGNIFICANT CHANGE UP (ref 3.3–5)
ALP SERPL-CCNC: 160 U/L — HIGH (ref 40–120)
ALT FLD-CCNC: 56 U/L — HIGH (ref 4–33)
AST SERPL-CCNC: 89 U/L — HIGH (ref 4–32)
BASOPHILS # BLD AUTO: 0.01 K/UL — SIGNIFICANT CHANGE UP (ref 0–0.2)
BASOPHILS NFR BLD AUTO: 0.1 % — SIGNIFICANT CHANGE UP (ref 0–2)
BILIRUB SERPL-MCNC: 2.4 MG/DL — HIGH (ref 0.2–1.2)
BUN SERPL-MCNC: 154 MG/DL — HIGH (ref 7–23)
CALCIUM SERPL-MCNC: 8.4 MG/DL — SIGNIFICANT CHANGE UP (ref 8.4–10.5)
CHLORIDE SERPL-SCNC: 91 MMOL/L — LOW (ref 98–107)
CO2 SERPL-SCNC: 17 MMOL/L — LOW (ref 22–31)
CREAT SERPL-MCNC: 4.72 MG/DL — HIGH (ref 0.5–1.3)
EOSINOPHIL # BLD AUTO: 0.05 K/UL — SIGNIFICANT CHANGE UP (ref 0–0.5)
EOSINOPHIL NFR BLD AUTO: 0.5 % — SIGNIFICANT CHANGE UP (ref 0–6)
GLUCOSE SERPL-MCNC: 106 MG/DL — HIGH (ref 70–99)
HAV IGM SER-ACNC: NONREACTIVE — SIGNIFICANT CHANGE UP
HBV CORE AB SER-ACNC: REACTIVE — SIGNIFICANT CHANGE UP
HBV CORE IGM SER-ACNC: NONREACTIVE — SIGNIFICANT CHANGE UP
HBV SURFACE AG SER-ACNC: NONREACTIVE — SIGNIFICANT CHANGE UP
HCT VFR BLD CALC: 32.4 % — LOW (ref 34.5–45)
HCV AB S/CO SERPL IA: 0.09 S/CO — SIGNIFICANT CHANGE UP
HCV AB SERPL-IMP: SIGNIFICANT CHANGE UP
HGB BLD-MCNC: 10.4 G/DL — LOW (ref 11.5–15.5)
IMM GRANULOCYTES # BLD AUTO: 0.06 # — SIGNIFICANT CHANGE UP
IMM GRANULOCYTES NFR BLD AUTO: 0.5 % — SIGNIFICANT CHANGE UP (ref 0–1.5)
INR BLD: 4.27 — HIGH (ref 0.88–1.17)
KAPPA/LAMBDA FREE LIGHT CHAIN RATIO, SERUM: SIGNIFICANT CHANGE UP
LYMPHOCYTES # BLD AUTO: 0.68 K/UL — LOW (ref 1–3.3)
LYMPHOCYTES # BLD AUTO: 6.2 % — LOW (ref 13–44)
MCHC RBC-ENTMCNC: 32.1 % — SIGNIFICANT CHANGE UP (ref 32–36)
MCHC RBC-ENTMCNC: 32.8 PG — SIGNIFICANT CHANGE UP (ref 27–34)
MCV RBC AUTO: 102.2 FL — HIGH (ref 80–100)
MONOCYTES # BLD AUTO: 1.14 K/UL — HIGH (ref 0–0.9)
MONOCYTES NFR BLD AUTO: 10.3 % — SIGNIFICANT CHANGE UP (ref 2–14)
NEUTROPHILS # BLD AUTO: 9.08 K/UL — HIGH (ref 1.8–7.4)
NEUTROPHILS NFR BLD AUTO: 82.4 % — HIGH (ref 43–77)
NRBC # FLD: 0.7 — SIGNIFICANT CHANGE UP
NRBC FLD-RTO: 6.4 — SIGNIFICANT CHANGE UP
PLATELET # BLD AUTO: 59 K/UL — LOW (ref 150–400)
PMV BLD: SIGNIFICANT CHANGE UP FL (ref 7–13)
POTASSIUM SERPL-MCNC: 5 MMOL/L — SIGNIFICANT CHANGE UP (ref 3.5–5.3)
POTASSIUM SERPL-SCNC: 5 MMOL/L — SIGNIFICANT CHANGE UP (ref 3.5–5.3)
PROT SERPL-MCNC: 6.3 G/DL — SIGNIFICANT CHANGE UP (ref 6–8.3)
PROTHROM AB SERPL-ACNC: 48.8 SEC — HIGH (ref 9.8–13.1)
RBC # BLD: 3.17 M/UL — LOW (ref 3.8–5.2)
RBC # FLD: 24.1 % — HIGH (ref 10.3–14.5)
SODIUM SERPL-SCNC: 134 MMOL/L — LOW (ref 135–145)
SPECIMEN SOURCE: SIGNIFICANT CHANGE UP
SPECIMEN SOURCE: SIGNIFICANT CHANGE UP
WBC # BLD: 11.02 K/UL — HIGH (ref 3.8–10.5)
WBC # FLD AUTO: 11.02 K/UL — HIGH (ref 3.8–10.5)

## 2018-05-08 PROCEDURE — 99233 SBSQ HOSP IP/OBS HIGH 50: CPT | Mod: GC

## 2018-05-08 PROCEDURE — 88189 FLOWCYTOMETRY/READ 16 & >: CPT

## 2018-05-08 PROCEDURE — 99233 SBSQ HOSP IP/OBS HIGH 50: CPT

## 2018-05-08 RX ORDER — SODIUM CHLORIDE 9 MG/ML
250 INJECTION INTRAMUSCULAR; INTRAVENOUS; SUBCUTANEOUS ONCE
Qty: 0 | Refills: 0 | Status: COMPLETED | OUTPATIENT
Start: 2018-05-08 | End: 2018-05-08

## 2018-05-08 RX ADMIN — SODIUM CHLORIDE 250 MILLILITER(S): 9 INJECTION INTRAMUSCULAR; INTRAVENOUS; SUBCUTANEOUS at 01:48

## 2018-05-08 RX ADMIN — BUDESONIDE AND FORMOTEROL FUMARATE DIHYDRATE 2 PUFF(S): 160; 4.5 AEROSOL RESPIRATORY (INHALATION) at 09:44

## 2018-05-08 RX ADMIN — Medication 12.5 MILLIGRAM(S): at 21:23

## 2018-05-08 RX ADMIN — Medication 325 MILLIGRAM(S): at 11:24

## 2018-05-08 RX ADMIN — Medication 81 MILLIGRAM(S): at 11:24

## 2018-05-08 RX ADMIN — Medication 12.5 MILLIGRAM(S): at 05:46

## 2018-05-08 RX ADMIN — BUDESONIDE AND FORMOTEROL FUMARATE DIHYDRATE 2 PUFF(S): 160; 4.5 AEROSOL RESPIRATORY (INHALATION) at 21:15

## 2018-05-08 RX ADMIN — Medication 100 MILLIGRAM(S): at 21:15

## 2018-05-08 RX ADMIN — NYSTATIN CREAM 1 APPLICATION(S): 100000 CREAM TOPICAL at 05:47

## 2018-05-08 NOTE — PROGRESS NOTE ADULT - PROBLEM SELECTOR PLAN 1
Pt. with JAKBU on CKD. CKD likely in the setting of DM, HTN, CHF. JAKUB likely hemodynamically mediated in the setting of decreased PO intake, N/V and low BP readings. As per review on labs from [facility], pt. Scr ranges from 1.5-2 since 8/2017. Scr on admission was elevated at 4.3.  Patient taking bumex 1mg BID.  Based on urine studies and clinical exam, patient is likely intravascularly depleted - BP is soft, has tenting of skin with dry axilla - however fluid challenge given yesterday with worsening of renal function and increased rales on exam.  Possible Cardiorenal syndrome.  Consider TTE.  Will discuss starting high dose diuretics, bumex 3mg IV BID. Pt. with JAKUB on CKD. CKD likely in the setting of DM, HTN, CHF. JAKUB likely hemodynamically mediated in the setting of decreased PO intake, N/V and low BP readings. As per review on labs from [facility]/[place], pt. Scr ranges from 1.5-2 since 8/2017. Scr on admission was elevated at 4.3.  Patient taking bumex 1mg BID.  Based on urine studies and clinical exam, patient is likely intravascularly depleted - BP is soft, has tenting of skin with dry axilla - however fluid challenge given yesterday with worsening of renal function and increased rales on exam.  Possible Cardiorenal syndrome.  Consider TTE.

## 2018-05-08 NOTE — PROGRESS NOTE ADULT - PROBLEM SELECTOR PLAN 1
Send UA/UCx, BCx, CXR eval for source of infection.  Low clinical suspicion for acute cholecystitis but RUQ sono shows some mild GB thickening.  Leukocytosis improving.  Will continue to monitor the situation.

## 2018-05-08 NOTE — PROGRESS NOTE ADULT - SUBJECTIVE AND OBJECTIVE BOX
INTERVAL HPI/OVERNIGHT EVENTS:  Patient S&E at bedside. No o/n events, patient resting comfortably.  Reports weakness is improved but has poor appetite.  Otherwise no complaints.      VITAL SIGNS:  T(F): 97.3 (05-08-18 @ 05:44)  HR: 59 (05-08-18 @ 05:44)  BP: 102/46 (05-08-18 @ 05:44)  RR: 19 (05-08-18 @ 05:44)  SpO2: 95% (05-08-18 @ 05:44)  Wt(kg): --    PHYSICAL EXAM:  Constitutional: NAD  HEENT: PERRL, EOMI, MMM  Respiratory: CTA b/l anteriorly  Cardiovascular: RRR, systolic murmur in aortic region  Gastrointestinal: soft, NTND, no masses palpable, + BS, no hepatosplenomegaly  Extremities: +1 LE edema b/l  Neurological: AAOx2    MEDICATIONS  (STANDING):  aspirin  chewable 81 milliGRAM(s) Oral daily  buDESOnide 160 MICROgram(s)/formoterol 4.5 MICROgram(s) Inhaler 2 Puff(s) Inhalation two times a day  docusate sodium 100 milliGRAM(s) Oral three times a day  ferrous    sulfate 325 milliGRAM(s) Oral daily  metoprolol tartrate 12.5 milliGRAM(s) Oral two times a day  nystatin Cream 1 Application(s) Topical two times a day    MEDICATIONS  (PRN):  ALBUTerol/ipratropium for Nebulization 3 milliLiter(s) Nebulizer every 4 hours PRN Shortness of Breath and/or Wheezing    Allergies  No Known Allergies    Intolerances  lisinopril (Other)      LABS:                        10.4   11.02 )-----------( 59       ( 08 May 2018 06:40 )             32.4     05-08    134<L>  |  91<L>  |  154<H>  ----------------------------<  106<H>  5.0   |  17<L>  |  4.72<H>    Ca    8.4      08 May 2018 06:40  Mg     3.2     05-07    TPro  6.3  /  Alb  3.4  /  TBili  2.4<H>  /  DBili  x   /  AST  89<H>  /  ALT  56<H>  /  AlkPhos  160<H>  05-08    PT/INR - ( 08 May 2018 06:40 )   PT: 48.8 SEC;   INR: 4.27          PTT - ( 07 May 2018 06:55 )  PTT:34.9 SEC      RADIOLOGY & ADDITIONAL TESTS:  Studies reviewed.    ASSESSMENT & PLAN:

## 2018-05-08 NOTE — PROGRESS NOTE ADULT - PROBLEM SELECTOR PLAN 1
Macrocytic likely due to reticulocytosis.  B12, folate wnl, iron studies consistent with AOCD. Her --> 696, LDH from today pending. Hapto <20, t bili 2.1--> 2.6 (direct 1.5)-->2.4, coomb's neg.  Hgb stable but downtrending plts.  Will review peripheral smear today.  Previous PBS shwoed 0-1 schistocytes/hpf, one field w 4 schistocytes, otherwise increased reticulocytes, some target and kishor cells, one nucleated rbc, no teardrop cells, mature wbcs, one plt clump and decreased plt count, giant plts seen.   She does have a high retic count and mild hyperbilirubinemia (though mostly direct) which could signify chronic hemolysis 2/2 to bioprosthetic valve vs. TMA 2/2 underlying acute renal dx.  2D echo pending to r/o paravalvular leak causing hemolysis.  LFTs elevated, US with GB wall thickening, nontender on exam, no hepatosplenomegaly. Trend bilirubin daily, check retic count , ldh and haptoglobin daily.   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 UPEP, b2 microglobulin pending.  At this time, we do not feel she has TTP and no indication for PLEX at this time. We will continue to monitor.

## 2018-05-08 NOTE — PROGRESS NOTE ADULT - PROBLEM SELECTOR PLAN 2
Hyperkalemia in the setting of JAKUB.  Consider high dose diuretics. Hyperkalemia in the setting of JAKUB.  better today.

## 2018-05-08 NOTE — PROGRESS NOTE ADULT - SUBJECTIVE AND OBJECTIVE BOX
[facility] DIVISION OF KIDNEY DISEASES AND HYPERTENSION -- FOLLOW UP NOTE  --------------------------------------------------------------------------------  Chief Complaint:  JAKUB    24 hour events/subjective:  Patient endorses orthopnea and having to sleep inclined.  +LE edema.  Given small fluid challenge yesterday with worsening renal function.        PAST HISTORY  --------------------------------------------------------------------------------  No significant changes to PMH, PSH, FHx, SHx, unless otherwise noted    ALLERGIES & MEDICATIONS  --------------------------------------------------------------------------------  Allergies    No Known Allergies    Intolerances    lisinopril (Other)    Standing Inpatient Medications  aspirin  chewable 81 milliGRAM(s) Oral daily  buDESOnide 160 MICROgram(s)/formoterol 4.5 MICROgram(s) Inhaler 2 Puff(s) Inhalation two times a day  docusate sodium 100 milliGRAM(s) Oral three times a day  ezetimibe 10 milliGRAM(s) Oral daily  ferrous    sulfate 325 milliGRAM(s) Oral daily  metoprolol tartrate 12.5 milliGRAM(s) Oral two times a day  nystatin Cream 1 Application(s) Topical two times a day    PRN Inpatient Medications  ALBUTerol/ipratropium for Nebulization 3 milliLiter(s) Nebulizer every 4 hours PRN      REVIEW OF SYSTEMS  --------------------------------------------------------------------------------  Gen: No fevers/chills  Skin: No rashes  Head/Eyes/Ears/Mouth: No headache  Respiratory: +dyspnea  CV: No chest pain, + orthopnea  GI: No abdominal pain  : No dysuria  MSK: +edema  Neuro: No dizziness/lightheadedness    VITALS/PHYSICAL EXAM  --------------------------------------------------------------------------------  T(C): 36.3 (05-08-18 @ 05:44), Max: 36.6 (05-07-18 @ 21:22)  HR: 59 (05-08-18 @ 05:44) (59 - 60)  BP: 102/46 (05-08-18 @ 05:44) (102/46 - 115/60)  RR: 19 (05-08-18 @ 05:44) (18 - 19)  SpO2: 95% (05-08-18 @ 05:44) (93% - 98%)  Wt(kg): --        Physical Exam:  	Gen: NAD  	HEENT: MMM  	Pulm: +bibasilar rales  	CV: RRR, S1S2; no rub  	Abd: +BS, soft, nontender/nondistended  	: No suprapubic tenderness  	UE:  no edema  	LE:  +pitting edema bilaterally  	Neuro: No focal deficits  	Psych: Normal affect and mood  	Skin: Warm    LABS/STUDIES  --------------------------------------------------------------------------------              10.4   11.02 >-----------<  59       [05-08-18 @ 06:40]              32.4     134  |  91  |  154  ----------------------------<  106      [05-08-18 @ 06:40]  5.0   |  17  |  4.72        Ca     8.4     [05-08-18 @ 06:40]      Mg     3.2     [05-07-18 @ 06:35]    TPro  6.3  /  Alb  3.4  /  TBili  2.4  /  DBili  x   /  AST  89  /  ALT  56  /  AlkPhos  160  [05-08-18 @ 06:40]    PT/INR: PT 48.8 , INR 4.27       [05-08-18 @ 06:40]  PTT: 34.9       [05-07-18 @ 06:55]          [05-07-18 @ 06:35]    Creatinine Trend:  SCr 4.72 [05-08 @ 06:40]  SCr 4.55 [05-07 @ 06:35]  SCr 4.58 [05-06 @ 08:27]  SCr 4.43 [05-05 @ 13:32]  SCr 4.32 [05-05 @ 03:20]    Urine Creatinine 140.18      [05-05-18 @ 18:23]  Urine Protein 108.8      [05-05-18 @ 18:23]  Urine Sodium 22      [05-05-18 @ 20:45]  Urine Urea Nitrogen 582.8      [05-05-18 @ 20:45]  Urine Potassium 63.2      [05-05-18 @ 20:45]  Urine Chloride < 20      [05-05-18 @ 20:45]  Urine Osmolality 399      [05-05-18 @ 20:45]

## 2018-05-08 NOTE — PROGRESS NOTE ADULT - PROBLEM SELECTOR PLAN 2
Less likely DIC as d-dimer and fibrinogen wnl.  Has not received heparin here.  Medications reviewed and unlikely the cause of thrombocytopenia.  Continue to trend CBC daily.

## 2018-05-08 NOTE — PROGRESS NOTE ADULT - ATTENDING COMMENTS
89yo F w/ thrombocytopenia, mild anemia - reticulocytosis, elevated LDH, low hapto - with new JAKUB on CKD, transaminitis. She is on coumadin for afib, would recommend keeping INR 2-3 especially given thrombocytopenia and increased risk of bleeding. Check CT scans r/o malignancy, bleed. f/u flow. Check echo to assess valves.

## 2018-05-08 NOTE — PROGRESS NOTE ADULT - PROBLEM SELECTOR PLAN 2
Appreciate Renal consult- JAKUB on CKD  likely hemodynamically mediated in the setting of decreased PO intake, N/V and low BP readings. - Normal renal U/S.  Suspect cardiorenal syndrome.  F/U TTE for LV Fx eval.  Monitor off diuresis for now.

## 2018-05-08 NOTE — PROGRESS NOTE ADULT - SUBJECTIVE AND OBJECTIVE BOX
CC: F/U for weakness    SUBJECTIVE / OVERNIGHT EVENTS:  No overnight issues noted.  INR elevated even with very little dose of coumadin.  No F/C, N/V, CP, SOB, Cough, lightheadedness, dizziness, abdominal pain, diarrhea, dysuria.    MEDICATIONS  (STANDING):  aspirin  chewable 81 milliGRAM(s) Oral daily  buDESOnide 160 MICROgram(s)/formoterol 4.5 MICROgram(s) Inhaler 2 Puff(s) Inhalation two times a day  docusate sodium 100 milliGRAM(s) Oral three times a day  ferrous    sulfate 325 milliGRAM(s) Oral daily  metoprolol tartrate 12.5 milliGRAM(s) Oral two times a day  nystatin Cream 1 Application(s) Topical two times a day    MEDICATIONS  (PRN):  ALBUTerol/ipratropium for Nebulization 3 milliLiter(s) Nebulizer every 4 hours PRN Shortness of Breath and/or Wheezing      Vital Signs Last 24 Hrs  T(C): 36.3 (08 May 2018 05:44), Max: 36.6 (07 May 2018 21:22)  T(F): 97.3 (08 May 2018 05:44), Max: 97.8 (07 May 2018 21:22)  HR: 59 (08 May 2018 05:44) (59 - 60)  BP: 102/46 (08 May 2018 05:44) (102/46 - 111/54)  BP(mean): 66 (07 May 2018 17:30) (66 - 66)  RR: 19 (08 May 2018 05:44) (18 - 19)  SpO2: 95% (08 May 2018 05:44) (95% - 98%)  CAPILLARY BLOOD GLUCOSE        I&O's Summary      PHYSICAL EXAM:  GENERAL: NAD, well-developed  HEAD:  Atraumatic, Normocephalic  EYES: EOMI, PERRLA, conjunctiva and sclera clear  NECK: Supple, No JVD  CHEST/LUNG: Clear to auscultation bilaterally; No wheeze  HEART: Regular rate and rhythm; Loud S1, presence of holosytolic murmur at apex   ABDOMEN: Soft, Nontender, Nondistended; Bowel sounds present  EXTREMITIES:  2+ Peripheral Pulses, No clubbing, cyanosis, grade 2-3 edema   PSYCH: AAOx3  NEUROLOGY: non-focal  SKIN: No rashes or lesions    LABS:                        10.4   11.02 )-----------( 59       ( 08 May 2018 06:40 )             32.4     05-08    134<L>  |  91<L>  |  154<H>  ----------------------------<  106<H>  5.0   |  17<L>  |  4.72<H>    Ca    8.4      08 May 2018 06:40  Mg     3.2     05-07    TPro  6.3  /  Alb  3.4  /  TBili  2.4<H>  /  DBili  x   /  AST  89<H>  /  ALT  56<H>  /  AlkPhos  160<H>  05-08    PT/INR - ( 08 May 2018 06:40 )   PT: 48.8 SEC;   INR: 4.27          PTT - ( 07 May 2018 06:55 )  PTT:34.9 SEC          RADIOLOGY & ADDITIONAL TESTS:    Imaging Personally Reviewed:    Care Discussed with Consultants/Other Providers: Heme - Dr. Jones - hemolysis work up

## 2018-05-08 NOTE — PROGRESS NOTE ADULT - ATTENDING COMMENTS
consider a RHC to better evaluate her volume status. reinforced low K diet. Nepro if ok with RD( asked daughter to no give Ensure bc of high K). would continue to hold off diuretics for now consider a RHC to better evaluate her volume status. reinforced low K diet. Nepro if ok with RD( asked daughter to no give Ensure bc of high K). would continue to hold off diuretics for now. consider Midodrine for low BP.....?to increase perfusion

## 2018-05-09 DIAGNOSIS — E87.1 HYPO-OSMOLALITY AND HYPONATREMIA: ICD-10-CM

## 2018-05-09 LAB
-  AMIKACIN: SIGNIFICANT CHANGE UP
-  AMPICILLIN/SULBACTAM: SIGNIFICANT CHANGE UP
-  AMPICILLIN: SIGNIFICANT CHANGE UP
-  AZTREONAM: SIGNIFICANT CHANGE UP
-  CEFAZOLIN: SIGNIFICANT CHANGE UP
-  CEFEPIME: SIGNIFICANT CHANGE UP
-  CEFOXITIN: SIGNIFICANT CHANGE UP
-  CEFTAZIDIME: SIGNIFICANT CHANGE UP
-  CEFTRIAXONE: SIGNIFICANT CHANGE UP
-  CIPROFLOXACIN: SIGNIFICANT CHANGE UP
-  ERTAPENEM: SIGNIFICANT CHANGE UP
-  GENTAMICIN: SIGNIFICANT CHANGE UP
-  IMIPENEM: SIGNIFICANT CHANGE UP
-  LEVOFLOXACIN: SIGNIFICANT CHANGE UP
-  MEROPENEM: SIGNIFICANT CHANGE UP
-  NITROFURANTOIN: SIGNIFICANT CHANGE UP
-  PIPERACILLIN/TAZOBACTAM: SIGNIFICANT CHANGE UP
-  TIGECYCLINE: SIGNIFICANT CHANGE UP
-  TOBRAMYCIN: SIGNIFICANT CHANGE UP
-  TRIMETHOPRIM/SULFAMETHOXAZOLE: SIGNIFICANT CHANGE UP
ALBUMIN SERPL ELPH-MCNC: 3.3 G/DL — SIGNIFICANT CHANGE UP (ref 3.3–5)
ALP SERPL-CCNC: 154 U/L — HIGH (ref 40–120)
ALT FLD-CCNC: 60 U/L — HIGH (ref 4–33)
AST SERPL-CCNC: 92 U/L — HIGH (ref 4–32)
BACTERIA UR CULT: SIGNIFICANT CHANGE UP
BASOPHILS # BLD AUTO: 0.01 K/UL — SIGNIFICANT CHANGE UP (ref 0–0.2)
BASOPHILS NFR BLD AUTO: 0.1 % — SIGNIFICANT CHANGE UP (ref 0–2)
BILIRUB SERPL-MCNC: 2.6 MG/DL — HIGH (ref 0.2–1.2)
BUN SERPL-MCNC: 165 MG/DL — HIGH (ref 7–23)
CALCIUM SERPL-MCNC: 8.4 MG/DL — SIGNIFICANT CHANGE UP (ref 8.4–10.5)
CHLORIDE SERPL-SCNC: 86 MMOL/L — LOW (ref 98–107)
CO2 SERPL-SCNC: 14 MMOL/L — LOW (ref 22–31)
CREAT SERPL-MCNC: 4.64 MG/DL — HIGH (ref 0.5–1.3)
EOSINOPHIL # BLD AUTO: 0.07 K/UL — SIGNIFICANT CHANGE UP (ref 0–0.5)
EOSINOPHIL NFR BLD AUTO: 0.6 % — SIGNIFICANT CHANGE UP (ref 0–6)
GAS PNL BLDMV: SIGNIFICANT CHANGE UP
GLUCOSE SERPL-MCNC: 98 MG/DL — SIGNIFICANT CHANGE UP (ref 70–99)
HAPTOGLOB SERPL-MCNC: < 20 MG/DL — LOW (ref 34–200)
HCT VFR BLD CALC: 34.1 % — LOW (ref 34.5–45)
HGB BLD-MCNC: 11.2 G/DL — LOW (ref 11.5–15.5)
IMM GRANULOCYTES # BLD AUTO: 0.06 # — SIGNIFICANT CHANGE UP
IMM GRANULOCYTES NFR BLD AUTO: 0.5 % — SIGNIFICANT CHANGE UP (ref 0–1.5)
INR BLD: 3.97 — HIGH (ref 0.88–1.17)
LDH SERPL L TO P-CCNC: 895 U/L — HIGH (ref 135–225)
LYMPHOCYTES # BLD AUTO: 0.53 K/UL — LOW (ref 1–3.3)
LYMPHOCYTES # BLD AUTO: 4.5 % — LOW (ref 13–44)
MCHC RBC-ENTMCNC: 32.8 % — SIGNIFICANT CHANGE UP (ref 32–36)
MCHC RBC-ENTMCNC: 33 PG — SIGNIFICANT CHANGE UP (ref 27–34)
MCV RBC AUTO: 100.6 FL — HIGH (ref 80–100)
METHOD TYPE: SIGNIFICANT CHANGE UP
MONOCYTES # BLD AUTO: 1.2 K/UL — HIGH (ref 0–0.9)
MONOCYTES NFR BLD AUTO: 10.2 % — SIGNIFICANT CHANGE UP (ref 2–14)
NEUTROPHILS # BLD AUTO: 9.92 K/UL — HIGH (ref 1.8–7.4)
NEUTROPHILS NFR BLD AUTO: 84.1 % — HIGH (ref 43–77)
NRBC # FLD: 0.75 — SIGNIFICANT CHANGE UP
NRBC FLD-RTO: 6.4 — SIGNIFICANT CHANGE UP
ORGANISM # SPEC MICROSCOPIC CNT: SIGNIFICANT CHANGE UP
ORGANISM # SPEC MICROSCOPIC CNT: SIGNIFICANT CHANGE UP
PLATELET # BLD AUTO: 51 K/UL — LOW (ref 150–400)
PMV BLD: SIGNIFICANT CHANGE UP FL (ref 7–13)
POTASSIUM SERPL-MCNC: 4.9 MMOL/L — SIGNIFICANT CHANGE UP (ref 3.5–5.3)
POTASSIUM SERPL-SCNC: 4.9 MMOL/L — SIGNIFICANT CHANGE UP (ref 3.5–5.3)
PROT SERPL-MCNC: 6.3 G/DL — SIGNIFICANT CHANGE UP (ref 6–8.3)
PROTHROM AB SERPL-ACNC: 46.9 SEC — HIGH (ref 9.8–13.1)
RBC # BLD: 3.39 M/UL — LOW (ref 3.8–5.2)
RBC # FLD: 24.5 % — HIGH (ref 10.3–14.5)
SODIUM SERPL-SCNC: 131 MMOL/L — LOW (ref 135–145)
WBC # BLD: 11.79 K/UL — HIGH (ref 3.8–10.5)
WBC # FLD AUTO: 11.79 K/UL — HIGH (ref 3.8–10.5)

## 2018-05-09 PROCEDURE — 99233 SBSQ HOSP IP/OBS HIGH 50: CPT

## 2018-05-09 PROCEDURE — 74176 CT ABD & PELVIS W/O CONTRAST: CPT | Mod: 26

## 2018-05-09 PROCEDURE — 71250 CT THORAX DX C-: CPT | Mod: 26

## 2018-05-09 RX ADMIN — Medication 100 MILLIGRAM(S): at 21:33

## 2018-05-09 RX ADMIN — NYSTATIN CREAM 1 APPLICATION(S): 100000 CREAM TOPICAL at 05:26

## 2018-05-09 RX ADMIN — Medication 100 MILLIGRAM(S): at 05:27

## 2018-05-09 RX ADMIN — Medication 100 MILLIGRAM(S): at 13:01

## 2018-05-09 RX ADMIN — Medication 325 MILLIGRAM(S): at 12:23

## 2018-05-09 RX ADMIN — BUDESONIDE AND FORMOTEROL FUMARATE DIHYDRATE 2 PUFF(S): 160; 4.5 AEROSOL RESPIRATORY (INHALATION) at 12:22

## 2018-05-09 RX ADMIN — Medication 12.5 MILLIGRAM(S): at 05:26

## 2018-05-09 RX ADMIN — BUDESONIDE AND FORMOTEROL FUMARATE DIHYDRATE 2 PUFF(S): 160; 4.5 AEROSOL RESPIRATORY (INHALATION) at 21:33

## 2018-05-09 RX ADMIN — Medication 81 MILLIGRAM(S): at 12:23

## 2018-05-09 RX ADMIN — Medication 12.5 MILLIGRAM(S): at 18:53

## 2018-05-09 NOTE — PROGRESS NOTE ADULT - SUBJECTIVE AND OBJECTIVE BOX
[facility] DIVISION OF KIDNEY DISEASES AND HYPERTENSION -- FOLLOW UP NOTE  --------------------------------------------------------------------------------  Chief Complaint:  JAKUB on CKD    24 hour events/subjective:  Patient reports continued fatigue, denies any orthopnea, shortness of breath.        PAST HISTORY  --------------------------------------------------------------------------------  No significant changes to PMH, PSH, FHx, SHx, unless otherwise noted    ALLERGIES & MEDICATIONS  --------------------------------------------------------------------------------  Allergies    No Known Allergies    Intolerances    lisinopril (Other)    Standing Inpatient Medications  aspirin  chewable 81 milliGRAM(s) Oral daily  buDESOnide 160 MICROgram(s)/formoterol 4.5 MICROgram(s) Inhaler 2 Puff(s) Inhalation two times a day  docusate sodium 100 milliGRAM(s) Oral three times a day  ferrous    sulfate 325 milliGRAM(s) Oral daily  metoprolol tartrate 12.5 milliGRAM(s) Oral two times a day  nystatin Cream 1 Application(s) Topical two times a day    PRN Inpatient Medications  ALBUTerol/ipratropium for Nebulization 3 milliLiter(s) Nebulizer every 4 hours PRN      REVIEW OF SYSTEMS  --------------------------------------------------------------------------------  Gen: No fevers/chills  Skin: No rashes  Head/Eyes/Ears/Mouth: No headache  Respiratory: No dyspnea  CV: No chest pain  GI: No abdominal pain  : No dysuria  MSK: No edema  Neuro: No dizziness/lightheadedness    VITALS/PHYSICAL EXAM  --------------------------------------------------------------------------------  T(C): 36.3 (05-09-18 @ 14:56), Max: 36.3 (05-08-18 @ 20:18)  HR: 59 (05-09-18 @ 14:56) (59 - 63)  BP: 105/48 (05-09-18 @ 14:56) (99/55 - 114/52)  RR: 18 (05-09-18 @ 14:56) (18 - 18)  SpO2: 99% (05-09-18 @ 14:56) (99% - 100%)  Wt(kg): --        05-08-18 @ 07:01  -  05-09-18 @ 07:00  --------------------------------------------------------  IN: 400 mL / OUT: 0 mL / NET: 400 mL      Physical Exam:  	Gen: NAD  	HEENT: MMM  	Pulm: CTA B/L  	CV: RRR, S1S2; no rub  	Abd: soft, nontender  	: No suprapubic tenderness  	UE:  no edema  	LE:  1+ pitting edema  	Neuro: lethargic  	Psych: somnolent  	Skin: Warm    LABS/STUDIES  --------------------------------------------------------------------------------              11.2   11.79 >-----------<  51       [05-09-18 @ 06:04]              34.1     131  |  86  |  165  ----------------------------<  98      [05-09-18 @ 06:04]  4.9   |  14  |  4.64        Ca     8.4     [05-09-18 @ 06:04]    TPro  6.3  /  Alb  3.3  /  TBili  2.6  /  DBili  x   /  AST  92  /  ALT  60  /  AlkPhos  154  [05-09-18 @ 06:04]    PT/INR: PT 46.9 , INR 3.97       [05-09-18 @ 06:04]          [05-09-18 @ 06:04]    Creatinine Trend:  SCr 4.64 [05-09 @ 06:04]  SCr 4.72 [05-08 @ 06:40]  SCr 4.55 [05-07 @ 06:35]  SCr 4.58 [05-06 @ 08:27]  SCr 4.43 [05-05 @ 13:32] [facility] DIVISION OF KIDNEY DISEASES AND HYPERTENSION -- FOLLOW UP NOTE  --------------------------------------------------------------------------------  Chief Complaint:  JAKUB on CKD    24 hour events/subjective:  Patient reports continued fatigue, denies any orthopnea, shortness of breath.        PAST HISTORY  --------------------------------------------------------------------------------  No significant changes to PMH, PSH, FHx, SHx, unless otherwise noted    ALLERGIES & MEDICATIONS  --------------------------------------------------------------------------------  Allergies    No Known Allergies    Intolerances    lisinopril (Other)    Standing Inpatient Medications  aspirin  chewable 81 milliGRAM(s) Oral daily  buDESOnide 160 MICROgram(s)/formoterol 4.5 MICROgram(s) Inhaler 2 Puff(s) Inhalation two times a day  docusate sodium 100 milliGRAM(s) Oral three times a day  ferrous    sulfate 325 milliGRAM(s) Oral daily  metoprolol tartrate 12.5 milliGRAM(s) Oral two times a day  nystatin Cream 1 Application(s) Topical two times a day    PRN Inpatient Medications  ALBUTerol/ipratropium for Nebulization 3 milliLiter(s) Nebulizer every 4 hours PRN      --------------------------------------------------------------------------------  VITALS/PHYSICAL EXAM  --------------------------------------------------------------------------------  T(C): 36.3 (05-09-18 @ 14:56), Max: 36.3 (05-08-18 @ 20:18)  HR: 59 (05-09-18 @ 14:56) (59 - 63)  BP: 105/48 (05-09-18 @ 14:56) (99/55 - 114/52)  RR: 18 (05-09-18 @ 14:56) (18 - 18)  SpO2: 99% (05-09-18 @ 14:56) (99% - 100%)  Wt(kg): --        05-08-18 @ 07:01  -  05-09-18 @ 07:00  --------------------------------------------------------  IN: 400 mL / OUT: 0 mL / NET: 400 mL      Physical Exam:  	Gen: NAD  	HEENT: MMM  	Pulm: CTA B/L  	CV: RRR, S1S2; no rub  	Abd: soft, nontender  	: No suprapubic tenderness  	UE:  no edema  	LE:  1+ pitting edema  	Neuro: lethargic  	Psych: somnolent  	Skin: Warm    LABS/STUDIES  --------------------------------------------------------------------------------              11.2   11.79 >-----------<  51       [05-09-18 @ 06:04]              34.1     131  |  86  |  165  ----------------------------<  98      [05-09-18 @ 06:04]  4.9   |  14  |  4.64        Ca     8.4     [05-09-18 @ 06:04]    TPro  6.3  /  Alb  3.3  /  TBili  2.6  /  DBili  x   /  AST  92  /  ALT  60  /  AlkPhos  154  [05-09-18 @ 06:04]    PT/INR: PT 46.9 , INR 3.97       [05-09-18 @ 06:04]          [05-09-18 @ 06:04]    Creatinine Trend:  SCr 4.64 [05-09 @ 06:04]  SCr 4.72 [05-08 @ 06:40]  SCr 4.55 [05-07 @ 06:35]  SCr 4.58 [05-06 @ 08:27]  SCr 4.43 [05-05 @ 13:32]

## 2018-05-09 NOTE — PROGRESS NOTE ADULT - SUBJECTIVE AND OBJECTIVE BOX
CC: F/U for thrombocytopenia    SUBJECTIVE / OVERNIGHT EVENTS:  Continues to complain of weakness but no specific new complaints today.  No F/C, N/V, CP, SOB, Cough, lightheadedness, dizziness, abdominal pain, diarrhea, dysuria.    MEDICATIONS  (STANDING):  aspirin  chewable 81 milliGRAM(s) Oral daily  buDESOnide 160 MICROgram(s)/formoterol 4.5 MICROgram(s) Inhaler 2 Puff(s) Inhalation two times a day  docusate sodium 100 milliGRAM(s) Oral three times a day  ferrous    sulfate 325 milliGRAM(s) Oral daily  metoprolol tartrate 12.5 milliGRAM(s) Oral two times a day  nystatin Cream 1 Application(s) Topical two times a day    MEDICATIONS  (PRN):  ALBUTerol/ipratropium for Nebulization 3 milliLiter(s) Nebulizer every 4 hours PRN Shortness of Breath and/or Wheezing      Vital Signs Last 24 Hrs  T(C): 36.2 (09 May 2018 05:23), Max: 36.3 (08 May 2018 14:29)  T(F): 97.2 (09 May 2018 05:23), Max: 97.3 (08 May 2018 14:29)  HR: 63 (09 May 2018 05:23) (59 - 67)  BP: 114/52 (09 May 2018 05:23) (99/55 - 114/52)  BP(mean): --  RR: 18 (09 May 2018 05:23) (18 - 18)  SpO2: 100% (09 May 2018 05:23) (97% - 100%)  CAPILLARY BLOOD GLUCOSE        I&O's Summary    08 May 2018 07:01  -  09 May 2018 07:00  --------------------------------------------------------  IN: 400 mL / OUT: 0 mL / NET: 400 mL        PHYSICAL EXAM:  GENERAL: NAD, well-developed  HEAD:  Atraumatic, Normocephalic  EYES: EOMI, PERRLA, conjunctiva and sclera clear  NECK: Supple, No JVD  CHEST/LUNG: Clear to auscultation bilaterally; No wheeze  HEART: Regular rate and rhythm; Loud S1, presence of holosytolic murmur at apex   ABDOMEN: Soft, Nontender, Nondistended; Bowel sounds present  EXTREMITIES:  2+ Peripheral Pulses, No clubbing, cyanosis, grade 2-3 edema   PSYCH: AAOx3  NEUROLOGY: non-focal  SKIN: No rashes or lesions    LABS:                        11.2   11.79 )-----------( 51       ( 09 May 2018 06:04 )             34.1     05-09    131<L>  |  86<L>  |  165<H>  ----------------------------<  98  4.9   |  14<L>  |  4.64<H>    Ca    8.4      09 May 2018 06:04    TPro  6.3  /  Alb  3.3  /  TBili  2.6<H>  /  DBili  x   /  AST  92<H>  /  ALT  60<H>  /  AlkPhos  154<H>  05-09    PT/INR - ( 09 May 2018 06:04 )   PT: 46.9 SEC;   INR: 3.97        Acute Hepatitis Panel (05.08.18 @ 11:13)    Hepatitis C Virus S/CO Ratio: 0.09 S/CO    Hepatitis C Virus Interpretation: Nonreactive Hepatitis C AB  S/CO Ratio                        Interpretation  < 1.0                                     Non-Reactive  1.0 - 4.9                           Weakly-Reactive  > 5.0                                 Reactive  Non-Reactive: [place] with a non-reactive HCV antibody  result is considered uninfected.  No further action is  needed unless recent infection is suspected.  In these  cases, consider repeat testing later to detect  seroconversion..  Weakly-Reactive: HCV antibody test is abnormal, HCV RNA  Qualitative test will follow.  Reactive: HCV antibody test is abnormal, HCV RNA  Qualitative test will follow.  Note: HCV antibody testing is performed on the [employer]   system.    Hepatitis B Core IgM Antibody: Nonreactive    Hepatitis B Surface Antigen: Nonreactive    Hepatitis A IgM Antibody: Nonreactive                RADIOLOGY & ADDITIONAL TESTS:    Imaging Personally Reviewed:    Care Discussed with Consultants/Other Providers: Dr. Olsen - Renal - JAKUB on CKD. Dr. Jones - Hematology - thrombocytopenia, hemolytic anemia

## 2018-05-09 NOTE — PROGRESS NOTE ADULT - PROBLEM SELECTOR PLAN 1
Pt. with JAKUB on CKD. CKD likely in the setting of DM, HTN, CHF. JAKUB likely hemodynamically mediated in the setting of decreased PO intake, N/V and low BP readings. [facility], pt. Scr ranges from 1.5-2 since 8/2017. Scr on admission was elevated at 4.3.  Possible Cardiorenal syndrome.  Consider TTE.  BUN rising out of proportion of renal function, unlikely to be worsening pre-renal failure without rise in creatinine.

## 2018-05-09 NOTE — PROGRESS NOTE ADULT - PROBLEM SELECTOR PLAN 1
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 TMA 2/2 underlying autoimmune dx given acute renal failure.  pt also with macroytosis and elevated retic count  Coomb negative,B12 WNL , Iron studies cw AOCD  Awaiting TTE to evaluate for possible kwan-valvular leak as possible source.  CT of C/A/P for elevated LDH and malignancy work-up.

## 2018-05-09 NOTE — PROGRESS NOTE ADULT - PROBLEM SELECTOR PLAN 2
- Patient with low platelets of unclear etiology,   DW Heme- not likely HIT as pt not received heparin here.  Medications unlikely the cause of thrombocytopenia. Less likely DIC as fibrinogen WNL.

## 2018-05-09 NOTE — PROGRESS NOTE ADULT - ATTENDING COMMENTS
patient evaluated and examined with renal team earlier today. she has out of proportion increase in her BUN in the setting of hemolysis picture which highly suspicious for RBC destruction , or occult GIB. this was discussed with Dr. Bridges this morning and diagnostic workup is in progress.

## 2018-05-10 LAB
ALBUMIN SERPL ELPH-MCNC: 3.4 G/DL — SIGNIFICANT CHANGE UP (ref 3.3–5)
ALP SERPL-CCNC: 151 U/L — HIGH (ref 40–120)
ALT FLD-CCNC: 62 U/L — HIGH (ref 4–33)
AST SERPL-CCNC: 91 U/L — HIGH (ref 4–32)
BASOPHILS # BLD AUTO: 0.01 K/UL — SIGNIFICANT CHANGE UP (ref 0–0.2)
BASOPHILS NFR BLD AUTO: 0.1 % — SIGNIFICANT CHANGE UP (ref 0–2)
BILIRUB DIRECT SERPL-MCNC: 2.1 MG/DL — HIGH (ref 0.1–0.2)
BILIRUB SERPL-MCNC: 3 MG/DL — HIGH (ref 0.2–1.2)
BUN SERPL-MCNC: 159 MG/DL — HIGH (ref 7–23)
CALCIUM SERPL-MCNC: 8.6 MG/DL — SIGNIFICANT CHANGE UP (ref 8.4–10.5)
CHLORIDE SERPL-SCNC: 89 MMOL/L — LOW (ref 98–107)
CO2 SERPL-SCNC: 17 MMOL/L — LOW (ref 22–31)
CREAT SERPL-MCNC: 4.52 MG/DL — HIGH (ref 0.5–1.3)
EOSINOPHIL # BLD AUTO: 0.07 K/UL — SIGNIFICANT CHANGE UP (ref 0–0.5)
EOSINOPHIL NFR BLD AUTO: 0.6 % — SIGNIFICANT CHANGE UP (ref 0–6)
GLUCOSE SERPL-MCNC: 105 MG/DL — HIGH (ref 70–99)
HAPTOGLOB SERPL-MCNC: < 20 MG/DL — LOW (ref 34–200)
HCT VFR BLD CALC: 32.9 % — LOW (ref 34.5–45)
HGB BLD-MCNC: 10.9 G/DL — LOW (ref 11.5–15.5)
IMM GRANULOCYTES # BLD AUTO: 0.05 # — SIGNIFICANT CHANGE UP
IMM GRANULOCYTES NFR BLD AUTO: 0.4 % — SIGNIFICANT CHANGE UP (ref 0–1.5)
INR BLD: 3.99 — HIGH (ref 0.88–1.17)
LDH SERPL L TO P-CCNC: 874 U/L — HIGH (ref 135–225)
LYMPHOCYTES # BLD AUTO: 0.46 K/UL — LOW (ref 1–3.3)
LYMPHOCYTES # BLD AUTO: 3.8 % — LOW (ref 13–44)
MAGNESIUM SERPL-MCNC: 3.4 MG/DL — HIGH (ref 1.6–2.6)
MCHC RBC-ENTMCNC: 32.8 PG — SIGNIFICANT CHANGE UP (ref 27–34)
MCHC RBC-ENTMCNC: 33.1 % — SIGNIFICANT CHANGE UP (ref 32–36)
MCV RBC AUTO: 99.1 FL — SIGNIFICANT CHANGE UP (ref 80–100)
MONOCYTES # BLD AUTO: 1.01 K/UL — HIGH (ref 0–0.9)
MONOCYTES NFR BLD AUTO: 8.4 % — SIGNIFICANT CHANGE UP (ref 2–14)
NEUTROPHILS # BLD AUTO: 10.42 K/UL — HIGH (ref 1.8–7.4)
NEUTROPHILS NFR BLD AUTO: 86.7 % — HIGH (ref 43–77)
NRBC # FLD: 0.54 — SIGNIFICANT CHANGE UP
NRBC FLD-RTO: 4.5 — SIGNIFICANT CHANGE UP
OB PNL STL: POSITIVE — SIGNIFICANT CHANGE UP
PLATELET # BLD AUTO: 81 K/UL — LOW (ref 150–400)
PMV BLD: SIGNIFICANT CHANGE UP FL (ref 7–13)
POTASSIUM SERPL-MCNC: 4.9 MMOL/L — SIGNIFICANT CHANGE UP (ref 3.5–5.3)
POTASSIUM SERPL-SCNC: 4.9 MMOL/L — SIGNIFICANT CHANGE UP (ref 3.5–5.3)
PROT SERPL-MCNC: 6.1 G/DL — SIGNIFICANT CHANGE UP (ref 6–8.3)
PROTHROM AB SERPL-ACNC: 45.6 SEC — HIGH (ref 9.8–13.1)
RBC # BLD: 3.32 M/UL — LOW (ref 3.8–5.2)
RBC # FLD: 25.2 % — HIGH (ref 10.3–14.5)
SODIUM SERPL-SCNC: 132 MMOL/L — LOW (ref 135–145)
WBC # BLD: 12.02 K/UL — HIGH (ref 3.8–10.5)
WBC # FLD AUTO: 12.02 K/UL — HIGH (ref 3.8–10.5)

## 2018-05-10 PROCEDURE — 99233 SBSQ HOSP IP/OBS HIGH 50: CPT

## 2018-05-10 PROCEDURE — 99233 SBSQ HOSP IP/OBS HIGH 50: CPT | Mod: GC

## 2018-05-10 RX ADMIN — BUDESONIDE AND FORMOTEROL FUMARATE DIHYDRATE 2 PUFF(S): 160; 4.5 AEROSOL RESPIRATORY (INHALATION) at 11:49

## 2018-05-10 RX ADMIN — NYSTATIN CREAM 1 APPLICATION(S): 100000 CREAM TOPICAL at 05:41

## 2018-05-10 RX ADMIN — BUDESONIDE AND FORMOTEROL FUMARATE DIHYDRATE 2 PUFF(S): 160; 4.5 AEROSOL RESPIRATORY (INHALATION) at 22:16

## 2018-05-10 RX ADMIN — NYSTATIN CREAM 1 APPLICATION(S): 100000 CREAM TOPICAL at 17:24

## 2018-05-10 RX ADMIN — Medication 325 MILLIGRAM(S): at 11:49

## 2018-05-10 RX ADMIN — Medication 81 MILLIGRAM(S): at 11:49

## 2018-05-10 RX ADMIN — Medication 12.5 MILLIGRAM(S): at 17:24

## 2018-05-10 RX ADMIN — Medication 100 MILLIGRAM(S): at 11:49

## 2018-05-10 RX ADMIN — Medication 100 MILLIGRAM(S): at 05:41

## 2018-05-10 RX ADMIN — Medication 12.5 MILLIGRAM(S): at 05:41

## 2018-05-10 RX ADMIN — Medication 100 MILLIGRAM(S): at 22:16

## 2018-05-10 NOTE — PROGRESS NOTE ADULT - SUBJECTIVE AND OBJECTIVE BOX
CC: F/U for JAKUB and thrombocytopenia and anemia    SUBJECTIVE / OVERNIGHT EVENTS:  Offers no new complaints.  No F/C, N/V, CP, SOB, Cough, lightheadedness, dizziness, abdominal pain, diarrhea, dysuria.    MEDICATIONS  (STANDING):  aspirin  chewable 81 milliGRAM(s) Oral daily  buDESOnide 160 MICROgram(s)/formoterol 4.5 MICROgram(s) Inhaler 2 Puff(s) Inhalation two times a day  docusate sodium 100 milliGRAM(s) Oral three times a day  ferrous    sulfate 325 milliGRAM(s) Oral daily  metoprolol tartrate 12.5 milliGRAM(s) Oral two times a day  nystatin Cream 1 Application(s) Topical two times a day    MEDICATIONS  (PRN):  ALBUTerol/ipratropium for Nebulization 3 milliLiter(s) Nebulizer every 4 hours PRN Shortness of Breath and/or Wheezing      Vital Signs Last 24 Hrs  T(C): 36.3 (10 May 2018 05:40), Max: 36.4 (09 May 2018 20:00)  T(F): 97.3 (10 May 2018 05:40), Max: 97.6 (09 May 2018 20:00)  HR: 60 (10 May 2018 09:45) (59 - 60)  BP: 115/59 (10 May 2018 09:45) (105/48 - 117/58)  BP(mean): --  RR: 18 (10 May 2018 09:45) (16 - 18)  SpO2: 99% (10 May 2018 09:45) (96% - 99%)  CAPILLARY BLOOD GLUCOSE        I&O's Summary    09 May 2018 07:01  -  10 May 2018 07:00  --------------------------------------------------------  IN: 436 mL / OUT: 0 mL / NET: 436 mL        PHYSICAL EXAM:  GENERAL: NAD, well-developed  HEAD:  Atraumatic, Normocephalic  EYES: EOMI, PERRLA, conjunctiva and sclera clear  NECK: Supple, No JVD  CHEST/LUNG: Clear to auscultation bilaterally; No wheeze  HEART: Regular rate and rhythm; Loud S1, presence of holosytolic murmur at apex   ABDOMEN: Soft, Nontender, Nondistended; Bowel sounds present  EXTREMITIES:  2+ Peripheral Pulses, No clubbing, cyanosis, grade 2-3 edema   PSYCH: AAOx3  NEUROLOGY: non-focal  SKIN: No rashes or lesions    LABS:                        10.9   12.02 )-----------( 81       ( 10 May 2018 06:40 )             32.9     05-10    132<L>  |  89<L>  |  159<H>  ----------------------------<  105<H>  4.9   |  17<L>  |  4.52<H>    Ca    8.6      10 May 2018 06:40  Mg     3.4     05-10    TPro  6.1  /  Alb  3.4  /  TBili  3.0<H>  /  DBili  2.1<H>  /  AST  91<H>  /  ALT  62<H>  /  AlkPhos  151<H>  05-10    PT/INR - ( 10 May 2018 06:40 )   PT: 45.6 SEC;   INR: 3.99                    RADIOLOGY & ADDITIONAL TESTS:    Imaging Personally Reviewed:    Care Discussed with Consultants/Other Providers: Dr. Olsen - Renal - JAKUB

## 2018-05-10 NOTE — PROGRESS NOTE ADULT - PROBLEM SELECTOR PLAN 1
Pt. with JAKUB on CKD. CKD likely in the setting of DM, HTN, CHF. JAKUB likely hemodynamically mediated in the setting of decreased PO intake, N/V and low BP readings. As per review on labs [facility], pt. Scr ranges from 1.5-2 since 8/2017. Scr on admission was elevated at 4.3.  Unlikely TTP per heme/onc.  TMA still possible, but complements WNL.  Biopsy would be needed to assess for antibody mediated damage, but given patient's advanced CKD prior, unlikely to .

## 2018-05-10 NOTE — PROGRESS NOTE ADULT - ATTENDING COMMENTS
Ms. Grove was seen in followup by the hematology consultation service today. Laboratory values were reviewed. The peripheral blood smear was reviewed as well. This elderly woman with multiple comorbidities as anemia and thrombocytopenia. She has a hemolytic anemia which is Felix negative. The peripheral blood smear reveals significant polychromasia and the reticulocyte count is more than 20%. There are no findings of abnormal white blood cells in the peripheral blood smear. There are nucleated red cells present. There are red cell morphology abnormalities as well, and these may be secondary to the bioprosthetic heart valves. We do not believe this to be thrombotic thrombocytopenic purpura. The hemolytic anemia is not explained. She did have a CT scan looking for the possibility of a malignancy. This was negative. Her daughter was at the bedside and we discussed her case with her. We propose the possibility of a bone marrow aspiration and biopsy to further clarify the reason for her hemolytic anemia and thrombocytopenia. Her daughter does not want any further investigation into this, and we discussed comfort care and discussed hospice care/palliative care.    Her daughter has had experience with hospice before as her father  on hospice in  with cancer. All questions were answered to the best of our ability and to her apparent satisfaction.

## 2018-05-10 NOTE — PROGRESS NOTE ADULT - PROBLEM SELECTOR PLAN 1
Anemia with thrombocytopenia, low suspicion for TTP, labs not consistent with DIC and has not received heparin during the admission.  She was also worked up for MM given her renal failure and SPEP showing polyclonality.  She still has hemolysis and could be from her bioprosthetic valve in which 2D echo is being pursued.  In reviewing her PBS, there are nucleated rbcs, few tear drops, polychromasia, normal wbcs, low plt count.  We are concerned about an underlying BM disorder such as MDS and discussed this with her daughter at bedside.  To diagnose this, she would likely need a BM biopsy which the daughter did not want to pursue at this time.  She expressed she would like to go the palliative route and have her comfortable at home.  Recommend palliative care consult for goals of care. Anemia with thrombocytopenia, low suspicion for TTP, labs not consistent with DIC and has not received heparin during the admission.  She was also worked up for MM given her renal failure which was negative (SPEP showed polyclonality). 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  In reviewing her PBS, there are nucleated rbcs, few tear drops, polychromasia, normal wbcs, low plt count.  We are concerned about an underlying BM disorder such as MDS and discussed this with her daughter at bedside.  To diagnose this, she would likely need a BM biopsy which the daughter did not want to pursue at this time.  She expressed she would like to go the palliative route and have her comfortable at home.  Recommend palliative care consult for goals of care.

## 2018-05-10 NOTE — PROGRESS NOTE ADULT - PROBLEM SELECTOR PLAN 1
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 TMA 2/2 underlying autoimmune dx given acute renal failure.  pt also with macroytosis and elevated retic count  Coomb negative,B12 WNL , Iron studies cw AOCD  Awaiting TTE to evaluate for possible kwan-valvular leak as possible source.  CT of C/A/P - awaiting official report - but no obvious source of malignancy or infection noted.

## 2018-05-10 NOTE — PROGRESS NOTE ADULT - SUBJECTIVE AND OBJECTIVE BOX
[facility] DIVISION OF KIDNEY DISEASES AND HYPERTENSION -- FOLLOW UP NOTE  --------------------------------------------------------------------------------  Chief Complaint:  JAKUB on CKD    24 hour events/subjective:  Patient denies any complaints at this time.        PAST HISTORY  --------------------------------------------------------------------------------  No significant changes to PMH, PSH, FHx, SHx, unless otherwise noted    ALLERGIES & MEDICATIONS  --------------------------------------------------------------------------------  Allergies    No Known Allergies    Intolerances    lisinopril (Other)    Standing Inpatient Medications  aspirin  chewable 81 milliGRAM(s) Oral daily  buDESOnide 160 MICROgram(s)/formoterol 4.5 MICROgram(s) Inhaler 2 Puff(s) Inhalation two times a day  docusate sodium 100 milliGRAM(s) Oral three times a day  ferrous    sulfate 325 milliGRAM(s) Oral daily  metoprolol tartrate 12.5 milliGRAM(s) Oral two times a day  nystatin Cream 1 Application(s) Topical two times a day    PRN Inpatient Medications  ALBUTerol/ipratropium for Nebulization 3 milliLiter(s) Nebulizer every 4 hours PRN      REVIEW OF SYSTEMS  --------------------------------------------------------------------------------  Gen: No fevers/chills  Skin: No rashes  Head/Eyes/Ears/Mouth: No headache  Respiratory: No dyspnea  CV: No chest pain  GI: No abdominal pain  : No dysuria  MSK: No edema  Neuro: No dizziness/lightheadedness    VITALS/PHYSICAL EXAM  --------------------------------------------------------------------------------  T(C): 36.3 (05-10-18 @ 05:40), Max: 36.4 (05-09-18 @ 20:00)  HR: 60 (05-10-18 @ 09:45) (59 - 60)  BP: 115/59 (05-10-18 @ 09:45) (105/48 - 117/58)  RR: 18 (05-10-18 @ 09:45) (16 - 18)  SpO2: 99% (05-10-18 @ 09:45) (96% - 99%)  Wt(kg): --        05-09-18 @ 07:01  -  05-10-18 @ 07:00  --------------------------------------------------------  IN: 436 mL / OUT: 0 mL / NET: 436 mL      Physical Exam:  	Gen: NAD, well-appearing  	HEENT: MMM  	Pulm: No rales audible on exam  	CV: RRR, S1S2; +JVD, lying down at 30 degrees  	Abd: +BS, soft, nontender/nondistended  	: No suprapubic tenderness  	UE:  no edema  	LE:  1+ pitting edema  	Neuro: No focal deficits  	Psych: Normal affect and mood  	Skin: Warm    LABS/STUDIES  --------------------------------------------------------------------------------              10.9   12.02 >-----------<  81       [05-10-18 @ 06:40]              32.9     132  |  89  |  159  ----------------------------<  105      [05-10-18 @ 06:40]  4.9   |  17  |  4.52        Ca     8.6     [05-10-18 @ 06:40]      Mg     3.4     [05-10-18 @ 06:40]    TPro  6.1  /  Alb  3.4  /  TBili  3.0  /  DBili  2.1  /  AST  91  /  ALT  62  /  AlkPhos  151  [05-10-18 @ 06:40]    PT/INR: PT 45.6 , INR 3.99       [05-10-18 @ 06:40]          [05-10-18 @ 06:40]    Creatinine Trend:  SCr 4.52 [05-10 @ 06:40]  SCr 4.64 [05-09 @ 06:04]  SCr 4.72 [05-08 @ 06:40]  SCr 4.55 [05-07 @ 06:35]  SCr 4.58 [05-06 @ 08:27]

## 2018-05-10 NOTE — PROGRESS NOTE ADULT - SUBJECTIVE AND OBJECTIVE BOX
INTERVAL HPI/OVERNIGHT EVENTS:  Patient S&E at bedside. No o/n events, patient resting comfortably. No complaints at this time. Poor appetite per daughter.     VITAL SIGNS:  T(F): 97.3 (05-10-18 @ 05:40)  HR: 60 (05-10-18 @ 09:45)  BP: 115/59 (05-10-18 @ 09:45)  RR: 18 (05-10-18 @ 09:45)  SpO2: 99% (05-10-18 @ 09:45)  Wt(kg): --    PHYSICAL EXAM:  Constitutional: NAD  Eyes: EOMI, sclera non-icteric  Neck: supple, no masses, no JVD  Respiratory: CTA b/l anteriorly  Cardiovascular: RRR, no M/R/G; PPM in place  Gastrointestinal: soft, NTND, no masses palpable, + BS  Extremities: no c/c/e  Neurological: AAOx2    MEDICATIONS  (STANDING):  aspirin  chewable 81 milliGRAM(s) Oral daily  buDESOnide 160 MICROgram(s)/formoterol 4.5 MICROgram(s) Inhaler 2 Puff(s) Inhalation two times a day  docusate sodium 100 milliGRAM(s) Oral three times a day  ferrous    sulfate 325 milliGRAM(s) Oral daily  metoprolol tartrate 12.5 milliGRAM(s) Oral two times a day  nystatin Cream 1 Application(s) Topical two times a day    MEDICATIONS  (PRN):  ALBUTerol/ipratropium for Nebulization 3 milliLiter(s) Nebulizer every 4 hours PRN Shortness of Breath and/or Wheezing    Allergies  No Known Allergies    Intolerances  lisinopril (Other)      LABS:                        10.9   12.02 )-----------( 81       ( 10 May 2018 06:40 )             32.9     05-10    132<L>  |  89<L>  |  159<H>  ----------------------------<  105<H>  4.9   |  17<L>  |  4.52<H>    Ca    8.6      10 May 2018 06:40  Mg     3.4     05-10    TPro  6.1  /  Alb  3.4  /  TBili  3.0<H>  /  DBili  2.1<H>  /  AST  91<H>  /  ALT  62<H>  /  AlkPhos  151<H>  05-10    PT/INR - ( 10 May 2018 06:40 )   PT: 45.6 SEC;   INR: 3.99                RADIOLOGY & ADDITIONAL TESTS:  Studies reviewed.    ASSESSMENT & PLAN:

## 2018-05-10 NOTE — PROGRESS NOTE ADULT - ATTENDING COMMENTS
Patient seen and examined earlier today with the renal team. TTE and guaiac would be helpful to uncover occult bleed or a [id number] leak

## 2018-05-11 DIAGNOSIS — Z71.89 OTHER SPECIFIED COUNSELING: ICD-10-CM

## 2018-05-11 DIAGNOSIS — N19 UNSPECIFIED KIDNEY FAILURE: ICD-10-CM

## 2018-05-11 LAB
ADAMTS 13 ACTIVITY REFLEX: SIGNIFICANT CHANGE UP
ALBUMIN SERPL ELPH-MCNC: 3.4 G/DL — SIGNIFICANT CHANGE UP (ref 3.3–5)
ALP SERPL-CCNC: 146 U/L — HIGH (ref 40–120)
ALT FLD-CCNC: 63 U/L — HIGH (ref 4–33)
AST SERPL-CCNC: 87 U/L — HIGH (ref 4–32)
BASOPHILS # BLD AUTO: 0.01 K/UL — SIGNIFICANT CHANGE UP (ref 0–0.2)
BASOPHILS NFR BLD AUTO: 0.1 % — SIGNIFICANT CHANGE UP (ref 0–2)
BILIRUB DIRECT SERPL-MCNC: 2.3 MG/DL — HIGH (ref 0.1–0.2)
BILIRUB SERPL-MCNC: 3.3 MG/DL — HIGH (ref 0.2–1.2)
BUN SERPL-MCNC: 169 MG/DL — HIGH (ref 7–23)
CALCIUM SERPL-MCNC: 8.6 MG/DL — SIGNIFICANT CHANGE UP (ref 8.4–10.5)
CHLORIDE SERPL-SCNC: 90 MMOL/L — LOW (ref 98–107)
CO2 SERPL-SCNC: 18 MMOL/L — LOW (ref 22–31)
CREAT SERPL-MCNC: 4.49 MG/DL — HIGH (ref 0.5–1.3)
EOSINOPHIL # BLD AUTO: 0.06 K/UL — SIGNIFICANT CHANGE UP (ref 0–0.5)
EOSINOPHIL NFR BLD AUTO: 0.6 % — SIGNIFICANT CHANGE UP (ref 0–6)
GLUCOSE SERPL-MCNC: 103 MG/DL — HIGH (ref 70–99)
HAPTOGLOB SERPL-MCNC: < 20 MG/DL — LOW (ref 34–200)
HCT VFR BLD CALC: 33.3 % — LOW (ref 34.5–45)
HGB BLD-MCNC: 10.9 G/DL — LOW (ref 11.5–15.5)
IMM GRANULOCYTES # BLD AUTO: 0.05 # — SIGNIFICANT CHANGE UP
IMM GRANULOCYTES NFR BLD AUTO: 0.5 % — SIGNIFICANT CHANGE UP (ref 0–1.5)
INR BLD: 3.39 — HIGH (ref 0.88–1.17)
LDH SERPL L TO P-CCNC: 895 U/L — HIGH (ref 135–225)
LYMPHOCYTES # BLD AUTO: 0.57 K/UL — LOW (ref 1–3.3)
LYMPHOCYTES # BLD AUTO: 5.6 % — LOW (ref 13–44)
MAGNESIUM SERPL-MCNC: 3.6 MG/DL — HIGH (ref 1.6–2.6)
MCHC RBC-ENTMCNC: 32.2 PG — SIGNIFICANT CHANGE UP (ref 27–34)
MCHC RBC-ENTMCNC: 32.7 % — SIGNIFICANT CHANGE UP (ref 32–36)
MCV RBC AUTO: 98.5 FL — SIGNIFICANT CHANGE UP (ref 80–100)
MONOCYTES # BLD AUTO: 0.89 K/UL — SIGNIFICANT CHANGE UP (ref 0–0.9)
MONOCYTES NFR BLD AUTO: 8.7 % — SIGNIFICANT CHANGE UP (ref 2–14)
NEUTROPHILS # BLD AUTO: 8.64 K/UL — HIGH (ref 1.8–7.4)
NEUTROPHILS NFR BLD AUTO: 84.5 % — HIGH (ref 43–77)
NRBC # FLD: 0.42 — SIGNIFICANT CHANGE UP
NRBC FLD-RTO: 4.1 — SIGNIFICANT CHANGE UP
PLATELET # BLD AUTO: 81 K/UL — LOW (ref 150–400)
PMV BLD: SIGNIFICANT CHANGE UP FL (ref 7–13)
POTASSIUM SERPL-MCNC: 4.8 MMOL/L — SIGNIFICANT CHANGE UP (ref 3.5–5.3)
POTASSIUM SERPL-SCNC: 4.8 MMOL/L — SIGNIFICANT CHANGE UP (ref 3.5–5.3)
PROT SERPL-MCNC: 6.3 G/DL — SIGNIFICANT CHANGE UP (ref 6–8.3)
PROTHROM AB SERPL-ACNC: 40 SEC — HIGH (ref 9.8–13.1)
RBC # BLD: 3.38 M/UL — LOW (ref 3.8–5.2)
RBC # FLD: 25.5 % — HIGH (ref 10.3–14.5)
SODIUM SERPL-SCNC: 134 MMOL/L — LOW (ref 135–145)
WBC # BLD: 10.22 K/UL — SIGNIFICANT CHANGE UP (ref 3.8–10.5)
WBC # FLD AUTO: 10.22 K/UL — SIGNIFICANT CHANGE UP (ref 3.8–10.5)

## 2018-05-11 PROCEDURE — 99232 SBSQ HOSP IP/OBS MODERATE 35: CPT | Mod: GC

## 2018-05-11 PROCEDURE — 99233 SBSQ HOSP IP/OBS HIGH 50: CPT

## 2018-05-11 RX ORDER — MORPHINE SULFATE 50 MG/1
2 CAPSULE, EXTENDED RELEASE ORAL EVERY 4 HOURS
Qty: 0 | Refills: 0 | Status: DISCONTINUED | OUTPATIENT
Start: 2018-05-11 | End: 2018-05-13

## 2018-05-11 RX ORDER — PANTOPRAZOLE SODIUM 20 MG/1
40 TABLET, DELAYED RELEASE ORAL
Qty: 0 | Refills: 0 | Status: DISCONTINUED | OUTPATIENT
Start: 2018-05-11 | End: 2018-05-13

## 2018-05-11 RX ADMIN — NYSTATIN CREAM 1 APPLICATION(S): 100000 CREAM TOPICAL at 18:15

## 2018-05-11 RX ADMIN — NYSTATIN CREAM 1 APPLICATION(S): 100000 CREAM TOPICAL at 06:00

## 2018-05-11 RX ADMIN — Medication 100 MILLIGRAM(S): at 06:00

## 2018-05-11 RX ADMIN — Medication 100 MILLIGRAM(S): at 13:26

## 2018-05-11 RX ADMIN — Medication 100 MILLIGRAM(S): at 21:10

## 2018-05-11 RX ADMIN — BUDESONIDE AND FORMOTEROL FUMARATE DIHYDRATE 2 PUFF(S): 160; 4.5 AEROSOL RESPIRATORY (INHALATION) at 21:11

## 2018-05-11 RX ADMIN — Medication 12.5 MILLIGRAM(S): at 04:33

## 2018-05-11 RX ADMIN — BUDESONIDE AND FORMOTEROL FUMARATE DIHYDRATE 2 PUFF(S): 160; 4.5 AEROSOL RESPIRATORY (INHALATION) at 09:46

## 2018-05-11 NOTE — PROGRESS NOTE ADULT - PROBLEM SELECTOR PLAN 2
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 TMA 2/2 underlying autoimmune dx given acute renal failure.  pt also with macroytosis and elevated retic count  Coomb negative,B12 WNL , Iron studies cw AOCD  CT of C/A/P - no obvious source of malignancy or infection noted.

## 2018-05-11 NOTE — PROGRESS NOTE ADULT - PROBLEM SELECTOR PLAN 1
Concerns about worsened mental status related to renal failure.  Discussed with daughter/family on the phone - HD is not consistent with patient's wishes.  Aware of probable fatal outcome without HD - however is not agreeable for it.

## 2018-05-11 NOTE — PROGRESS NOTE ADULT - PROBLEM SELECTOR PLAN 10
Discussed the case with daughter over the phone for 30 minutes.  DNR/DNI, no feeding tube, no HD, treatment with primary goal of comfort.  Will contact hospice for disposition plan.  Will review med list and discontinue all non-essential medications and regiments.

## 2018-05-11 NOTE — PROGRESS NOTE ADULT - PROBLEM SELECTOR PLAN 1
Pt. with JAKUB on CKD. CKD likely in the setting of DM, HTN, CHF. JAKUB likely hemodynamically mediated in the setting of decreased PO intake, N/V and low BP readings. As per review on labs from [facility]/[place], pt. Scr ranges from 1.5-2 since 8/2017. Scr on admission was elevated at 4.3.      Patient with asterixis on exam this morning, reports that nausea is worsening.  Will plan to start HD with non-tunnelled dialysis catheter today for uremic syndrome.  Consulted IR for temp HD cath.  Daily monitoring for HD needs.  Patient is amenable to long term dialysis.  Patient reports her sister was on HD and she is aware of the implications on her quality of life. Pt. with JAKUB on CKD. CKD likely in the setting of DM, HTN, CHF. JAKUB likely hemodynamically mediated in the setting of decreased PO intake, N/V and low BP readings. [facility] [place] [facility], pt. Scr ranges from 1.5-2 since 8/2017. Scr on admission was elevated at 4.3.

## 2018-05-11 NOTE — PROGRESS NOTE ADULT - ATTENDING COMMENTS
worsening uremia, hemolysis..... pt/family do not want aggrieve measures...D/w [place] and comfort care is being sought

## 2018-05-11 NOTE — PROGRESS NOTE ADULT - SUBJECTIVE AND OBJECTIVE BOX
[facility] DIVISION OF KIDNEY DISEASES AND HYPERTENSION -- FOLLOW UP NOTE  --------------------------------------------------------------------------------  Chief Complaint:  JAKUB on CKD    24 hour events/subjective:  Patient reports no appetite, did not eat any of her breakfast.  Reports sleeping well. +asterixis on exam today.        PAST HISTORY  --------------------------------------------------------------------------------  No significant changes to PMH, PSH, FHx, SHx, unless otherwise noted    ALLERGIES & MEDICATIONS  --------------------------------------------------------------------------------  Allergies    No Known Allergies    Intolerances    lisinopril (Other)    Standing Inpatient Medications  aspirin  chewable 81 milliGRAM(s) Oral daily  buDESOnide 160 MICROgram(s)/formoterol 4.5 MICROgram(s) Inhaler 2 Puff(s) Inhalation two times a day  docusate sodium 100 milliGRAM(s) Oral three times a day  ferrous    sulfate 325 milliGRAM(s) Oral daily  metoprolol tartrate 12.5 milliGRAM(s) Oral two times a day  nystatin Cream 1 Application(s) Topical two times a day    PRN Inpatient Medications  ALBUTerol/ipratropium for Nebulization 3 milliLiter(s) Nebulizer every 4 hours PRN      REVIEW OF SYSTEMS  --------------------------------------------------------------------------------  Gen: No fevers/chills  Skin: No rashes  Head/Eyes/Ears/Mouth: No headache  Respiratory: No dyspnea  CV: No chest pain  GI: No abdominal pain, +poor appetite, minimal PO intake  : No dysuria  MSK: No edema  Neuro: No dizziness/lightheadedness    VITALS/PHYSICAL EXAM  --------------------------------------------------------------------------------  T(C): 36.4 (05-11-18 @ 04:17), Max: 36.4 (05-11-18 @ 04:17)  HR: 60 (05-11-18 @ 04:17) (60 - 60)  BP: 105/55 (05-11-18 @ 04:17) (104/59 - 111/55)  RR: 18 (05-11-18 @ 04:17) (18 - 18)  SpO2: 97% (05-11-18 @ 04:17) (97% - 98%)  Wt(kg): --        05-10-18 @ 07:01  -  05-11-18 @ 07:00  --------------------------------------------------------  IN: 90 mL / OUT: 0 mL / NET: 90 mL      Physical Exam:  	Gen: NAD  	HEENT: MMM  	Pulm: intermittent rales audible in lower lung fields  	CV: RRR, S1S2; no rub  	Abd: +BS, soft, nontender/nondistended  	: No suprapubic tenderness  	UE:  no edema  	LE:  1+ pitting edema to thighs  	Neuro: No focal deficits  	Psych: Normal affect and mood  	Skin: Warm  	Vascular access: none    LABS/STUDIES  --------------------------------------------------------------------------------              10.9   10.22 >-----------<  81       [05-11-18 @ 04:30]              33.3     134  |  90  |  169  ----------------------------<  103      [05-11-18 @ 04:30]  4.8   |  18  |  4.49        Ca     8.6     [05-11-18 @ 04:30]      Mg     3.6     [05-11-18 @ 04:30]    TPro  6.3  /  Alb  3.4  /  TBili  3.3  /  DBili  2.3  /  AST  87  /  ALT  63  /  AlkPhos  146  [05-11-18 @ 04:30]    PT/INR: PT 40.0 , INR 3.39       [05-11-18 @ 04:30]          [05-11-18 @ 04:30]    Creatinine Trend:  SCr 4.49 [05-11 @ 04:30]  SCr 4.52 [05-10 @ 06:40]  SCr 4.64 [05-09 @ 06:04]  SCr 4.72 [05-08 @ 06:40]  SCr 4.55 [05-07 @ 06:35]

## 2018-05-11 NOTE — PROGRESS NOTE ADULT - SUBJECTIVE AND OBJECTIVE BOX
CC: F/U for worsening mental status    SUBJECTIVE / OVERNIGHT EVENTS:  Appear more weaker than yesterday.  Denies any discomfort.  No F/C, N/V, CP, SOB, Cough, lightheadedness, dizziness, abdominal pain, diarrhea, dysuria.    MEDICATIONS  (STANDING):  aspirin  chewable 81 milliGRAM(s) Oral daily  buDESOnide 160 MICROgram(s)/formoterol 4.5 MICROgram(s) Inhaler 2 Puff(s) Inhalation two times a day  docusate sodium 100 milliGRAM(s) Oral three times a day  ferrous    sulfate 325 milliGRAM(s) Oral daily  metoprolol tartrate 12.5 milliGRAM(s) Oral two times a day  nystatin Cream 1 Application(s) Topical two times a day  pantoprazole    Tablet 40 milliGRAM(s) Oral before breakfast    MEDICATIONS  (PRN):  ALBUTerol/ipratropium for Nebulization 3 milliLiter(s) Nebulizer every 4 hours PRN Shortness of Breath and/or Wheezing      Vital Signs Last 24 Hrs  T(C): 36.4 (11 May 2018 04:17), Max: 36.4 (11 May 2018 04:17)  T(F): 97.6 (11 May 2018 04:17), Max: 97.6 (11 May 2018 04:17)  HR: 60 (11 May 2018 04:17) (60 - 60)  BP: 105/55 (11 May 2018 04:17) (104/59 - 111/55)  BP(mean): --  RR: 18 (11 May 2018 04:17) (18 - 18)  SpO2: 97% (11 May 2018 04:17) (97% - 98%)  CAPILLARY BLOOD GLUCOSE        I&O's Summary    10 May 2018 07:01  -  11 May 2018 07:00  --------------------------------------------------------  IN: 90 mL / OUT: 0 mL / NET: 90 mL        PHYSICAL EXAM:  GENERAL: NAD, well-developed  HEAD:  Atraumatic, Normocephalic  EYES: EOMI, PERRLA, conjunctiva and sclera clear  NECK: Supple, No JVD  CHEST/LUNG: Clear to auscultation bilaterally; No wheeze  HEART: Regular rate and rhythm; Loud S1, presence of holosytolic murmur at apex   ABDOMEN: Soft, Nontender, Nondistended; Bowel sounds present  EXTREMITIES:  2+ Peripheral Pulses, No clubbing, cyanosis, grade 2-3 edema   PSYCH: AAOx2  NEUROLOGY: mild asterixis  SKIN: No rashes or lesions    LABS:                        10.9   10.22 )-----------( 81       ( 11 May 2018 04:30 )             33.3     05-11    134<L>  |  90<L>  |  169<H>  ----------------------------<  103<H>  4.8   |  18<L>  |  4.49<H>    Ca    8.6      11 May 2018 04:30  Mg     3.6     05-11    TPro  6.3  /  Alb  3.4  /  TBili  3.3<H>  /  DBili  2.3<H>  /  AST  87<H>  /  ALT  63<H>  /  AlkPhos  146<H>  05-11    PT/INR - ( 11 May 2018 04:30 )   PT: 40.0 SEC;   INR: 3.39                    RADIOLOGY & ADDITIONAL TESTS:    Imaging Personally Reviewed:    Care Discussed with Consultants/Other Providers: Dr. Lauren - Heme/Onc - anemia/thrombocytopenia, Dr. Olsen - Renal - JAKUB with uremia

## 2018-05-11 NOTE — GOALS OF CARE CONVERSATION - PERSONAL ADVANCE DIRECTIVE - CONVERSATION DETAILS
[facility]    Meeting held with pt's daughter Alice Grove ([phone]). Hospice Care, services, eligibility and consents explained. Approval for admission to home hospice care was received from [facility].   Alice wishes to discuss a possible hospice plan of care with pt  - before the consents are signed. There is no consent for hospice as of yet. [facility] liaisons will revisit w/ pt and daughter on Monday 5/14/18.

## 2018-05-12 LAB
ALBUMIN SERPL ELPH-MCNC: 3.4 G/DL — SIGNIFICANT CHANGE UP (ref 3.3–5)
ALP SERPL-CCNC: 132 U/L — HIGH (ref 40–120)
ALT FLD-CCNC: 62 U/L — HIGH (ref 4–33)
APTT BLD: 35.7 SEC — SIGNIFICANT CHANGE UP (ref 27.5–37.4)
AST SERPL-CCNC: 94 U/L — HIGH (ref 4–32)
BACTERIA BLD CULT: SIGNIFICANT CHANGE UP
BASOPHILS # BLD AUTO: 0.01 K/UL — SIGNIFICANT CHANGE UP (ref 0–0.2)
BASOPHILS NFR BLD AUTO: 0.1 % — SIGNIFICANT CHANGE UP (ref 0–2)
BILIRUB SERPL-MCNC: 4 MG/DL — HIGH (ref 0.2–1.2)
BUN SERPL-MCNC: 182 MG/DL — HIGH (ref 7–23)
CALCIUM SERPL-MCNC: 8.6 MG/DL — SIGNIFICANT CHANGE UP (ref 8.4–10.5)
CHLORIDE SERPL-SCNC: 89 MMOL/L — LOW (ref 98–107)
CO2 SERPL-SCNC: 16 MMOL/L — LOW (ref 22–31)
CREAT SERPL-MCNC: 4.37 MG/DL — HIGH (ref 0.5–1.3)
EOSINOPHIL # BLD AUTO: 0.04 K/UL — SIGNIFICANT CHANGE UP (ref 0–0.5)
EOSINOPHIL NFR BLD AUTO: 0.3 % — SIGNIFICANT CHANGE UP (ref 0–6)
G6PD RBC-CCNC: 16.4 — SIGNIFICANT CHANGE UP (ref 7–20.5)
GLUCOSE SERPL-MCNC: 87 MG/DL — SIGNIFICANT CHANGE UP (ref 70–99)
HCT VFR BLD CALC: 32.4 % — LOW (ref 34.5–45)
HGB BLD-MCNC: 10.7 G/DL — LOW (ref 11.5–15.5)
IMM GRANULOCYTES # BLD AUTO: 0.07 # — SIGNIFICANT CHANGE UP
IMM GRANULOCYTES NFR BLD AUTO: 0.6 % — SIGNIFICANT CHANGE UP (ref 0–1.5)
INR BLD: 2.75 — HIGH (ref 0.88–1.17)
LYMPHOCYTES # BLD AUTO: 0.41 K/UL — LOW (ref 1–3.3)
LYMPHOCYTES # BLD AUTO: 3.2 % — LOW (ref 13–44)
MAGNESIUM SERPL-MCNC: 3.5 MG/DL — HIGH (ref 1.6–2.6)
MCHC RBC-ENTMCNC: 33 % — SIGNIFICANT CHANGE UP (ref 32–36)
MCHC RBC-ENTMCNC: 33.2 PG — SIGNIFICANT CHANGE UP (ref 27–34)
MCV RBC AUTO: 100.6 FL — HIGH (ref 80–100)
MONOCYTES # BLD AUTO: 1.04 K/UL — HIGH (ref 0–0.9)
MONOCYTES NFR BLD AUTO: 8.2 % — SIGNIFICANT CHANGE UP (ref 2–14)
NEUTROPHILS # BLD AUTO: 11.05 K/UL — HIGH (ref 1.8–7.4)
NEUTROPHILS NFR BLD AUTO: 87.6 % — HIGH (ref 43–77)
NRBC # FLD: 0.4 — SIGNIFICANT CHANGE UP
NRBC FLD-RTO: 3.2 — SIGNIFICANT CHANGE UP
PHOSPHATE SERPL-MCNC: 9.9 MG/DL — HIGH (ref 2.5–4.5)
PLATELET # BLD AUTO: 62 K/UL — LOW (ref 150–400)
PMV BLD: SIGNIFICANT CHANGE UP FL (ref 7–13)
POTASSIUM SERPL-MCNC: 4.9 MMOL/L — SIGNIFICANT CHANGE UP (ref 3.5–5.3)
POTASSIUM SERPL-SCNC: 4.9 MMOL/L — SIGNIFICANT CHANGE UP (ref 3.5–5.3)
PROT SERPL-MCNC: 6.3 G/DL — SIGNIFICANT CHANGE UP (ref 6–8.3)
PROTHROM AB SERPL-ACNC: 31.2 SEC — HIGH (ref 9.8–13.1)
RBC # BLD: 3.22 M/UL — LOW (ref 3.8–5.2)
RBC # FLD: 26 % — HIGH (ref 10.3–14.5)
SODIUM SERPL-SCNC: 134 MMOL/L — LOW (ref 135–145)
WBC # BLD: 12.62 K/UL — HIGH (ref 3.8–10.5)
WBC # FLD AUTO: 12.62 K/UL — HIGH (ref 3.8–10.5)

## 2018-05-12 PROCEDURE — 99233 SBSQ HOSP IP/OBS HIGH 50: CPT

## 2018-05-12 RX ADMIN — Medication 100 MILLIGRAM(S): at 17:08

## 2018-05-12 RX ADMIN — NYSTATIN CREAM 1 APPLICATION(S): 100000 CREAM TOPICAL at 05:34

## 2018-05-12 RX ADMIN — Medication 100 MILLIGRAM(S): at 05:34

## 2018-05-12 RX ADMIN — BUDESONIDE AND FORMOTEROL FUMARATE DIHYDRATE 2 PUFF(S): 160; 4.5 AEROSOL RESPIRATORY (INHALATION) at 08:58

## 2018-05-12 RX ADMIN — PANTOPRAZOLE SODIUM 40 MILLIGRAM(S): 20 TABLET, DELAYED RELEASE ORAL at 11:58

## 2018-05-12 RX ADMIN — BUDESONIDE AND FORMOTEROL FUMARATE DIHYDRATE 2 PUFF(S): 160; 4.5 AEROSOL RESPIRATORY (INHALATION) at 21:10

## 2018-05-12 RX ADMIN — NYSTATIN CREAM 1 APPLICATION(S): 100000 CREAM TOPICAL at 17:08

## 2018-05-12 RX ADMIN — Medication 100 MILLIGRAM(S): at 21:10

## 2018-05-12 NOTE — CHART NOTE - NSCHARTNOTEFT_GEN_A_CORE
[id number] activity % reviewed, patient does not have TTP as the activity is >10%. Agree with best supportive/hospice care after conversation with patient's decisionmaker/daughter earlier this week.     Hematology is signing off, please call fellow if any further questions or concerns.     James Suazo  PGY-5, Hematology-Oncology Fellow  [phone] ([place]) [phone] ([facility])

## 2018-05-12 NOTE — PROGRESS NOTE ADULT - SUBJECTIVE AND OBJECTIVE BOX
CHIEF COMPLAINT: Patient is a 89y old  female who presents with a chief complaint of Hyperkalemia (05 May 2018 04:33)      SUBJECTIVE / OVERNIGHT EVENTS:  pt feels ok, denies chest pain/sob    MEDICATIONS  (STANDING):  buDESOnide 160 MICROgram(s)/formoterol 4.5 MICROgram(s) Inhaler 2 Puff(s) Inhalation two times a day  docusate sodium 100 milliGRAM(s) Oral three times a day  nystatin Cream 1 Application(s) Topical two times a day  pantoprazole    Tablet 40 milliGRAM(s) Oral before breakfast    MEDICATIONS  (PRN):  ALBUTerol/ipratropium for Nebulization 3 milliLiter(s) Nebulizer every 4 hours PRN Shortness of Breath and/or Wheezing  morphine  - Injectable 2 milliGRAM(s) IV Push every 4 hours PRN discomfort or dyspnea      VITALS:  T(F): 97.7 (05-12-18 @ 05:31), Max: 97.7 (05-12-18 @ 05:31)  HR: 65 (05-12-18 @ 09:00) (59 - 65)  BP: 112/80 (05-12-18 @ 09:00) (101/50 - 113/49)  RR: 18 (05-12-18 @ 05:31) (18 - 19)  SpO2: 100% (05-12-18 @ 05:31)      CAPILLARY BLOOD GLUCOSE    Output     I&O's Summary  T(F): 97.7 (05-12-18 @ 05:31), Max: 97.7 (05-12-18 @ 05:31)  HR: 65 (05-12-18 @ 09:00) (59 - 65)  BP: 112/80 (05-12-18 @ 09:00) (101/50 - 113/49)  RR: 18 (05-12-18 @ 05:31) (18 - 19)  SpO2: 100% (05-12-18 @ 05:31)    PHYSICAL EXAM:  GENERAL: NAD, well-developed  HEAD:  Atraumatic, Normocephalic  EYES: EOMI,  conjunctiva and sclera clear  NECK: Supple, No JVD  CHEST/LUNG: Clear to auscultation bilaterally; No wheeze  HEART: Regular rate and rhythm; Loud S1, 3/6 LALA  ABDOMEN: Soft, Nontender, Nondistended; Bowel sounds present  EXTREMITIES:  2+ Peripheral Pulses, No clubbing, cyanosis, grade 2-3 edema   PSYCH: AAOx2  NEUROLOGY: mild asterixis< from: CT Chest w/ Oral Cont (05.09.18 @ 17:22) >  No acute pathology in the chest, abdomen or pelvis..    < end of copied text >    SKIN: No rashes or lesions    LABS:              10.7                 134  | 16   | 182          12.62 >-----------< 62      ------------------------< 87                    32.4                 4.9  | 89   | 4.37                                         Ca 8.6   Mg 3.5   Ph 9.9         TPro  6.3  /  Alb  3.4      TBili  4.0  /  DBili  x         AST  94  /  ALT  62            AlkPhos  132      INR: 2.75<H>;    PT: 31.2 SEC<H>;    PTT: 35.7 SEC      MICROBIOLOGY:    RADIOLOGY & ADDITIONAL TESTS:    Imaging Personally reviewed  < from: CT Chest w/ Oral Cont (05.09.18 @ 17:22) >  IMPRESSION:     No acute pathology in the chest, abdomen or pelvis..    [ ] Consultant(s) Notes Reviewed:  [ ] Care Discussed with Consultants/Other Providers:

## 2018-05-12 NOTE — PROGRESS NOTE ADULT - PROBLEM SELECTOR PLAN 10
Discussed the case with daughter over the phone for 30 minutes.  DNR/DNI, no feeding tube, no HD, treatment with primary goal of comfort.  Will contact hospice for disposition plan.  Will review med list and discontinue all non-essential medications and regiments. Per Dr Bridges's conversation with pt's daughter.  Pt is DNR/DNI, no feeding tube, no HD, treatment with primary goal of comfort.  Will contact hospice for disposition plan.  Will review med list and discontinue all non-essential medications and regiments.

## 2018-05-12 NOTE — PROGRESS NOTE ADULT - PROBLEM SELECTOR PLAN 2
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  Coomb negative,B12 WNL , Iron studies cw AOCD  CT of C/A/P -unremarkable, no obvious source of malignancy or infection noted.

## 2018-05-13 LAB
ALBUMIN SERPL ELPH-MCNC: 3.4 G/DL — SIGNIFICANT CHANGE UP (ref 3.3–5)
ALP SERPL-CCNC: 130 U/L — HIGH (ref 40–120)
ALT FLD-CCNC: 60 U/L — HIGH (ref 4–33)
APTT BLD: 35.5 SEC — SIGNIFICANT CHANGE UP (ref 27.5–37.4)
AST SERPL-CCNC: 94 U/L — HIGH (ref 4–32)
BILIRUB DIRECT SERPL-MCNC: 3.2 MG/DL — HIGH (ref 0.1–0.2)
BILIRUB SERPL-MCNC: 4.5 MG/DL — HIGH (ref 0.2–1.2)
BUN SERPL-MCNC: 201 MG/DL — HIGH (ref 7–23)
CALCIUM SERPL-MCNC: 8.5 MG/DL — SIGNIFICANT CHANGE UP (ref 8.4–10.5)
CHLORIDE SERPL-SCNC: 88 MMOL/L — LOW (ref 98–107)
CO2 SERPL-SCNC: 14 MMOL/L — LOW (ref 22–31)
CREAT SERPL-MCNC: 4.68 MG/DL — HIGH (ref 0.5–1.3)
GLUCOSE SERPL-MCNC: 105 MG/DL — HIGH (ref 70–99)
HAPTOGLOB SERPL-MCNC: < 20 MG/DL — LOW (ref 34–200)
HCT VFR BLD CALC: 32.8 % — LOW (ref 34.5–45)
HGB BLD-MCNC: 11.1 G/DL — LOW (ref 11.5–15.5)
INR BLD: 2.72 — HIGH (ref 0.88–1.17)
LDH SERPL L TO P-CCNC: 961 U/L — HIGH (ref 135–225)
MAGNESIUM SERPL-MCNC: 3.7 MG/DL — HIGH (ref 1.6–2.6)
MCHC RBC-ENTMCNC: 33.8 % — SIGNIFICANT CHANGE UP (ref 32–36)
MCHC RBC-ENTMCNC: 34.2 PG — HIGH (ref 27–34)
MCV RBC AUTO: 100.9 FL — HIGH (ref 80–100)
NRBC # FLD: 0.53 — SIGNIFICANT CHANGE UP
NRBC FLD-RTO: 4.7 — SIGNIFICANT CHANGE UP
PLATELET # BLD AUTO: 67 K/UL — LOW (ref 150–400)
PMV BLD: SIGNIFICANT CHANGE UP FL (ref 7–13)
POTASSIUM SERPL-MCNC: 5.2 MMOL/L — SIGNIFICANT CHANGE UP (ref 3.5–5.3)
POTASSIUM SERPL-SCNC: 5.2 MMOL/L — SIGNIFICANT CHANGE UP (ref 3.5–5.3)
PROT SERPL-MCNC: 6.4 G/DL — SIGNIFICANT CHANGE UP (ref 6–8.3)
PROTHROM AB SERPL-ACNC: 31.9 SEC — HIGH (ref 9.8–13.1)
RBC # BLD: 3.25 M/UL — LOW (ref 3.8–5.2)
RBC # FLD: 26.6 % — HIGH (ref 10.3–14.5)
SODIUM SERPL-SCNC: 133 MMOL/L — LOW (ref 135–145)
WBC # BLD: 11.21 K/UL — HIGH (ref 3.8–10.5)
WBC # FLD AUTO: 11.21 K/UL — HIGH (ref 3.8–10.5)

## 2018-05-13 PROCEDURE — 99233 SBSQ HOSP IP/OBS HIGH 50: CPT

## 2018-05-13 RX ORDER — MORPHINE SULFATE 50 MG/1
2 CAPSULE, EXTENDED RELEASE ORAL EVERY 4 HOURS
Qty: 0 | Refills: 0 | Status: DISCONTINUED | OUTPATIENT
Start: 2018-05-13 | End: 2018-05-13

## 2018-05-13 RX ORDER — SODIUM BICARBONATE 1 MEQ/ML
650 SYRINGE (ML) INTRAVENOUS THREE TIMES A DAY
Qty: 0 | Refills: 0 | Status: DISCONTINUED | OUTPATIENT
Start: 2018-05-13 | End: 2018-05-13

## 2018-05-13 RX ORDER — SODIUM BICARBONATE 1 MEQ/ML
0.06 SYRINGE (ML) INTRAVENOUS
Qty: 75 | Refills: 0 | Status: DISCONTINUED | OUTPATIENT
Start: 2018-05-13 | End: 2018-05-13

## 2018-05-13 RX ADMIN — NYSTATIN CREAM 1 APPLICATION(S): 100000 CREAM TOPICAL at 05:00

## 2018-05-13 RX ADMIN — NYSTATIN CREAM 1 APPLICATION(S): 100000 CREAM TOPICAL at 18:06

## 2018-05-13 RX ADMIN — Medication 100 MILLIGRAM(S): at 13:30

## 2018-05-13 RX ADMIN — Medication 650 MILLIGRAM(S): at 13:30

## 2018-05-13 RX ADMIN — BUDESONIDE AND FORMOTEROL FUMARATE DIHYDRATE 2 PUFF(S): 160; 4.5 AEROSOL RESPIRATORY (INHALATION) at 12:05

## 2018-05-13 RX ADMIN — MORPHINE SULFATE 2 MILLIGRAM(S): 50 CAPSULE, EXTENDED RELEASE ORAL at 19:27

## 2018-05-13 RX ADMIN — PANTOPRAZOLE SODIUM 40 MILLIGRAM(S): 20 TABLET, DELAYED RELEASE ORAL at 05:00

## 2018-05-13 RX ADMIN — Medication 100 MILLIGRAM(S): at 05:00

## 2018-05-13 NOTE — PROGRESS NOTE ADULT - PROBLEM SELECTOR PLAN 6
- Multifactorial causes for her elevated blood lactate (renal failure, decreased liver elimination, possibly medication use),  Repeat lactate decreasing  continue to trend
- On  Coumadin for Afib  INR supratherapeutic, no active bleeding  S/p vitamin K 2 mg yesterday   continue to monitor   - Currently rate controlled
- On  Coumadin for Afib  INR supratherapeutic, no active bleeding  will repeat INR this afternoon , if continues to trend up ,will administer low dose vitamin K  - Currently rate controlled
- On  Coumadin for Afib - held for hypertherapeutic INR  INR supratherapeutic, no active bleeding  continue to monitor   - Currently rate controlled
due to limited life expectancy, now off ASA and monitor off meds.
due to limited life expectancy, now off ASA and monitor off meds.
due to limited life expectancy, will discontinue ASA and monitor off meds.

## 2018-05-13 NOTE — PROGRESS NOTE ADULT - PROVIDER SPECIALTY LIST ADULT
Heme/Onc
Heme/Onc
Hospitalist
Nephrology
Hospitalist
Heme/Onc

## 2018-05-13 NOTE — PROGRESS NOTE ADULT - PROBLEM SELECTOR PROBLEM 4
Lactate blood increase
Thrombocytopenia
Thrombocytopenia
Transaminitis

## 2018-05-13 NOTE — PROGRESS NOTE ADULT - PROBLEM SELECTOR PROBLEM 8
COPD (Chronic Obstructive Pulmonary Disease)
COPD (Chronic Obstructive Pulmonary Disease)
DM (diabetes mellitus)
COPD (Chronic Obstructive Pulmonary Disease)

## 2018-05-13 NOTE — PROGRESS NOTE ADULT - PROBLEM SELECTOR PLAN 7
- Patient states she is on a baby aspirin, will c/w ASA 81 daily
Will discontinue FS checks because patient and family wishes comfort care.

## 2018-05-13 NOTE — CHART NOTE - NSCHARTNOTEFT_GEN_A_CORE
Called by RN to evaluate pt noted to be agonal breathing. On arrival, there is no longer visible chest rise, upon auscultation there is no audible heart or breath sounds, there is no palpable pulse, pupils unreactive to light. Time of death 21:12. Hospitalist and pt's daughter Alice Grove made aware. Pts daughter has chosen to defer an autopsy. Called by RN to evaluate pt noted to be agonal breathing. On arrival, there is no longer visible chest rise, upon auscultation there is no audible heart or breath sounds, there is no palpable pulse, pupils unreactive to light. Time of death 21:12. Dr. Joshi and pt's daughter Alice Grove made aware. Pts daughter has chosen to defer an autopsy.

## 2018-05-13 NOTE — PROGRESS NOTE ADULT - PROBLEM SELECTOR PROBLEM 7
Coronary artery disease involving native coronary artery of native heart without angina pectoris
DM (diabetes mellitus)

## 2018-05-13 NOTE — PROGRESS NOTE ADULT - SUBJECTIVE AND OBJECTIVE BOX
CHIEF COMPLAINT: Patient is a 89y old  female who presents with a chief complaint of Hyperkalemia (05 May 2018 04:33)      SUBJECTIVE / OVERNIGHT EVENTS:  pt feels ok, no specific complaints, poor appetite    MEDICATIONS  (STANDING):  buDESOnide 160 MICROgram(s)/formoterol 4.5 MICROgram(s) Inhaler 2 Puff(s) Inhalation two times a day  docusate sodium 100 milliGRAM(s) Oral three times a day  nystatin Cream 1 Application(s) Topical two times a day  pantoprazole    Tablet 40 milliGRAM(s) Oral before breakfast  sodium bicarbonate  Infusion 0.056 mEq/kG/Hr (50 mL/Hr) IV Continuous <Continuous>    MEDICATIONS  (PRN):  ALBUTerol/ipratropium for Nebulization 3 milliLiter(s) Nebulizer every 4 hours PRN Shortness of Breath and/or Wheezing  morphine  - Injectable 2 milliGRAM(s) IV Push every 4 hours PRN discomfort or dyspnea      VITALS:  T(F): 97.7 (05-13-18 @ 04:57), Max: 98.3 (05-12-18 @ 15:30)  HR: 59 (05-13-18 @ 04:57) (59 - 60)  BP: 116/49 (05-13-18 @ 04:57) (116/49 - 118/55)  RR: 16 (05-13-18 @ 04:57) (15 - 18)  SpO2: 98% (05-13-18 @ 04:57)      CAPILLARY BLOOD GLUCOSE    Output     I&O's Summary  T(F): 97.7 (05-13-18 @ 04:57), Max: 98.3 (05-12-18 @ 15:30)  HR: 59 (05-13-18 @ 04:57) (59 - 60)  BP: 116/49 (05-13-18 @ 04:57) (116/49 - 118/55)  RR: 16 (05-13-18 @ 04:57) (15 - 18)  SpO2: 98% (05-13-18 @ 04:57)    PHYSICAL EXAM:  GENERAL: NAD, well-developed  HEAD:  Atraumatic, Normocephalic  EYES: EOMI,  conjunctiva and sclera clear  NECK: Supple, No JVD  CHEST/LUNG: Clear to auscultation bilaterally; No wheeze  HEART: Regular rate and rhythm;  S1, S2 3/6 LALA  ABDOMEN: Soft, Nontender, Nondistended; Bowel sounds present  EXTREMITIES:  2+ Peripheral Pulses, No clubbing, cyanosis, grade 2-3 edema   PSYCH: AAOx2  NEUROLOGY: mild asterixis    LABS:              11.1                 133  | 14   | 201          11.21 >-----------< 67      ------------------------< 105                   32.8                 5.2  | 88   | 4.68                                         Ca 8.5   Mg 3.7   Ph x           TPro  6.4  /  Alb  3.4      TBili  4.5  /  DBili  3.2        AST  94  /  ALT  60            AlkPhos  130      INR: 2.72<H>;    PT: 31.9 SEC<H>;    PTT: 35.5 SEC                MICROBIOLOGY:        RADIOLOGY & ADDITIONAL TESTS:    Imaging Personally Reviewed:    [ ] Consultant(s) Notes Reviewed:  [ ] Care Discussed with Consultants/Other Providers:

## 2018-05-13 NOTE — PROGRESS NOTE ADULT - PROBLEM SELECTOR PLAN 3
On coumadin for a-fib
- Patient with low platelets of unclear etiology,   Blue top - 68   DW Heme- not likely HIT as pt not received heparin here.  Medications unlikely the cause of thrombocytopenia.   Less likely DIC as fibrinogen WNL   May need BM biopsy to r/o MDS or underlying BM disorder.
- Patient with low platelets of unclear etiology,   DW Heme- not likely HIT as pt not received heparin here.  Medications unlikely the cause of thrombocytopenia. Less likely DIC as fibrinogen WNL.
- Patient with low platelets of unclear etiology,   DW Heme- not likely HIT as pt not received heparin here.  Medications unlikely the cause of thrombocytopenia. Less likely DIC as fibrinogen WNL.
- mild transaminitis in the past  - New elevation in Bilirubin level (1.7)  - Repeat CMP improving  Will check fractionated Bili   Will check RUQ ultrasound
Appreciate Renal consult- JAKUB on CKD  likely hemodynamically mediated in the setting of decreased PO intake, N/V and low BP readings. - Normal renal U/S.  Suspect cardiorenal syndrome.  F/U TTE for LV Fx eval.  Monitor off diuresis for now. Send stool guiac to rule out GI bleed.
Appreciate Renal consult- JAKUB on CKD  likely hemodynamically mediated in the setting of decreased PO intake, N/V and low BP readings. - Normal renal U/S.  Suspect cardiorenal syndrome.  F/U TTE for LV Fx eval.  Monitor off diuresis for now. Send stool guiac to rule out GI bleed.
On coumadin for a-fib.
Volume status unclear.  Possibly hypervolemic hyponatremia.  Monitor serum sodium, currently stable at 132.
Volume status unclear.  Possibly hypervolemic hyponatremia.  Monitor serum sodium, currently stable at 134.
Volume status unclear.  Possibly hypervolemic hyponatremia.  Monitor serum sodium.
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 TMA 2/2 underlying autoimmune dx given acute renal failure.  pt also with macroytosis and elevated retic count  Coomb negative,B12 WNL , Iron studies cw AOCD
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 TMA 2/2 underlying autoimmune dx given acute renal failure.  pt also with macroytosis and elevated retic count  Coomb negative,B12 WNL , Iron studies cw AOCD  Awaiting TTE to evaluate for possible kwan-valvular leak as possible source.
- Patient with low platelets of unclear etiology,   DW Heme- not likely HIT as pt not received heparin here.  Medications unlikely the cause of thrombocytopenia. Less likely DIC as fibrinogen WNL.

## 2018-05-13 NOTE — PROGRESS NOTE ADULT - PROBLEM SELECTOR PLAN 9
hypertherapeutic on coumadin.
- Patient states she is on advair and albuterol, will therefore place on advair 250/50 and duonebs prn, need to clarify meds in AM
hypertherapeutic on coumadin.
hypertherapeutic on coumadin.

## 2018-05-13 NOTE — PROGRESS NOTE ADULT - PROBLEM SELECTOR PLAN 4
- Multifactorial causes for her elevated blood lactate (renal failure, decreased liver elimination, possibly medication use),  Await repeat lactate from this AM
- Patient with low platelets of unclear etiology,   Blue top - 68. DW Heme- not likely HIT as pt not received heparin here.  Medications unlikely the cause of thrombocytopenia. Less likely DIC as fibrinogen WNL. May need BM biopsy to r/o MDS or underlying BM disorder.
- Patient with low platelets of unclear etiology,   Blue top - 68. DW Heme- not likely HIT as pt not received heparin here.  Medications unlikely the cause of thrombocytopenia. Less likely DIC as fibrinogen WNL. May need BM biopsy to r/o MDS or underlying BM disorder.
with hyperbilirubinemia  - mild GB wall thickening but asymptomatic.  T bili - increase, both direct and indirect   Negative infective hepatitis work up.
with hyperbilirubinemia  AST/ALT /Alk phos  slowly trending down  T bili -increase, both direct and indirect   Hep B negative   Await Hep C   Await abdominal ultrasound  - Repeat CMP improving  Will check fractionated Bili   Will check RUQ ultrasound
with hyperbilirubinemia  - mild GB wall thickening but asymptomatic.  T bili - increase, both direct and indirect   Negative infective hepatitis work up.

## 2018-05-13 NOTE — PROGRESS NOTE ADULT - PROBLEM SELECTOR PROBLEM 10
Advanced care planning/counseling discussion
Need for prophylactic measure

## 2018-05-13 NOTE — PROGRESS NOTE ADULT - PROBLEM SELECTOR PROBLEM 9
Need for prophylactic measure
COPD (Chronic Obstructive Pulmonary Disease)
Need for prophylactic measure
Need for prophylactic measure

## 2018-05-13 NOTE — PROGRESS NOTE ADULT - PROBLEM SELECTOR PROBLEM 6
Atrial Fibrillation
Coronary artery disease involving native coronary artery of native heart without angina pectoris
Coronary artery disease involving native coronary artery of native heart without angina pectoris
Lactate blood increase
Coronary artery disease involving native coronary artery of native heart without angina pectoris

## 2018-05-13 NOTE — DISCHARGE NOTE FOR THE EXPIRED PATIENT - HOSPITAL COURSE
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 Renner who found patient to be hyperkalemic (unknown value). Patient states she has been having generalized weakness and decreased appetite for 1 week. She has also had episodes of nausea and vomiting this week and has had decreased oral intake as a result. Denies headache, cp, palpitations, sob, abdominal pain, cough, uri symptoms, urinary symptoms, diarrhea. She denied being started on any new medications. Said that she is compliant with her medications but is not sure which medications she takes.     Hyperkalemia resolved after treatment with IV calcium gluconate, insulin, D50, albuterol nebulizer x2, and Kayexalate. Pt was also found with acute on chronic renal failure for which nephrology was following. CKD likely in the setting of DM, HTN, CHF. JAKUB likely hemodynamically mediated in the setting of decreased PO intake, N/V and low BP readings. As per review on labs from [facility], pt. Scr ranges from 1.5-2 since 8/2017. Scr on admission was elevated at 4.3. Based on urine studies and clinical exam, patient was likely intravascularly depleted - however pt responded to fluid challenge with worsening of renal function and increased rales on exam. Pt had a normal renal US and was monitored off diuresis. Pt also with hemolytic anemia and thrombocytopenia, hematology was consulted. 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 TMA 2/2 underlying autoimmune dx given acute renal failure. Pt had a CT scan looking for the possibility of a malignancy which was negative. The possibility of a bone marrow aspiration and biopsy was proposed to further clarify the reason for her hemolytic anemia and thrombocytopenia. Pts daughter deferred any further investigation into this, and comfort/hospice care was discussed.  Concerns about worsening mental status related to renal failure was discussed with pt's daughter,  HD is not consistent with patient's wishes and pt/family aware of probable fatal outcome without HD. Pt was anticoagulated for Afib on coumadin, however this was held as not consistent with goals of care. Hospice referral was made. On 5/13 writer was called by RN to evaluate pt noted to be agonal breathing. On arrival, there is no longer visible chest rise, upon auscultation there is no audible heart or breath sounds, there is no palpable pulse, pupils unreactive to light. Time of death 21:12. Dr. Joshi and pt's daughter Alice Grove made aware. Pts daughter has chosen to defer an autopsy. Emotional support provided.

## 2018-05-13 NOTE — PROGRESS NOTE ADULT - ASSESSMENT
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 Hematology consulted for TTP rule out.
89 y/o F with multiple medical problems including DM, HTN, CHF, CAD was sent in by PCP for abnormal labs and was found to have JAKUB and hyperkalemia.
89 y/o F with multiple medical problems including DM, HTN, CHF, CAD was sent in by PCP for abnormal labs and was found to have JAKUB and hyperkalemia.
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
89 y/o F with multiple medical problems including DM, HTN, CHF, CAD was sent in by PCP for abnormal labs and was found to have JAKUB and hyperkalemia.
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 Hematology consulted for TTP rule out.
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 Hematology consulted to r/o TTP.
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

## 2018-05-13 NOTE — PROGRESS NOTE ADULT - PROBLEM SELECTOR PLAN 5
- On  Coumadin for Afib  INR supratherapeutic, no active bleeding  S/p vitamin K 2 mg yesterday   continue to monitor   - Currently rate controlled
- Patient with low platelets of unclear etiology,   Heme consulted
- was on Coumadin for Afib - held for high INR  now off coumadin as it's not consistent with patient's goals of care.
- was on Coumadin for Afib - held for high INR  now off coumadin as it's not consistent with patient's goals of care.
Low clinical suspicion for acute cholecystitis but RUQ sono shows some mild GB thickening.  Leukocytosis improving.  Will continue to monitor the situation.
Low clinical suspicion for acute cholecystitis but RUQ sono shows some mild GB thickening.  Leukocytosis stable. Will continue to monitor the situation.
with hyperbilirubinemia  - mild GB wall thickening but asymptomatic - may elect to treat if no other possible source of infection.  T bili - increase, both direct and indirect   Awaiting acute hepatitis panel.
with hyperbilirubinemia  - mild GB wall thickening but asymptomatic - may elect to treat if no other possible source of infection.  T bili - increase, both direct and indirect   Hep B negative   Await Hep C
- was on Coumadin for Afib - held for hypertherapeutic INR  Will discontinue coumadin as it's not consistent with patient's goals of care.

## 2018-05-13 NOTE — PROGRESS NOTE ADULT - PROBLEM SELECTOR PROBLEM 5
Atrial Fibrillation
Leukocytosis, unspecified type
Leukocytosis, unspecified type
Thrombocytopenia
Transaminitis
Transaminitis
Atrial Fibrillation

## 2018-05-13 NOTE — PROGRESS NOTE ADULT - PROBLEM SELECTOR PLAN 2
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  Coomb negative,B12 WNL , Iron studies cw AOCD  CT of C/A/P -unremarkable, no obvious source of malignancy or infection noted.

## 2018-05-13 NOTE — PROGRESS NOTE ADULT - PROBLEM SELECTOR PLAN 8
- Patient states she is on advair and albuterol, will therefore place on advair 250/50 and duonebs prn, need to clarify meds in AM
- Placed on insulin sliding scale  - Fingersticks before meals and at bedtime  - Hemoglobin A1C in AM  - Verify meds in AM

## 2018-05-13 NOTE — PROGRESS NOTE ADULT - PROBLEM SELECTOR PLAN 10
Per Dr Bridges's conversation with pt's daughter.  Pt is DNR/DNI, no feeding tube, no HD, treatment with primary goal of comfort.  Will contact hospice for disposition plan.  Will review med list and discontinue all non-essential medications and regiments.

## 2018-05-13 NOTE — PROGRESS NOTE ADULT - PROBLEM SELECTOR PROBLEM 3
Elevated INR
Acute renal failure superimposed on stage 3 chronic kidney disease, unspecified acute renal failure type
Acute renal failure superimposed on stage 3 chronic kidney disease, unspecified acute renal failure type
Elevated INR
Hemolytic anemia
Hemolytic anemia
Hyponatremia
Thrombocytopenia
Transaminitis
Thrombocytopenia

## 2018-05-13 NOTE — PROGRESS NOTE ADULT - PROBLEM SELECTOR PROBLEM 1
Anemia
Acute renal failure superimposed on stage 3 chronic kidney disease, unspecified acute renal failure type
Anemia
Anemia
Hemolytic anemia
Hemolytic anemia
Hyperkalemia
JAKUB (acute kidney injury)
Leukocytosis, unspecified type
Leukocytosis, unspecified type
Uremia

## 2018-05-13 NOTE — PROGRESS NOTE ADULT - PROBLEM SELECTOR PLAN 1
pt with advanced renal failure/uremia, family/patient declined dialysis  poor prognosis, awaiting hospice consult  mild IVF hydration NaHCO3 drip 50ml/h x1 day for metabolic acidosis due to advanced renal insufficiency pt with advanced renal failure/uremia, family/patient declined dialysis  poor prognosis, awaiting hospice consult  gentle IVF hydration NaHCO3 drip 50ml/h x1 day for metabolic acidosis due to advanced renal insufficiency pt with advanced renal failure/uremia, family/patient declined dialysis  poor prognosis, awaiting hospice consult  Add NaHCO3 650 mg tid for metabolic acidosis due to advanced renal insufficiency

## 2018-05-13 NOTE — DISCHARGE NOTE FOR THE EXPIRED PATIENT - SECONDARY DIAGNOSIS.
Acute on chronic renal failure CHF (congestive heart failure) DM (diabetes mellitus) HTN (Hypertension)

## 2018-05-14 NOTE — PROVIDER CONTACT NOTE (OTHER) - SITUATION
Molst form (DNR/DNI) in chart. Pt found to be in agonal breathing, Vfib on tele monitor. Tele PA  Maribell White at bedside. Pt declared  at 21:12.

## 2018-05-17 LAB — PAROXYSMAL NOCTURNAL PNL BLD: SIGNIFICANT CHANGE UP

## 2018-06-08 ENCOUNTER — APPOINTMENT (OUTPATIENT)
Dept: CARDIOLOGY | Facility: CLINIC | Age: 83
End: 2018-06-08

## 2019-09-23 NOTE — H&P ADULT. - PEDAL EDEMA SEVERITY
2+ Azithromycin Counseling:  I discussed with the patient the risks of azithromycin including but not limited to GI upset, allergic reaction, drug rash, diarrhea, and yeast infections.

## 2019-10-29 NOTE — PATIENT PROFILE ADULT. - NS PRO PT RIGHT SUPPORT PERSON
Plan/Recommendations:   1. Initiate speech therapy twice per week, 30 minute individual sessions, with a home program to address long-term and short-term goals described below.   2. Continue peer stimulation via family/friends.  3. Continued home stimulation with parent education.   4. Continued follow-up with referring physician and/or PCP as needed for medical care/management.  5. Contact the provider at [phone] with any further questions or concerns.         Declines

## 2020-07-25 NOTE — PATIENT PROFILE ADULT. - AS SC BRADEN SENSORY
[Follow-Up Visit] : a follow-up [[employer] ] : provided by [employer]   [FreeTextEntry2] : Manish [FreeTextEntry1] : [id number] [TWNoteComboBox1] : [place] (3) slightly limited

## 2020-09-15 NOTE — DISCHARGE NOTE ADULT - PRINCIPAL DIAGNOSIS
AFTER YOUR TRANSESOPHAGEAL ECHOCARDIOGRAM    Be sure someone else drives you home; do not drive today. You may feel drowsy for several hours. Do not eat or drink for at least two hours after your procedure. Your throat will be numb and there is a risk you might have difficulty swallowing for a while. Be careful when you do eat or drink for the first time especially with hot fluids since you could easily burn your throat. Call your doctor if:    · You are bleeding from your throat or mouth. · You have trouble breathing all of a sudden. · You have chest pain or any pain that spreads to your neck, jaw, or arms. · You have questions or concerns. · You have a fever greater than 101°F.    Doctor: Jose Hester    Special Instructions:    No driving for 24 hours. Discharge Medications:   {Medication reconciliation information is now added to the patient's AVS automatically when it is printed. There is no need to use this SmartLink in discharge instructions.   Highlight this text and delete it to clear this message} Weakness Acute on chronic diastolic congestive heart failure

## 2020-12-28 NOTE — H&P ADULT. - HISTORY OF PRESENT ILLNESS
89 yr old female with hx of COPD, PPM, afib, s/p bovine valvular replacement, CVA during surgery presents with cough and dyspnea for a week. Pt walks around with a cane. Daughter noticed pt had intermittent cough with white mucus. She tried to bring her to the hospital, but declined by pt. Pt has been using inhaler at home without relief.  However, pt felt SOB this morning and agreed to come. Pt denies fever/chills/CP. Chronic leg edema, unchanged.   While in ER, pt hypertensive with sBP 180s, afebrile. s/p solumedrol x 1 in ER. AAOx3, appears comfortable. Reports feeling well. + diffuse rales in lungs. Daughter at bedside. home 89 year old woman with CHF and chronic leg edema (b/l to mid-shin), CAD (CABG), AFib (PPM), PVD, MVR/AVR (bioprosthetic), HTN, HLD, COPD, h/o CVA, recent hospitalizations (12/2016 [facility] for PNA and COPD, 2/2017 [facility] ?CHF exac?) and rehab stays, usually walks with cane but now walker, presents from home with shortness of breath.  Felt generally well until Saturday 4/8/17 developed increased bilateral leg edema to upper thighs.  Following day had dyspnea on exertion and shortness of breath, associated with subjective fever, generalized weakness, cough with yellow phlegm.  On lasix 40 daily, received extra lasix 20 by daughter.  Came to ER because still short of breath.  No chest pain, chills, recent travel, surgery, PE risk factors.  Daughter notes patient does not follow low sodium diet or restrict water intake.  Notes daughter has itchy throat and many contacts at [facility] on Sunday possible sick contacts.

## 2021-04-22 NOTE — PHYSICAL THERAPY INITIAL EVALUATION ADULT - ASSISTIVE DEVICE FOR TRANSFER: SIT/STAND, REHAB EVAL
04/23/21                            Antonia Guzman  [address]  [facility] [address]    To Whom It May Concern:    This is to certify Antoniaashwini Navarroer was evaluated with Rashi Mathis DO on 4/06/21.     RESTRICTIONS: Light duty from 4/20/21- 4/23/21 and 4/26/21-4/30/21.             Rashi Mathis DO  [facility]  [address]  [facility] [address]  Phone: [phone]  Fax: [phone]    
rolling walker

## 2021-12-16 NOTE — PATIENT PROFILE ADULT. - NS PRO OT REFERRAL QUES 1 YN
Presents with left sided headache onset 1330 with nausea and photosensitivity. Took Tizanidine and Hydrocodone without relief and also tried ice. Hx migraines and states this feels similar.    no

## 2022-04-11 NOTE — PATIENT PROFILE ADULT. - NS PRO TALK SOMEONE YN
PHYSICAL THERAPY NOTE          Patient Name: Yamilex Esparza  Today's Date: 4/11/2022     Received repeat order for PT services  Patient already on PT caseload  Continue PT POC as established on 4/7/2022      Janett Farias, PT,DPT no

## 2022-07-06 NOTE — DISCHARGE NOTE ADULT - EFFECT OF WARFARIN/COUMADIN. NEVER TAKE ASPIRIN WITHOUT SPEAKING WITH YOUR HEALTH CARE PROVIDER.
1) We will contact pulmonary in regards to repeating sleep study    2) Follow up in September with [facility], ECHO, walk and labs   Statement Selected

## 2022-08-18 NOTE — H&P ADULT. - PROBLEM SELECTOR PLAN 6
Workup: Hx of Hep C and daily ETOH.  4/26/22- CT lung screen d/t smoking hx.- noting liver finding  5/27- MRI abd- LI-RADS 5 lesion in the right hepatic dome representing hepatocellular carcinoma.   7/14- AFP- 14.4  7/20- CT AP w/ contrast- known liver lesion, nonspecific portacaval and retroperitoneal lymph nodes 7/20- Bone Scan- no mets  7/28- [facility]- ref to surgery  7/29- Gi consult.       Plan: watching patient thru CE to ensure resection goes well and happens versus local regional therapy (LRT would allow for transplant, as resection would be for curative intent).     No action needed at this time   Routine blood pressure monitoring.   Continue with current medications.   DASH diet

## 2023-02-01 NOTE — H&P ADULT - PSH
Patient/Caregiver provided printed discharge information.
Aortic Valvar Stenosis  S/P AVR bovine 2005  Model 2700  Serial [id number]  CABG (Coronary Artery Bypass Graft)    Cataract, age-related  b/l surgeries  Hernia, inguinal, left  s/p repair??  History of cardioversion  for rapid A fib   Mitral Valvular Disorder  S/P MVR bovine   Pacemaker  Guidant 2005  PG Device: insignia I ultra IS-1 Comp, [id number], [id number]  S/P   x 1

## 2023-08-30 NOTE — PROGRESS NOTE ADULT - PROBLEM SELECTOR PLAN 1
Quality 111:Pneumonia Vaccination Status For Older Adults: Patient received any pneumococcal conjugate or polysaccharide vaccine on or after their 60th birthday and before the end of the measurement period Detail Level: Detailed Quality 130: Documentation Of Current Medications In The Medical Record: Current Medications Documented Quality 47: Advance Care Plan: Advance Care Planning discussed and documented in the medical record; patient did not wish or was not able to name a surrogate decision maker or provide an advance care plan. Quality 110: Preventive Care And Screening: Influenza Immunization: Influenza Immunization Administered during Influenza season Quality 226: Preventive Care And Screening: Tobacco Use: Screening And Cessation Intervention: Patient screened for tobacco use and is an ex/non-smoker pt with advanced renal failure/uremia, family/patient declined dialysis  poor prognosis, awaiting hospice consult

## 2024-04-24 NOTE — PATIENT PROFILE ADULT. - FUNCTIONAL SCREEN CURRENT LEVEL: SWALLOWING (IF SCORE 2 OR MORE FOR ANY ITEM, CONSULT REHAB SERVICES), MLM)
Stacey Heath  Age: 52 y.o.  MRN: [id number]  Date: 4/23/2024  Location of service: phone call and care coordination    Program Details  Medicaid Community Clinical Case Management  Status: Enrolled  Effective Dates: 10/17/2023 - present  Responsible Staff: PORFIRIO Valderrama      Goals Reviewed:  Problem: Risk of Uncoordinated Care       Goal: Care will be Coordinated and Supported by a Multidisciplinary Team of Providers       Priority: High          Summary:  This writer called and scheduled patient's pulmonary function test, 6 minute walk test, O2 titration test, and chest CT.   This writer scheduled patient's gastro appointment online for May 30th.   This writer called patient to inform her that her appointments were scheduled, this writer will inform her of the times of her appointments at a later time.  This writer called patient to inform her of the dates and times of the appointments this writer scheduled. This writer also scheduled an appointment for patient and this writer to meet for an appointment next week.    Appointment start time: 0950  Appointment completion time: 1038  Total time spent with patient (in minutes): 48      Roberta Gallego RN     (0) swallows foods/liquids without difficulty

## 2024-10-23 NOTE — PATIENT PROFILE ADULT. - BLOOD TRANSFUSION, PREVIOUS, PROFILE
Clayton Talbot  Obstetrics and Gynecology  [address] [place] [address]  Phone: [phone]  Fax: [phone]  Follow Up Time:    no Clayton Talbot  Obstetrics and Gynecology  [address] [place] [address]  Phone: [phone]  Fax: [phone]  Follow Up Time: 2 weeks
